# Patient Record
Sex: FEMALE | Race: BLACK OR AFRICAN AMERICAN | Employment: FULL TIME | URBAN - METROPOLITAN AREA
[De-identification: names, ages, dates, MRNs, and addresses within clinical notes are randomized per-mention and may not be internally consistent; named-entity substitution may affect disease eponyms.]

---

## 2020-07-01 LAB
ANTIBODY SCREEN, EXTERNAL: NEGATIVE
CHLAMYDIA, EXTERNAL: NEGATIVE
HBSAG, EXTERNAL: NEGATIVE
HCT, EXTERNAL: 38.6
HGB, EXTERNAL: 13.2
HIV, EXTERNAL: NEGATIVE
N. GONORRHEA, EXTERNAL: NEGATIVE
RPR, EXTERNAL: NORMAL
RUBELLA, EXTERNAL: NORMAL
TYPE, ABO & RH, EXTERNAL: NORMAL

## 2020-09-25 ENCOUNTER — HOSPITAL ENCOUNTER (OUTPATIENT)
Dept: PERINATAL CARE | Age: 25
Discharge: HOME | End: 2020-09-25
Attending: OBSTETRICS & GYNECOLOGY
Payer: COMMERCIAL

## 2020-09-25 PROCEDURE — 76811 OB US DETAILED SNGL FETUS: CPT | Performed by: OBSTETRICS & GYNECOLOGY

## 2020-10-27 LAB — GTT, 1 HR, GLUCOLA, EXTERNAL: 126

## 2021-01-11 ENCOUNTER — HOSPITAL ENCOUNTER (OUTPATIENT)
Dept: PERINATAL CARE | Age: 26
Discharge: HOME | End: 2021-01-11
Attending: OBSTETRICS & GYNECOLOGY
Payer: COMMERCIAL

## 2021-01-11 PROCEDURE — 76816 OB US FOLLOW-UP PER FETUS: CPT | Performed by: OBSTETRICS & GYNECOLOGY

## 2021-01-13 LAB — GRBS, EXTERNAL: POSITIVE

## 2021-01-19 ENCOUNTER — HOSPITAL ENCOUNTER (OUTPATIENT)
Dept: PERINATAL CARE | Age: 26
Discharge: HOME | End: 2021-01-19
Attending: OBSTETRICS & GYNECOLOGY

## 2021-01-27 ENCOUNTER — HOSPITAL ENCOUNTER (OUTPATIENT)
Dept: PERINATAL CARE | Age: 26
Discharge: HOME | End: 2021-01-27
Attending: OBSTETRICS & GYNECOLOGY
Payer: COMMERCIAL

## 2021-01-27 ENCOUNTER — HOSPITAL ENCOUNTER (OUTPATIENT)
Dept: LAB | Age: 26
Discharge: HOME | End: 2021-01-27
Payer: COMMERCIAL

## 2021-01-27 ENCOUNTER — TRANSCRIBE ORDER (OUTPATIENT)
Dept: REGISTRATION | Age: 26
End: 2021-01-27

## 2021-01-27 DIAGNOSIS — Z01.812 PRE-PROCEDURAL LABORATORY EXAMINATIONS: Primary | ICD-10-CM

## 2021-01-27 DIAGNOSIS — Z01.812 PRE-PROCEDURAL LABORATORY EXAMINATIONS: ICD-10-CM

## 2021-01-27 PROCEDURE — U0003 INFECTIOUS AGENT DETECTION BY NUCLEIC ACID (DNA OR RNA); SEVERE ACUTE RESPIRATORY SYNDROME CORONAVIRUS 2 (SARS-COV-2) (CORONAVIRUS DISEASE [COVID-19]), AMPLIFIED PROBE TECHNIQUE, MAKING USE OF HIGH THROUGHPUT TECHNOLOGIES AS DESCRIBED BY CMS-2020-01-R: HCPCS

## 2021-01-27 PROCEDURE — 76820 UMBILICAL ARTERY ECHO: CPT | Performed by: OBSTETRICS & GYNECOLOGY

## 2021-01-27 PROCEDURE — 76819 FETAL BIOPHYS PROFIL W/O NST: CPT | Performed by: OBSTETRICS & GYNECOLOGY

## 2021-01-28 LAB — SARS-COV-2, COV2NT: NOT DETECTED

## 2021-02-01 ENCOUNTER — HOSPITAL ENCOUNTER (INPATIENT)
Age: 26
LOS: 5 days | Discharge: HOME | DRG: 540 | End: 2021-02-06
Attending: OBSTETRICS & GYNECOLOGY | Admitting: OBSTETRICS & GYNECOLOGY
Payer: COMMERCIAL

## 2021-02-01 DIAGNOSIS — Z98.890 STATUS POST SURGERY: Primary | ICD-10-CM

## 2021-02-01 PROBLEM — Z34.90 PREGNANCY: Status: ACTIVE | Noted: 2021-02-01

## 2021-02-01 LAB
ERYTHROCYTE [DISTWIDTH] IN BLOOD BY AUTOMATED COUNT: 15.1 % (ref 11.5–14.5)
HCT VFR BLD AUTO: 36.2 % (ref 35–47)
HGB BLD-MCNC: 11.9 G/DL (ref 11.5–16)
MCH RBC QN AUTO: 27.4 PG (ref 26–34)
MCHC RBC AUTO-ENTMCNC: 32.9 G/DL (ref 30–36.5)
MCV RBC AUTO: 83.4 FL (ref 80–99)
NRBC # BLD: 0 K/UL (ref 0–0.01)
NRBC BLD-RTO: 0 PER 100 WBC
PLATELET # BLD AUTO: 251 K/UL (ref 150–400)
PMV BLD AUTO: 11.5 FL (ref 8.9–12.9)
RBC # BLD AUTO: 4.34 M/UL (ref 3.8–5.2)
WBC # BLD AUTO: 10.3 K/UL (ref 3.6–11)

## 2021-02-01 PROCEDURE — 75410000002 HC LABOR FEE PER 1 HR: Performed by: OBSTETRICS & GYNECOLOGY

## 2021-02-01 PROCEDURE — 74011250636 HC RX REV CODE- 250/636: Performed by: OBSTETRICS & GYNECOLOGY

## 2021-02-01 PROCEDURE — 36415 COLL VENOUS BLD VENIPUNCTURE: CPT

## 2021-02-01 PROCEDURE — 75410000003 HC RECOV DEL/VAG/CSECN EA 0.5 HR: Performed by: OBSTETRICS & GYNECOLOGY

## 2021-02-01 PROCEDURE — 76060000078 HC EPIDURAL ANESTHESIA: Performed by: ANESTHESIOLOGY

## 2021-02-01 PROCEDURE — 59200 INSERT CERVICAL DILATOR: CPT | Performed by: OBSTETRICS & GYNECOLOGY

## 2021-02-01 PROCEDURE — 65270000029 HC RM PRIVATE

## 2021-02-01 PROCEDURE — 85027 COMPLETE CBC AUTOMATED: CPT

## 2021-02-01 PROCEDURE — 77010026065 HC OXYGEN MINIMUM MEDICAL AIR: Performed by: OBSTETRICS & GYNECOLOGY

## 2021-02-01 RX ORDER — NALBUPHINE HYDROCHLORIDE 10 MG/ML
10 INJECTION, SOLUTION INTRAMUSCULAR; INTRAVENOUS; SUBCUTANEOUS
Status: DISCONTINUED | OUTPATIENT
Start: 2021-02-01 | End: 2021-02-06 | Stop reason: HOSPADM

## 2021-02-01 RX ORDER — CALCIUM CARBONATE 200(500)MG
400 TABLET,CHEWABLE ORAL
Status: DISCONTINUED | OUTPATIENT
Start: 2021-02-01 | End: 2021-02-03 | Stop reason: HOSPADM

## 2021-02-01 RX ORDER — SODIUM CHLORIDE, SODIUM LACTATE, POTASSIUM CHLORIDE, CALCIUM CHLORIDE 600; 310; 30; 20 MG/100ML; MG/100ML; MG/100ML; MG/100ML
125 INJECTION, SOLUTION INTRAVENOUS CONTINUOUS
Status: DISCONTINUED | OUTPATIENT
Start: 2021-02-02 | End: 2021-02-04

## 2021-02-01 RX ORDER — OXYTOCIN/RINGER'S LACTATE 30/500 ML
0-20 PLASTIC BAG, INJECTION (ML) INTRAVENOUS
Status: DISCONTINUED | OUTPATIENT
Start: 2021-02-02 | End: 2021-02-06 | Stop reason: HOSPADM

## 2021-02-01 RX ORDER — SODIUM CHLORIDE 0.9 % (FLUSH) 0.9 %
5-40 SYRINGE (ML) INJECTION EVERY 8 HOURS
Status: DISCONTINUED | OUTPATIENT
Start: 2021-02-01 | End: 2021-02-04

## 2021-02-01 RX ORDER — ONDANSETRON 2 MG/ML
4 INJECTION INTRAMUSCULAR; INTRAVENOUS
Status: DISCONTINUED | OUTPATIENT
Start: 2021-02-01 | End: 2021-02-03 | Stop reason: HOSPADM

## 2021-02-01 RX ORDER — ZOLPIDEM TARTRATE 5 MG/1
5 TABLET ORAL
Status: DISCONTINUED | OUTPATIENT
Start: 2021-02-01 | End: 2021-02-06 | Stop reason: HOSPADM

## 2021-02-01 RX ORDER — OXYTOCIN/RINGER'S LACTATE 30/500 ML
10 PLASTIC BAG, INJECTION (ML) INTRAVENOUS AS NEEDED
Status: DISCONTINUED | OUTPATIENT
Start: 2021-02-01 | End: 2021-02-06 | Stop reason: HOSPADM

## 2021-02-01 RX ORDER — MINERAL OIL
120 OIL (ML) ORAL ONCE
Status: ACTIVE | OUTPATIENT
Start: 2021-02-01 | End: 2021-02-02

## 2021-02-01 RX ORDER — OXYTOCIN/RINGER'S LACTATE 30/500 ML
87.3 PLASTIC BAG, INJECTION (ML) INTRAVENOUS AS NEEDED
Status: DISCONTINUED | OUTPATIENT
Start: 2021-02-01 | End: 2021-02-06 | Stop reason: HOSPADM

## 2021-02-01 RX ORDER — NALOXONE HYDROCHLORIDE 0.4 MG/ML
0.4 INJECTION, SOLUTION INTRAMUSCULAR; INTRAVENOUS; SUBCUTANEOUS AS NEEDED
Status: DISCONTINUED | OUTPATIENT
Start: 2021-02-01 | End: 2021-02-03 | Stop reason: HOSPADM

## 2021-02-01 RX ORDER — SODIUM CHLORIDE, SODIUM LACTATE, POTASSIUM CHLORIDE, CALCIUM CHLORIDE 600; 310; 30; 20 MG/100ML; MG/100ML; MG/100ML; MG/100ML
125 INJECTION, SOLUTION INTRAVENOUS CONTINUOUS
Status: DISCONTINUED | OUTPATIENT
Start: 2021-02-01 | End: 2021-02-01

## 2021-02-01 RX ORDER — ACETAMINOPHEN 325 MG/1
650 TABLET ORAL
Status: DISCONTINUED | OUTPATIENT
Start: 2021-02-01 | End: 2021-02-03 | Stop reason: HOSPADM

## 2021-02-01 RX ORDER — SODIUM CHLORIDE 0.9 % (FLUSH) 0.9 %
5-40 SYRINGE (ML) INJECTION AS NEEDED
Status: DISCONTINUED | OUTPATIENT
Start: 2021-02-01 | End: 2021-02-04

## 2021-02-01 RX ADMIN — NALBUPHINE HYDROCHLORIDE 10 MG: 10 INJECTION, SOLUTION INTRAMUSCULAR; INTRAVENOUS; SUBCUTANEOUS at 21:38

## 2021-02-01 NOTE — PROGRESS NOTES
Plan for induction for IUGR    16:25- MD Juan Miguel at bedside to place cook catheter. MD successfully places catheter with use of speculum. 80cc instilled in both uterine and vaginal balloons. Pt tolerated well.     19:15- Bedside and Verbal shift change report given to RADHA Lerma RN (oncoming nurse) by John Paul Daley. Chet Boyd (offgoing nurse). Report included the following information SBAR, Procedure Summary, Intake/Output, MAR, Accordion and Recent Results. RN has nubain per pt request for pain medicine. RNs explain need for pt to remain on EFM for 2 hours if given IV pain medicine. Pt declines as she feels as though the bands for the monitor are too uncomfortable and would like to be able to have EFM removed. Nubain held. RNs remains at bedside adjusting EFM to obtain consistent FHR. Difficulty monitoring FHR d/t pt habitus. May need MD to U/S for FHR.

## 2021-02-01 NOTE — H&P
History & Physical    Name: Nickie Ruggiero MRN: [id number]  SSN: [id number]    YOB: 1995  Age: 23 y.o. Sex: female      Subjective:     Estimated Date of Delivery: 21  OB History        1    Para        Term                AB        Living           SAB        TAB        Ectopic        Molar        Multiple        Live Births                    Ms. Dawson Uribe is admitted with pregnancy at 39w1d for induction of labor due to poor fetal growth. Prenatal course was complicated by fetal growth restriction and mprbid obesity. Please see prenatal records for details. She is not anemic. Past Medical History:   Diagnosis Date    Abnormal Papanicolaou smear of cervix     Asthma     Psychiatric problem     depression     No past surgical history on file. Social History     Occupational History    Not on file   Tobacco Use    Smoking status: Not on file   Substance and Sexual Activity    Alcohol use: Not on file    Drug use: Not on file    Sexual activity: Not on file     No family history on file. No Known Allergies  Prior to Admission medications    Medication Sig Start Date End Date Taking? Authorizing Provider   prenatal 71/SGJB fum/folic/dha (PRENATAL-1 PO) Take  by mouth. Yes Provider, Historical        Review of Systems: A comprehensive review of systems was negative except for that written in the History of Present Illness. Objective:     Vitals:  Vitals:    21 1728 21 1733 21 1738 21 1743   BP:       Pulse:       Resp:       Temp:       SpO2: 100% 100% 100% 100%   Weight:       Height:            Physical Exam:  Patient without distress.   Abdomen: soft, nontender  Fundus: soft and non tender  Perineum: blood absent, amniotic fluid absent  Cervical Exam: Closed/Thick/High  Lower Extremities:  - Edema 1+  Membranes:  Intact  Fetal Heart Rate: Reactive     Estimated Fetal Weight: 7-8 percentile    Prenatal Labs:   Lab Results   Component Value Date/Time    Rubella, External Immune 07/01/2020    GrBStrep, External Positive 01/13/2021    HBsAg, External Negative 07/01/2020    HIV, External Negative 07/01/2020    RPR, External Non-Reactive 07/01/2020    Gonorrhea, External Negative 07/01/2020    Chlamydia, External Negative 07/01/2020        Impression/Plan:     Plan: Admit for induction of labor. Group B Strep positive, will treat prophylactically with penicillin. Cook catheter placed under direct vision with speculum 80/80cc. SGA.     Signed By:  Nino Ennis MD     February 1, 2021

## 2021-02-02 ENCOUNTER — ANESTHESIA EVENT (OUTPATIENT)
Dept: LABOR AND DELIVERY | Age: 26
DRG: 540 | End: 2021-02-02
Payer: COMMERCIAL

## 2021-02-02 ENCOUNTER — ANESTHESIA (OUTPATIENT)
Dept: LABOR AND DELIVERY | Age: 26
DRG: 540 | End: 2021-02-02
Payer: COMMERCIAL

## 2021-02-02 PROBLEM — Z3A.39 39 WEEKS GESTATION OF PREGNANCY: Status: ACTIVE | Noted: 2021-02-02

## 2021-02-02 PROBLEM — O36.5930 INTRAUTERINE GROWTH RESTRICTION, ANTEPARTUM, THIRD TRIMESTER, NOT APPLICABLE OR UNSPECIFIED FETUS: Status: ACTIVE | Noted: 2021-02-02

## 2021-02-02 PROCEDURE — 74011250636 HC RX REV CODE- 250/636: Performed by: OBSTETRICS & GYNECOLOGY

## 2021-02-02 PROCEDURE — 2709999900 HC NON-CHARGEABLE SUPPLY

## 2021-02-02 PROCEDURE — 3E033VJ INTRODUCTION OF OTHER HORMONE INTO PERIPHERAL VEIN, PERCUTANEOUS APPROACH: ICD-10-PCS | Performed by: OBSTETRICS & GYNECOLOGY

## 2021-02-02 PROCEDURE — 77030014125 HC TY EPDRL BBMI -B: Performed by: ANESTHESIOLOGY

## 2021-02-02 PROCEDURE — 65270000029 HC RM PRIVATE

## 2021-02-02 PROCEDURE — 74011000250 HC RX REV CODE- 250: Performed by: ANESTHESIOLOGY

## 2021-02-02 PROCEDURE — 74011000258 HC RX REV CODE- 258: Performed by: OBSTETRICS & GYNECOLOGY

## 2021-02-02 PROCEDURE — 74011000250 HC RX REV CODE- 250: Performed by: NURSE ANESTHETIST, CERTIFIED REGISTERED

## 2021-02-02 PROCEDURE — 74011000250 HC RX REV CODE- 250

## 2021-02-02 PROCEDURE — 75410000002 HC LABOR FEE PER 1 HR: Performed by: OBSTETRICS & GYNECOLOGY

## 2021-02-02 PROCEDURE — 00HU33Z INSERTION OF INFUSION DEVICE INTO SPINAL CANAL, PERCUTANEOUS APPROACH: ICD-10-PCS | Performed by: ANESTHESIOLOGY

## 2021-02-02 PROCEDURE — 77030005513 HC CATH URETH FOL11 MDII -B

## 2021-02-02 RX ORDER — EPHEDRINE SULFATE/0.9% NACL/PF 50 MG/5 ML
10 SYRINGE (ML) INTRAVENOUS ONCE
Status: COMPLETED | OUTPATIENT
Start: 2021-02-02 | End: 2021-02-02

## 2021-02-02 RX ORDER — FENTANYL/BUPIVACAINE/NS/PF 2-1250MCG
12 PREFILLED PUMP RESERVOIR EPIDURAL CONTINUOUS
Status: DISCONTINUED | OUTPATIENT
Start: 2021-02-02 | End: 2021-02-03 | Stop reason: HOSPADM

## 2021-02-02 RX ORDER — EPHEDRINE SULFATE/0.9% NACL/PF 50 MG/5 ML
SYRINGE (ML) INTRAVENOUS
Status: DISCONTINUED
Start: 2021-02-02 | End: 2021-02-02 | Stop reason: WASHOUT

## 2021-02-02 RX ORDER — EPHEDRINE SULFATE/0.9% NACL/PF 50 MG/5 ML
20 SYRINGE (ML) INTRAVENOUS ONCE
Status: COMPLETED | OUTPATIENT
Start: 2021-02-02 | End: 2021-02-02

## 2021-02-02 RX ORDER — EPHEDRINE SULFATE/0.9% NACL/PF 50 MG/5 ML
10 SYRINGE (ML) INTRAVENOUS
Status: COMPLETED | OUTPATIENT
Start: 2021-02-02 | End: 2021-02-02

## 2021-02-02 RX ORDER — LIDOCAINE HYDROCHLORIDE AND EPINEPHRINE 20; 5 MG/ML; UG/ML
INJECTION, SOLUTION EPIDURAL; INFILTRATION; INTRACAUDAL; PERINEURAL AS NEEDED
Status: DISCONTINUED | OUTPATIENT
Start: 2021-02-02 | End: 2021-02-03 | Stop reason: HOSPADM

## 2021-02-02 RX ADMIN — SODIUM CHLORIDE, POTASSIUM CHLORIDE, SODIUM LACTATE AND CALCIUM CHLORIDE 999 ML/HR: 600; 310; 30; 20 INJECTION, SOLUTION INTRAVENOUS at 07:52

## 2021-02-02 RX ADMIN — Medication 12 ML/HR: at 08:58

## 2021-02-02 RX ADMIN — SODIUM CHLORIDE, POTASSIUM CHLORIDE, SODIUM LACTATE AND CALCIUM CHLORIDE 125 ML/HR: 600; 310; 30; 20 INJECTION, SOLUTION INTRAVENOUS at 02:25

## 2021-02-02 RX ADMIN — SODIUM CHLORIDE 2.5 MILLION UNITS: 9 INJECTION, SOLUTION INTRAVENOUS at 11:20

## 2021-02-02 RX ADMIN — NALBUPHINE HYDROCHLORIDE 10 MG: 10 INJECTION, SOLUTION INTRAMUSCULAR; INTRAVENOUS; SUBCUTANEOUS at 02:37

## 2021-02-02 RX ADMIN — ONDANSETRON 4 MG: 2 INJECTION INTRAMUSCULAR; INTRAVENOUS at 09:04

## 2021-02-02 RX ADMIN — SODIUM CHLORIDE 2.5 MILLION UNITS: 9 INJECTION, SOLUTION INTRAVENOUS at 06:38

## 2021-02-02 RX ADMIN — OXYTOCIN 2 MILLI-UNITS/MIN: 10 INJECTION, SOLUTION INTRAMUSCULAR; INTRAVENOUS at 04:57

## 2021-02-02 RX ADMIN — SODIUM CHLORIDE, POTASSIUM CHLORIDE, SODIUM LACTATE AND CALCIUM CHLORIDE 125 ML/HR: 600; 310; 30; 20 INJECTION, SOLUTION INTRAVENOUS at 08:58

## 2021-02-02 RX ADMIN — SODIUM CHLORIDE 2.5 MILLION UNITS: 9 INJECTION, SOLUTION INTRAVENOUS at 20:22

## 2021-02-02 RX ADMIN — SODIUM CHLORIDE 5 MILLION UNITS: 900 INJECTION INTRAVENOUS at 02:25

## 2021-02-02 RX ADMIN — Medication 10 ML/HR: at 17:31

## 2021-02-02 RX ADMIN — SODIUM CHLORIDE 12.5 MG: 9 INJECTION, SOLUTION INTRAVENOUS at 03:12

## 2021-02-02 RX ADMIN — LIDOCAINE HYDROCHLORIDE,EPINEPHRINE BITARTRATE 8 ML: 20; .005 INJECTION, SOLUTION EPIDURAL; INFILTRATION; INTRACAUDAL; PERINEURAL at 19:23

## 2021-02-02 RX ADMIN — SODIUM CHLORIDE, POTASSIUM CHLORIDE, SODIUM LACTATE AND CALCIUM CHLORIDE 125 ML/HR: 600; 310; 30; 20 INJECTION, SOLUTION INTRAVENOUS at 17:55

## 2021-02-02 RX ADMIN — LIDOCAINE HYDROCHLORIDE,EPINEPHRINE BITARTRATE 10 ML: 20; .005 INJECTION, SOLUTION EPIDURAL; INFILTRATION; INTRACAUDAL; PERINEURAL at 08:47

## 2021-02-02 RX ADMIN — Medication 10 MG: at 19:30

## 2021-02-02 RX ADMIN — Medication 10 MG: at 19:28

## 2021-02-02 RX ADMIN — Medication 20 MG: at 20:25

## 2021-02-02 RX ADMIN — SODIUM CHLORIDE 2.5 MILLION UNITS: 9 INJECTION, SOLUTION INTRAVENOUS at 15:54

## 2021-02-02 NOTE — ANESTHESIA PROCEDURE NOTES
Epidural Block    Patient location during procedure: OB  Start time: 2/2/2021 8:34 AM  End time: 2/2/2021 8:50 AM  Reason for block: labor epidural  Staffing  Performed: attending   Anesthesiologist: Reji Hearn MD  Preanesthetic Checklist  Completed: patient identified, risks and benefits discussed and timeout performed  Block Placement  Patient position: sitting  Prep: Betadine  Sterility prep: cap, drape, gloves, gown, hand and mask  Sedation level: no sedation  Patient monitoring: continuous pulse oximetry and heart rate  Approach: midline  Location: lumbar  Lumbar location: L3-L4  Epidural  Loss of resistance technique: air  Guidance: landmark technique  Needle  Needle type: Tuohy   Needle gauge: 17 G  Needle length: 9 cm  Catheter type: multi-orifice  Catheter size: 20 G  Catheter securement method: surgical tape  Test dose: negative  Assessment  Procedure assessment: patient tolerated procedure well with no complications  Additional Notes  Catheter left in 5 cm,  15   cm at skin

## 2021-02-02 NOTE — PROGRESS NOTES
Labor Progress Note  Patient seen, fetal heart rate and contraction pattern evaluated, patient examined. More uncomfortable with UCs. Patient Vitals for the past 1 hrs:   BP Temp Pulse Resp SpO2   02/02/21 0722 (!) 140/72 98.1 °F (36.7 °C) 73 16 100 %       Physical Exam: Pollyann Ashley catheter removed with traction  Cervical Exam:  5 cm dilated    60% effaced    -2 station    Membranes:  Intact  Uterine Activity: difficult to trace, appears to be every 2-7 min per nurse  Fetal Heart Rate: Baseline: 130 per minute  Variability: moderate  Accelerations: yes  Decelerations: none    Assessment/Plan:  Reassuring fetal status, Continue plan for vaginal delivery. Occasional Difficulty monitoring due to body habitus. Will get epidural and then effect AROM and place internal monitors.

## 2021-02-02 NOTE — PROGRESS NOTES
Labor Progress Note  Patient seen, fetal heart rate and contraction pattern evaluated, patient examined. Reports feeling some pressure  No data found. Physical Exam:  Cervical Exam:  5 cm dilated    60% effaced    -2  station    Membranes:  s/p AROM, clear  Uterine Activity: Frequency: Every 1.5-4 minutes  Fetal Heart Rate: Baseline: 120 per minute  Variability: moderate  Accelerations: yes  Decelerations: none    Assessment/Plan:  Reassuring fetal status. No cervical change in >7 hours with adequate labor by IUPC. Discussion with patient and family regarding how to proceed. Will d/w on call physician.

## 2021-02-02 NOTE — PROGRESS NOTES
4621: Bedside shift change report given to Abhilash Aparicio RN (oncoming nurse) by Nilesh Echols RN (offgoing nurse). Report included the following information SBAR, Intake/Output, MAR and Recent Results. 0715: This Rn at bedside for shift assessment. Patient resting in position of comfort in locked and lowered bed. FOB at bedside on couch. Patient reports current 7/10 pain r/t intermittent abdominal contractions. Patient reports desire to begin bolusing for epidural placement; this RN to begin bolus rate of LR, see MAR.     0745: This RN and Zari GRAVES at bedside. MD assessing patient and FHR tracing. 5471Willey Dy catheter removed per SAHARA from [facility] MD. Patient tolerated well. 6729: SVE performed by Zari GRAVES: 5/60/-2. Patient tolerated well. Bed pad and chux changed. Patient continues to bolus for epidural placement. 3759: Patient sitting upright on edge of bed in appropriate position for epidural placement. FOB, Jamison Ochoa, stepping away from the bedside. TIme out performed for epidural placement by this RN with patient and Edel Estevez MD.    9157: Successful epidural test dose administered by Edel Estevez MD.    6581: This RN informing Zari GRAVES of epidural and asencio catheter placement via telephone per MD order. MD verbalizing understanding. MD to come to bedside shortly to AROM patient and place internal monitoring devices. 4315: AROM performed by Zari GRAVES: clear, moderate. Legs supported by two RNs and FOB. Patient tolerated well. 9405: IUPC placed by Zari GRAVES, patient tolerated well. 0932: FSE placed by Zari GRAVES, patient tolerated well. Bed pad and chux changed by this RN, two additional RNs, and FOB. HOB elevated. 0935: Patient provided with popsicle. 1145: FOB and patient's mother leaving the bedside at this time. 1307: SVE performed by Zari GRAVES: unchanged- 5cm.  Patient repositioned onto lateral left with HOB elevation and peanut ball between legs by this RN and Zari MD. Patient tolerated well. MD educating patient and family on possibility of  delivery if patient does not progress. Patient, FOB, and patient's mother verbalizing understanding. 1500: Patient repositioned onto lateral right with HOB elevation and peanut ball between legs. Bed remains locked and lowered, call bell within reach. Patient denies further needs. FOB remains at bedside. 1627: This RN at bedside to perform position changes. This RN and FOB assisting patient into walcher's position. 1636: This RN remains at bedside. Patient repositioned onto right side lying hip release by this RN. 1642: This RN remains at bedside. Patient repositioned onto left side lying hip release by this RN. Bed pad and chux changed. 165: This RN continuously at bedside. SVE performed by Zari GRAVES: unchanged, . Patient tolerated well. MD discussing possibility of  section with patient. 165: Zari GRAVES remains at bedside. Patient repositioned onto lateral right by this RN and Romero Al. 1704: This RN and Zari GRAVES leaving the bedside. Patient to discuss possible  with FOB and patient's mother. Khushi Oshea MD at bedside redosing epidural.    : Verbal shift change report given to Aurelia Kerns RN and Harman French RN (oncoming nurse) by Isamar Serrano RN (offgoing nurse). Report included the following information SBAR, Intake/Output, MAR and Recent Results.

## 2021-02-02 NOTE — PROGRESS NOTES
Labor Progress Note  Patient seen, fetal heart rate and contraction pattern evaluated, patient examined. No complaints. Patient Vitals for the past 1 hrs:   BP Pulse SpO2   02/02/21 1250   99 %   02/02/21 1245   100 %   02/02/21 1238 125/60 76 100 %   02/02/21 1233   100 %   02/02/21 1228   100 %   02/02/21 1223 130/67 71 100 %   02/02/21 1218   100 %       Physical Exam:  Cervical Exam:  5 cm dilated    60% effaced    -2 station    Membranes:  s/p AROM  Uterine Activity: Frequency: Every 2-4 minutes  Fetal Heart Rate: Baseline: 120 per minute  Variability: moderate  Accelerations: yes  Decelerations: none    Assessment/Plan:  Reassuring fetal status, concern due to no change in almost 5 hours with adequate labor for several hours now.  will reposition patient and recheck in  2-4 hours or prn.

## 2021-02-02 NOTE — PROGRESS NOTES
Report from Candace . Pt requesting pain meds for mild cramping on day shift. Pt also requesting to come off of monitor because straps are \"digging into her back and sides\". Bedside report with Offgoing RN and pt educated that if she receives IV pan meds she will need to be monitored continuously. Pt declines pain meds at this time as she would rather come off the monitor. Oncoming RN was at bedside for more than 30mins attempting to get continuous tracing. Fetus audibly active and difficult to monitor. Dr Debi Tobin called and voiced concern of being able to continuously monitor baby. MD advised of induction reason and percentile for IUGR. MD advises to monitor baby every 4 hours unless pt gets pain meds or reports ctx increase in frequency. MD also advised that it may be hard to start Pit at 0500. MD advises that hopefully brooke would have come out by then or ready to come out and she can AROM and placed internal monitors. Will continue to assess. 1945-RN at bedside discussing in length POC to monitor the pt every 4 hours since she is IUGR. Also educated pt on increase pressure in the vagina could mean [place] [facility] has dislodged; pt understands RN may tug on [place] periodically to check placement. Pt understands that if she has increase in pain or request pain meds, she will need to be monitored sooner. Pt advised she may get up to use the bathroom as needed and that she may see some spotting or bleeding from the vagina because of the placement of the [facility] [place] [facility]. Pt verbalizes understanding. 2030-Pt resting quietly in bed with eyes closed. 2120-Pt reports pain has come back and requesting IV pain meds. RN at bedside to evaluate pt. External monitors placed and RN tugs on [facility] [place] [facility] to check placement since pt is hurting more. Vaginal balloon side visible at opening of vagina. 2129-Dr Debi Tobin updated on pt status. MD advises to check again in the next few hours on the balloon and call if it comes out.  MD okay to give Nubain for pain. 2205-Dr Lurdes Poe reviews strip at nurses station. MD advises to continue with current POC.     2300-Pt repositioned to right tilt and US and toco adjusted. 0225-Pt reports some increase in pain again. Pt requesting IV pain meds again. Reeducated pt on the need to monitor her for 2 hours after IV pain meds. Pt assisted to readjust in bed for comfort. Abx started. 3166- phenergan IV given for vomiting.     0400-pt resting quietly with eyes closed. 0600- Pt assisted to br and back to bed. US and toco adjusted. 0700-Report to Patricia Aguilar RN. Care relinquished.

## 2021-02-02 NOTE — PROGRESS NOTES
2/2/2021  12:12 PM    CM met with STEPHAN to complete initial assessment and begin discharge planning. MOB verified and confirmed demographics. STEPHAN lives with boyfriend/FOB- Richar Abel ([phone]) at: [address], [phone] [id number]. Pt's address on her chart is her mailing address and would like to keep it on file. STEPHAN is employed and plans to take about 6wks off from work. FOWALLACE is also employed and will be taking adequate time off. STEPHAN reports she has good family support. STEPHAN plans to breast feed baby and has pump to use at home. STEPHAN plans to follow with [facility] for pediatric care. STEPHAN has car seat, bassinet/crib, clothing, bottles and all necessary supplies for baby. STEPHAN has [facility], and will be adding baby to this policy. CM discussed process to add baby to insurance, MOB verbalized understanding. STEPHAN is also enrolled in [facility] benefits and understands how to add baby to program.    Care Management Interventions  PCP Verified by CM: Yes(Zari)  Mode of Transport at Discharge:  Other (see comment)  Transition of Care Consult (CM Consult): Discharge Planning  Current Support Network: Own Home, Family Lives Nearby  Confirm Follow Up Transport: Family  Discharge Location  Discharge Placement: Home with family assistance  Arianna Guerra

## 2021-02-02 NOTE — ANESTHESIA PREPROCEDURE EVALUATION
Relevant Problems   No relevant active problems       Anesthetic History   No history of anesthetic complications            Review of Systems / Medical History  Patient summary reviewed, nursing notes reviewed and pertinent labs reviewed    Pulmonary            Asthma        Neuro/Psych   Within defined limits           Cardiovascular  Within defined limits                Exercise tolerance: >4 METS     GI/Hepatic/Renal  Within defined limits              Endo/Other        Morbid obesity     Other Findings              Physical Exam    Airway  Mallampati: II    Neck ROM: normal range of motion   Mouth opening: Normal     Cardiovascular  Regular rate and rhythm,  S1 and S2 normal,  no murmur, click, rub, or gallop  Rhythm: regular  Rate: normal         Dental  No notable dental hx       Pulmonary  Breath sounds clear to auscultation               Abdominal  GI exam deferred       Other Findings            Anesthetic Plan    ASA: 3  Anesthesia type: epidural      Post-op pain plan if not by surgeon: indwelling epidural catheter      Anesthetic plan and risks discussed with: Patient      Late entry - preop done, questions answered, and consent obtained prior to procedure; note entered after procedure at 8:49 AM

## 2021-02-03 LAB
ABO + RH BLD: NORMAL
BLOOD GROUP ANTIBODIES SERPL: NORMAL
SPECIMEN EXP DATE BLD: NORMAL

## 2021-02-03 PROCEDURE — 2709999900 HC NON-CHARGEABLE SUPPLY

## 2021-02-03 PROCEDURE — 74011250636 HC RX REV CODE- 250/636

## 2021-02-03 PROCEDURE — 77030039147 HC PWDR HEMSTS SURGICEL JNJ -D

## 2021-02-03 PROCEDURE — 74011250636 HC RX REV CODE- 250/636: Performed by: OBSTETRICS & GYNECOLOGY

## 2021-02-03 PROCEDURE — 77030008459 HC STPLR SKN COOP -B

## 2021-02-03 PROCEDURE — 77030018842 HC SOL IRR SOD CL 9% BAXT -A

## 2021-02-03 PROCEDURE — 74011000258 HC RX REV CODE- 258: Performed by: OBSTETRICS & GYNECOLOGY

## 2021-02-03 PROCEDURE — 10H07YZ INSERTION OF OTHER DEVICE INTO PRODUCTS OF CONCEPTION, VIA NATURAL OR ARTIFICIAL OPENING: ICD-10-PCS | Performed by: OBSTETRICS & GYNECOLOGY

## 2021-02-03 PROCEDURE — 36415 COLL VENOUS BLD VENIPUNCTURE: CPT

## 2021-02-03 PROCEDURE — 4A1H7CZ MONITORING OF PRODUCTS OF CONCEPTION, CARDIAC RATE, VIA NATURAL OR ARTIFICIAL OPENING: ICD-10-PCS | Performed by: OBSTETRICS & GYNECOLOGY

## 2021-02-03 PROCEDURE — 10H073Z INSERTION OF MONITORING ELECTRODE INTO PRODUCTS OF CONCEPTION, VIA NATURAL OR ARTIFICIAL OPENING: ICD-10-PCS | Performed by: OBSTETRICS & GYNECOLOGY

## 2021-02-03 PROCEDURE — 74011250637 HC RX REV CODE- 250/637: Performed by: OBSTETRICS & GYNECOLOGY

## 2021-02-03 PROCEDURE — 77030040179 HC DEV DRSG WND PICO S&N -C

## 2021-02-03 PROCEDURE — 76010000391 HC C SECN FIRST 1 HR: Performed by: OBSTETRICS & GYNECOLOGY

## 2021-02-03 PROCEDURE — 74011000250 HC RX REV CODE- 250: Performed by: ANESTHESIOLOGY

## 2021-02-03 PROCEDURE — 74011000250 HC RX REV CODE- 250: Performed by: NURSE ANESTHETIST, CERTIFIED REGISTERED

## 2021-02-03 PROCEDURE — 74011250636 HC RX REV CODE- 250/636: Performed by: NURSE ANESTHETIST, CERTIFIED REGISTERED

## 2021-02-03 PROCEDURE — 75410000002 HC LABOR FEE PER 1 HR: Performed by: OBSTETRICS & GYNECOLOGY

## 2021-02-03 PROCEDURE — 76010000392 HC C SECN EA ADDL 0.5 HR: Performed by: OBSTETRICS & GYNECOLOGY

## 2021-02-03 PROCEDURE — 77030040361 HC SLV COMPR DVT MDII -B

## 2021-02-03 PROCEDURE — 75410000003 HC RECOV DEL/VAG/CSECN EA 0.5 HR: Performed by: OBSTETRICS & GYNECOLOGY

## 2021-02-03 PROCEDURE — 76060000078 HC EPIDURAL ANESTHESIA: Performed by: OBSTETRICS & GYNECOLOGY

## 2021-02-03 PROCEDURE — 86901 BLOOD TYPING SEROLOGIC RH(D): CPT

## 2021-02-03 PROCEDURE — 65270000029 HC RM PRIVATE

## 2021-02-03 RX ORDER — OXYCODONE AND ACETAMINOPHEN 5; 325 MG/1; MG/1
2 TABLET ORAL
Status: DISCONTINUED | OUTPATIENT
Start: 2021-02-03 | End: 2021-02-06 | Stop reason: HOSPADM

## 2021-02-03 RX ORDER — NALOXONE HYDROCHLORIDE 0.4 MG/ML
0.4 INJECTION, SOLUTION INTRAMUSCULAR; INTRAVENOUS; SUBCUTANEOUS AS NEEDED
Status: DISCONTINUED | OUTPATIENT
Start: 2021-02-03 | End: 2021-02-06 | Stop reason: HOSPADM

## 2021-02-03 RX ORDER — HYDROMORPHONE HYDROCHLORIDE 1 MG/ML
1 INJECTION, SOLUTION INTRAMUSCULAR; INTRAVENOUS; SUBCUTANEOUS
Status: DISCONTINUED | OUTPATIENT
Start: 2021-02-03 | End: 2021-02-06 | Stop reason: HOSPADM

## 2021-02-03 RX ORDER — SODIUM CHLORIDE, SODIUM LACTATE, POTASSIUM CHLORIDE, CALCIUM CHLORIDE 600; 310; 30; 20 MG/100ML; MG/100ML; MG/100ML; MG/100ML
125 INJECTION, SOLUTION INTRAVENOUS CONTINUOUS
Status: DISCONTINUED | OUTPATIENT
Start: 2021-02-03 | End: 2021-02-04

## 2021-02-03 RX ORDER — PROPRANOLOL HYDROCHLORIDE 1 MG/ML
2 INJECTION INTRAVENOUS
Status: DISCONTINUED | OUTPATIENT
Start: 2021-02-03 | End: 2021-02-03

## 2021-02-03 RX ORDER — SODIUM CHLORIDE 0.9 % (FLUSH) 0.9 %
5-40 SYRINGE (ML) INJECTION EVERY 8 HOURS
Status: DISCONTINUED | OUTPATIENT
Start: 2021-02-03 | End: 2021-02-04

## 2021-02-03 RX ORDER — ENOXAPARIN SODIUM 100 MG/ML
40 INJECTION SUBCUTANEOUS EVERY 24 HOURS
Status: DISCONTINUED | OUTPATIENT
Start: 2021-02-04 | End: 2021-02-03

## 2021-02-03 RX ORDER — DEXTROSE MONOHYDRATE AND SODIUM CHLORIDE 5; .45 G/100ML; G/100ML
250 INJECTION, SOLUTION INTRAVENOUS ONCE
Status: COMPLETED | OUTPATIENT
Start: 2021-02-03 | End: 2021-02-03

## 2021-02-03 RX ORDER — SODIUM CHLORIDE 0.9 % (FLUSH) 0.9 %
5-40 SYRINGE (ML) INJECTION AS NEEDED
Status: DISCONTINUED | OUTPATIENT
Start: 2021-02-03 | End: 2021-02-04

## 2021-02-03 RX ORDER — ONDANSETRON 4 MG/1
4 TABLET, ORALLY DISINTEGRATING ORAL
Status: DISCONTINUED | OUTPATIENT
Start: 2021-02-03 | End: 2021-02-06 | Stop reason: HOSPADM

## 2021-02-03 RX ORDER — KETOROLAC TROMETHAMINE 30 MG/ML
30 INJECTION, SOLUTION INTRAMUSCULAR; INTRAVENOUS
Status: ACTIVE | OUTPATIENT
Start: 2021-02-03 | End: 2021-02-04

## 2021-02-03 RX ORDER — DIPHENHYDRAMINE HCL 25 MG
25 CAPSULE ORAL
Status: DISCONTINUED | OUTPATIENT
Start: 2021-02-03 | End: 2021-02-06 | Stop reason: HOSPADM

## 2021-02-03 RX ORDER — OXYCODONE HYDROCHLORIDE 5 MG/1
5 TABLET ORAL
Status: ACTIVE | OUTPATIENT
Start: 2021-02-03 | End: 2021-02-04

## 2021-02-03 RX ORDER — OXYTOCIN/RINGER'S LACTATE 30/500 ML
10 PLASTIC BAG, INJECTION (ML) INTRAVENOUS AS NEEDED
Status: DISCONTINUED | OUTPATIENT
Start: 2021-02-03 | End: 2021-02-06 | Stop reason: HOSPADM

## 2021-02-03 RX ORDER — IBUPROFEN 800 MG/1
800 TABLET ORAL
Status: DISCONTINUED | OUTPATIENT
Start: 2021-02-03 | End: 2021-02-05

## 2021-02-03 RX ORDER — TRANEXAMIC ACID 100 MG/ML
INJECTION, SOLUTION INTRAVENOUS AS NEEDED
Status: DISCONTINUED | OUTPATIENT
Start: 2021-02-03 | End: 2021-02-03 | Stop reason: HOSPADM

## 2021-02-03 RX ORDER — KETOROLAC TROMETHAMINE 30 MG/ML
30 INJECTION, SOLUTION INTRAMUSCULAR; INTRAVENOUS EVERY 6 HOURS
Status: DISPENSED | OUTPATIENT
Start: 2021-02-03 | End: 2021-02-04

## 2021-02-03 RX ORDER — OXYCODONE AND ACETAMINOPHEN 5; 325 MG/1; MG/1
1 TABLET ORAL
Status: DISCONTINUED | OUTPATIENT
Start: 2021-02-03 | End: 2021-02-06 | Stop reason: HOSPADM

## 2021-02-03 RX ORDER — ZOLPIDEM TARTRATE 5 MG/1
5 TABLET ORAL
Status: DISCONTINUED | OUTPATIENT
Start: 2021-02-03 | End: 2021-02-06 | Stop reason: HOSPADM

## 2021-02-03 RX ORDER — OXYTOCIN 10 [USP'U]/ML
INJECTION, SOLUTION INTRAMUSCULAR; INTRAVENOUS AS NEEDED
Status: DISCONTINUED | OUTPATIENT
Start: 2021-02-03 | End: 2021-02-03 | Stop reason: HOSPADM

## 2021-02-03 RX ORDER — OXYCODONE HYDROCHLORIDE 5 MG/1
10 TABLET ORAL
Status: ACTIVE | OUTPATIENT
Start: 2021-02-03 | End: 2021-02-04

## 2021-02-03 RX ORDER — OXYTOCIN/RINGER'S LACTATE 30/500 ML
87.3 PLASTIC BAG, INJECTION (ML) INTRAVENOUS AS NEEDED
Status: DISCONTINUED | OUTPATIENT
Start: 2021-02-03 | End: 2021-02-06 | Stop reason: HOSPADM

## 2021-02-03 RX ORDER — NALOXONE HYDROCHLORIDE 0.4 MG/ML
0.2 INJECTION, SOLUTION INTRAMUSCULAR; INTRAVENOUS; SUBCUTANEOUS
Status: ACTIVE | OUTPATIENT
Start: 2021-02-03 | End: 2021-02-04

## 2021-02-03 RX ADMIN — SODIUM CHLORIDE, POTASSIUM CHLORIDE, SODIUM LACTATE AND CALCIUM CHLORIDE 125 ML/HR: 600; 310; 30; 20 INJECTION, SOLUTION INTRAVENOUS at 12:30

## 2021-02-03 RX ADMIN — Medication 12 ML/HR: at 02:15

## 2021-02-03 RX ADMIN — OXYCODONE AND ACETAMINOPHEN 1 TABLET: 5; 325 TABLET ORAL at 17:23

## 2021-02-03 RX ADMIN — SODIUM CHLORIDE 2.5 MILLION UNITS: 9 INJECTION, SOLUTION INTRAVENOUS at 12:32

## 2021-02-03 RX ADMIN — LIDOCAINE HYDROCHLORIDE,EPINEPHRINE BITARTRATE 5 ML: 20; .005 INJECTION, SOLUTION EPIDURAL; INFILTRATION; INTRACAUDAL; PERINEURAL at 14:01

## 2021-02-03 RX ADMIN — TRANEXAMIC ACID 1 G: 100 INJECTION, SOLUTION INTRAVENOUS at 14:26

## 2021-02-03 RX ADMIN — LIDOCAINE HYDROCHLORIDE,EPINEPHRINE BITARTRATE 3 ML: 20; .005 INJECTION, SOLUTION EPIDURAL; INFILTRATION; INTRACAUDAL; PERINEURAL at 14:08

## 2021-02-03 RX ADMIN — SODIUM CHLORIDE 2.5 MILLION UNITS: 9 INJECTION, SOLUTION INTRAVENOUS at 07:56

## 2021-02-03 RX ADMIN — OXYCODONE AND ACETAMINOPHEN 1 TABLET: 5; 325 TABLET ORAL at 23:22

## 2021-02-03 RX ADMIN — Medication 12 ML/HR: at 08:33

## 2021-02-03 RX ADMIN — DEXTROSE AND SODIUM CHLORIDE 250 ML: 5; 450 INJECTION, SOLUTION INTRAVENOUS at 09:34

## 2021-02-03 RX ADMIN — LIDOCAINE HYDROCHLORIDE,EPINEPHRINE BITARTRATE 2 ML: 20; .005 INJECTION, SOLUTION EPIDURAL; INFILTRATION; INTRACAUDAL; PERINEURAL at 14:13

## 2021-02-03 RX ADMIN — OXYTOCIN 40 UNITS: 10 INJECTION, SOLUTION INTRAMUSCULAR; INTRAVENOUS at 14:26

## 2021-02-03 RX ADMIN — SODIUM CHLORIDE 2.5 MILLION UNITS: 9 INJECTION, SOLUTION INTRAVENOUS at 03:35

## 2021-02-03 RX ADMIN — CEFAZOLIN SODIUM 3 G: 10 INJECTION, POWDER, FOR SOLUTION INTRAVENOUS at 14:10

## 2021-02-03 RX ADMIN — KETOROLAC TROMETHAMINE 30 MG: 30 INJECTION, SOLUTION INTRAMUSCULAR at 16:38

## 2021-02-03 RX ADMIN — KETOROLAC TROMETHAMINE 30 MG: 30 INJECTION, SOLUTION INTRAMUSCULAR at 22:33

## 2021-02-03 RX ADMIN — ONDANSETRON 4 MG: 4 TABLET, ORALLY DISINTEGRATING ORAL at 23:14

## 2021-02-03 NOTE — ASSESSMENT & PLAN NOTE
Still latent phase labor  No cervical change, except effacement  Continue oxytocin  Good contraction pattern

## 2021-02-03 NOTE — PROGRESS NOTES
1905: Bedside and Verbal shift change report given to JOHN Duran and Tom Cisse 136 (oncoming nurse) by Sameera Casrto RN (offgoing nurse). Report included the following information SBAR, Kardex, Procedure Summary, Intake/Output, MAR, Accordion and Recent Results. 1921: Dr Farooq at bedside to assess pt. Pt feels as if she can not breath. Dr. Mancil Severance called to bedside to assess patient due to increased numbness in hands and chest and difficulty breathing. 1922: Dr. Mancil Severance at bedside to assess pt. VORB by Dr. Mancil Severance to stop epidural at this time. Non re-breather placed on patient. 1928: VORB by Dr. Farooq to stop pit at this time. IV fluid bolus started. VORB by Dr. Mancil Severance to give 10mg ephedrine at this time. 1930: VORB by Dr. Mancil Severance to give 2nd dose of 10mg of ephedrine at this time. 2006: Dr. Mancil Severance at bedside to assess pt.    2025: 20mg ephedrine given per TORB by Dr. Mancil Severance    2035: Dr. Mancil Severance at bedside to assess pt. VORB to restart epidural at this time. 23:31: Dr. Farooq at bedside to assess pt SVE per MD 5/90/-2    0315: Dr. Farooq at bedside to assess pt. SVE per MD 6/100/-2. This RN asked MD about forebag due to lack of amniotic fluid leaking. MD states no forebag felt on examination. VORB to cut pitocin from 16 to 8mL/hr. Discussed POC with patient and pt agreeable to POC.      5546: IUPC flushed at this time. 9636: Bedside and Verbal shift change report given to JOHN Maharaj RN (oncoming nurse) by JOHN Duran RN and JAMES Benavides RN (offgoing nurse). Report included the following information SBAR, Kardex, Procedure Summary, Intake/Output, MAR, Accordion and Recent Results.

## 2021-02-03 NOTE — PROGRESS NOTES
Labor Progress Note  Patient seen, fetal heart rate and contraction pattern evaluated, patient examined. Pt feeling some abd and vaginal pressure  Patient Vitals for the past 1 hrs:   SpO2   02/03/21 0750 100 %   02/03/21 0745 100 %   02/03/21 0739 100 %       Physical Exam:  Cervical Exam:  6 cm dilated    90% effaced    -1 station    Membranes:  s/p AROM  Uterine Activity: Frequency: Every 2-4 minutes  (MVUs ~150 recently)  Fetal Heart Rate: Baseline: 130 per minute  Variability: moderate  Accelerations: yes  Decelerations: late x 1 (after check, while on her back)    Assessment/Plan:  Reassuring fetal status. No cervical change in over 4 hours and minimal change in the last 24 hours. Some improvement in station and effacement since last night. Will continue as is for now. Recheck in ~3-4 hours or prn.

## 2021-02-03 NOTE — BRIEF OP NOTE
Brief Postoperative Note    Patient: Urszula Rubio  YOB: 1995  MRN: [id number]    Date of Procedure: 2/3/2021     Pre-Op Diagnosis: Failure to progress in labor [O62.2], fetal intolerance      Post-Op Diagnosis: Same as preoperative diagnosis. Procedure(s):   SECTION--primary low transverse    Surgeon(s):  Tabitha Michaud MD Lendon Major, MD    Surgical Assistant: Surg Asst-1: Hartley Finger    Anesthesia: Epidural     Estimated Blood Loss (mL): [phone] ml    Complications: None    Specimens: * No specimens in log *     Implants: surgicell powder  Drains: * No LDAs found *    Findings: vtx, unengaged, VMI, direct OP. Apgars 9,9, Wt 5#10 oz. Placenta intact, 3VC.  Normal anatomy noted     Electronically Signed by Ava Ruiz MD on 2/3/2021 at 3:05 PM

## 2021-02-03 NOTE — PROGRESS NOTES
Labor Progress Note  Patient seen, fetal heart rate and contraction pattern evaluated, patient examined. Patient is comfortable with epidural.  She feels numb in her chest and can't feel herself breathe. Visit Vitals  BP (!) 113/41   Pulse 64   Temp 98.7 °F (37.1 °C)   Resp 16   Ht 5' 4\" (1.626 m)   Wt 143.8 kg (317 lb)   SpO2 100%   BMI 54.41 kg/m²       Physical Exam:  Cervical Exam  Dilation (cm): 5  Eff: 60 %  Station: -2  Vaginal exam done by? : Zari GRAVES  Membrane Status: AROM     23 y.o.  appears anxious    Uterine Activity: Frequency: Every 3 minutes, Duration: 90 seconds, Intensity: strong and 240 [place] units  Fetal Heart Rate: Baseline: 125 per minute  Variability: moderate  Accelerations: yes  Decelerations: none  Category: 1  No results found for this or any previous visit (from the past 12 hour(s)). Assessment/Plan:  Patient Active Problem List   Diagnosis Code    Pregnancy Z34.90    Intrauterine growth restriction, antepartum, third trimester, not applicable or unspecified fetus O36.5930    Obesity complicating childbirth A12.169    Body mass index 50.0-59.9, adult ([facility] Z68.43    39 weeks gestation of pregnancy Z3A.39     Intrauterine growth restriction, antepartum, third trimester, not applicable or unspecified fetus  Epidural dose was a little high. She developed recurrent late fhr decelerations, that have stopped. She received ephedrine and iv fluids. Oxytocin was stopped.   BP is better  fhr looks better  Will let recover then restart oxytocin    Mae Mchugh MD  2021

## 2021-02-03 NOTE — PROGRESS NOTES
Labor Progress Note  Patient seen, fetal heart rate and contraction pattern evaluated, patient examined. CTSP for prolonged deceleration. No complaints. Patient Vitals for the past 1 hrs:   Temp   02/03/21 1401 98.8 °F (37.1 °C)       Physical Exam:  Cervical Exam:  6 cm dilated    % effaced    -1 station  With prominent caput  Membranes:  s/p AROM  Uterine Activity: Frequency: Every 1.5-2 minutes  Fetal Heart Rate: Baseline: 125 per minute  Variability: moderate  Accelerations: yes  Decelerations: late    Assessment/Plan:  category II tracing as it had recovered by the time I arrived. no cervical change in the last hour. No significant cervical change in over 6 hours with adequate labor. Also taking into consideration the FHR tracing and  the suspected IUGR , primary LTCS is recommended. pt is agreeable.

## 2021-02-03 NOTE — LACTATION NOTE
This note was copied from a baby's chart. LC present for baby's first time at breast. Mother has large breasts and flat nipples. Baby in modified football hold due to mother's breast size. Baby was latched on right breast with nipple shield. He did take a few suckles and was sleepy on breast. AMRISOL taught mother hand expression and 10 drops of colostrum expressed and given to baby on finger. Mother plans to breast/formula feed her baby. Instructed mother to offer baby her breast milk first so baby can get her antibodies. Mother has a breast pump for home use. Discussed with mother her plan for feeding. Reviewed the benefits of exclusive breast milk feeding during the hospital stay. Informed her of the risks of using formula to supplement in the first few days of life as well as the benefits of successful breast milk feeding; referred her to the Breastfeeding booklet about this information. She acknowledges understanding of information reviewed and states that it is her plan to breast/formula feed her infant. Will support her choice and offer additional information as needed. Encouraged mom to attempt feeding with baby led feeding cues. Just as sucking on fingers, rooting, mouthing. Looking for 8-12 feedings in 24 hours. Don't limit baby at breast, allow baby to come of breast on it's own. Baby may want to feed  often and may increase number of feedings on second day of life. Skin to skin encouraged. If baby doesn't nurse,  Mom should  hand express  10-20 drops of colostrum and drip into baby's mouth, or give to baby by finger feeding, cup feeding, or spoon feeding at least every 2-3 hours. Mother will successfully establish breastfeeding by feeding in response to early feeding cues   or wake every 3h, will obtain deep latch, and will keep log of feedings/output. Taught to BF at hunger cues and or q 2-3 hrs and to offer 10-20 drops of hand expressed colostrum at any non-feeds.       Breast Assessment  Left Breast: Extra large  Left Nipple: Flat, Intact  Right Breast: Extra large  Right Nipple: Flat, Intact  Breast- Feeding Assessment  Attends Breast-Feeding Classes: Yes(virtual class)  Breast-Feeding Experience: No  Breast Trauma/Surgery: No  Type/Quality: Attempted  Lactation Consultant Visits  Breast-Feedings: Attempted breast-feeding(Mother has flat nipples. Baby did latch on with nipple shield and did a few suckles. LC taught hand expression - 10 drops expressed and finger fed to baby.)  Mother/Infant Observation  Mother Observation: Alignment, Holds breast, Close hold  Infant Observation: Opens mouth, Latches nipple and aereolae, Lips flanged, lower  LATCH Documentation  Latch: (Baby did a few suckles/10 drops colostrum expressed for baby)  Mother given breastfeeding handouts and LC#.

## 2021-02-03 NOTE — ASSESSMENT & PLAN NOTE
She developed recurrent late fhr decelerations, contractions were coupling and montevideo units 260. We cut oxytocin  To 8 jon-units per minute, from 16. FHR is now category 1, and Lyndon units are 180.   Will continue with labor, and increse oxytocin by 1 mu/min

## 2021-02-03 NOTE — PROGRESS NOTES
Labor Progress Note  Patient seen, fetal heart rate and contraction pattern evaluated, patient examined. Still feeling some pressure  Patient Vitals for the past 1 hrs:   BP Temp Pulse Resp SpO2   02/03/21 1250     100 %   02/03/21 1245     100 %   02/03/21 1240     100 %   02/03/21 1235     100 %   02/03/21 1232 (!) 125/55  (!) 54     02/03/21 1230     100 %   02/03/21 1225     100 %   02/03/21 1220     100 %   02/03/21 1215     100 %   02/03/21 1210     100 %   02/03/21 1205  98.8 °F (37.1 °C)  16 100 %       Physical Exam:  Cervical Exam:  6-7 cm dilated    % effaced    -1 station  With increased caput  Membranes:  s/p AROM  Uterine Activity: Frequency: Every 1.5-4 minutes  Fetal Heart Rate: Baseline: 125 per minute  Variability: moderate  Accelerations: yes  Decelerations: variable and late (again after checking her on her back)    Assessment/Plan:  Reassuring fetal status. Minimal  cervical change in >4 hours with mostly adequate labor. D/w pt and family.  Will recheck in about an hour and if no further change probably proceed with primary c/s

## 2021-02-03 NOTE — PROGRESS NOTES
Labor Progress Note  Late entry exam 21 at 2330  Patient seen, fetal heart rate and contraction pattern evaluated, patient examined. Patient is comfortable with epidural.  Visit Vitals  BP (!) 104/41   Pulse 66   Temp 98.6 °F (37 °C)   Resp 16   Ht 5' 4\" (1.626 m)   Wt 143.8 kg (317 lb)   SpO2 100%   BMI 54.41 kg/m²       Physical Exam:  Cervical Exam  Dilation (cm): 5  Eff: 90 %  Station: -2  Vaginal exam done by? : Dr. Alverto Barker Status: AROM     23 y.o.  in no acute distress. Uterine Activity: Frequency: Every 3 minutes, Duration: 60 seconds, Intensity: strong and 240 [place] units  Fetal Heart Rate: Baseline: 130 per minute  Variability: moderate  Accelerations: yes  Decelerations: none  Category: 1  No results found for this or any previous visit (from the past 12 hour(s)).     Assessment/Plan:  Patient Active Problem List   Diagnosis Code    Pregnancy Z34.90    Intrauterine growth restriction, antepartum, third trimester, not applicable or unspecified fetus O36.5930    Obesity complicating childbirth Y95.945    Body mass index 50.0-59.9, [facility] Z68.43    39 weeks gestation of pregnancy Z3A.39     Intrauterine growth restriction, antepartum, third trimester, not applicable or unspecified fetus  Still latent phase labor  No cervical change, except effacement  Continue oxytocin  Good contraction pattern    Dorothy Mancini MD  2/3/2021

## 2021-02-03 NOTE — DISCHARGE SUMMARY
Obstetrical Discharge Summary     Name: Milton Cooper MRN: [id number]  SSN: [id number]    YOB: 1995  Age: 23 y.o. Sex: female      Allergies: Patient has no known allergies. Admit Date: 2021    Discharge Date: 2021     Admitting Physician: Renaee Cushing, MD     Attending Physician:  Marilou German MD     * Admission Diagnoses: Pregnancy [Z34.90]    * Discharge Diagnoses:   Information for the patient's :  Marce [place] Male Maren Lesch [[id number]]   Delivery of a 2.555 kg male infant via , Low Transverse on 2/3/2021 at 2:25 PM  by Renaee Cushing. Apgars were 9  and 9 .      Intact     Additional Diagnoses:   Hospital Problems as of 2/3/2021 Date Reviewed: 2021          Codes Class Noted - Resolved POA    Abnormality in fetal heart rate and rhythm complicating labor and delivery ICD-10-CM: O76  ICD-9-CM: 659.70  2/3/2021 - Present No        Streptococcus B carrier state complicating childbirth UOL-70-CW: O99.824  ICD-9-CM: 648.91, V02.51  2/3/2021 - Present Yes        Intrauterine growth restriction, antepartum, third trimester, not applicable or unspecified fetus ICD-10-CM: O36.5930  ICD-9-CM: 656.53  2021 - Present Yes        Obesity complicating childbirth UZV-86-TV: O99.214  ICD-9-CM: 649.11  2021 - Present Yes        Body mass index 50.0-59.9, adult ([facility] [address] [facility] ICD-10-CM: Z68.43  ICD-9-CM: V85.43  2021 - Present Yes        39 weeks gestation of pregnancy ICD-10-CM: Z3A.39  ICD-9-CM: V22.2  2021 - Present Yes        Pregnancy ICD-10-CM: Z34.90  ICD-9-CM: V22.2  2021 - Present Yes             Lab Results   Component Value Date/Time    ABO/Rh(D) O POSITIVE 2021 04:30 AM    Rubella, External Immune 2020    GrBStrep, External Positive 2021    ABO,Rh O Positive 2020      Immunization History   Administered Date(s) Administered    Tdap 2020       * Procedures: primary LTCS by Dr Maryana Sellers  Procedure(s):   SECTION           * Discharge Condition: good    * Hospital Course: Normal hospital course following the delivery. * Disposition: Home    Discharge Medications:   Current Discharge Medication List          * Follow-up Care/Patient Instructions:   Activity: No sex for 6 weeks and No heavy lifting for 6 weeks  Diet: Regular Diet  Wound Care: As directed    RTO in a week for dressing removal or prn    Follow-up Information    None

## 2021-02-03 NOTE — PROGRESS NOTES
9248: Bedside and Verbal shift change report given to Lori Út [address]. (oncoming nurse) by Barbra Maria RN (offgoing nurse). Report included the following information SBAR, Kardex, Intake/Output, MAR, Recent Results and Med Rec Status. 0813: Dr. Margy Ngo at bedside, SVE per MD /-1. MD discussing plan of care with pt due to minimal cervical change we will continue with increasing pitocin as needed and he will recheck her around noon and if no change will proceed with a , pt agreeable with plan of care and understands. 0830: Dr. Margy Ngo updated on EFM tracing and interventions. 0840: Dr. Margy Ngo reviewing EFM tracing, MD stated for this RN to increase pitocin at this time. 0935: IUPC flushed with 10 cc of normal saline. 1050: Dr. Margy Ngo given update on EFM tracing and reviewing it himself, per MD go up on pitocin by 1 mu.    1132: This Rn spoke with Dr. Tha Arce stated he reviewed EFM tracing and for this RN to increase pitocin to 20mu. 1250: Dr. Margy Ngo at bedside, SVE per MD . MD stated he would be back to bedside shortly and make a decision on plan of care, pt verbalized understandind. 1300: Dr. Margy Ngo back at bedside, MD stated, he can come back in an hour and recheck pt. And make a decision. Pt agreeable with POC. 1310: Dr. Margy Ngo at nurses station and aware of repetitive variable/late decels, MD stated to continue with pitocin. 1330: Dr. Margy Ngo ordering 2mg IVP propranolol for protraction of labor. 1344: This RN spoke with pharmacy regarding propranolol verification. Pharmacy asked for RN to recheck with OB about order since pt has a lower BP and HR.     1345: Dr. Margy Ngo aware of pharmacy concerns. Per MD will hold off on giving propranolol. MD also updated on recent late/variable decels, MD stated he would review tracing.    1356: Dr. Margy Ngo at bedside, SVE per MD unchanged.     1357: C-section called by Dr. Margy Ngo for failure to progress and fetal intolerance of labor. 1404: Pt in OR 2 for . 4887-2073: FHTs noted to in 140s with variable. 1425: Delivery of infant via  see delivery summary and  log for details. 1508: Pt back in L&D room 206 for recovery. 1905: Bedside and Verbal shift change report given to Lynn Pennington RN (oncoming nurse) by Rachel Stevens RN (offgoing nurse). Report included the following information SBAR, Kardex, OR Summary, Intake/Output, MAR, Recent Results and Med Rec Status.

## 2021-02-03 NOTE — PROGRESS NOTES
18 -  [facility] called to update on fetal tracing. MD to come to bedside to evaluate.

## 2021-02-03 NOTE — PROGRESS NOTES
Labor Progress Note  Patient seen, fetal heart rate and contraction pattern evaluated, patient examined. Patient is comfortable with epidural.  Visit Vitals  BP (!) 105/51   Pulse 69   Temp 98.8 °F (37.1 °C)   Resp 16   Ht 5' 4\" (1.626 m)   Wt 143.8 kg (317 lb)   SpO2 100%   BMI 54.41 kg/m²       Physical Exam:  23 y.o.  in no acute distress. Cervical Exam:  6 cm dilated    100% effaced    -2 station    Presenting Part: cephalic  Membranes:  Artificial Rupture of Membranes; Amniotic Fluid: medium amount of clear fluid  Uterine Activity: Frequency: Every 3 minutes, Duration: 60 seconds, Intensity: strong and 180 - 200 montevideo units  Fetal Heart Rate: Baseline: 130 per minute  Variability: moderate  Accelerations: yes  Decelerations: none  Category: 1  No results found for this or any previous visit (from the past 12 hour(s)). Assessment/Plan:  Patient Active Problem List   Diagnosis Code    Pregnancy Z34.90    Intrauterine growth restriction, antepartum, third trimester, not applicable or unspecified fetus O36.5930    Obesity complicating childbirth W36.413    Body [facility] 50.0-59.9, [facility] Z68.43    39 weeks gestation of pregnancy Z3A.39    Abnormality in fetal heart rate and rhythm complicating labor and delivery O76    Streptococcus B carrier state complicating childbirth Z87.727     Abnormality in fetal heart rate and rhythm complicating labor and delivery  She developed recurrent late fhr decelerations, contractions were coupling and monte[place] units 260. We cut oxytocin  To 8 jon-units per minute, from 16. FHR is now category 1, and Er[place] units are 180.   Will continue with labor, and increse oxytocin by 1 mu/min    Streptococcus B carrier state complicating childbirth  Penicillin    Juan Collins MD  2/3/2021

## 2021-02-04 LAB
BASOPHILS # BLD: 0 K/UL (ref 0–0.1)
BASOPHILS NFR BLD: 0 % (ref 0–1)
DIFFERENTIAL METHOD BLD: ABNORMAL
EOSINOPHIL # BLD: 0.1 K/UL (ref 0–0.4)
EOSINOPHIL NFR BLD: 1 % (ref 0–7)
ERYTHROCYTE [DISTWIDTH] IN BLOOD BY AUTOMATED COUNT: 15 % (ref 11.5–14.5)
HCT VFR BLD AUTO: 32.5 % (ref 35–47)
HGB BLD-MCNC: 10.6 G/DL (ref 11.5–16)
IMM GRANULOCYTES # BLD AUTO: 0.1 K/UL (ref 0–0.04)
IMM GRANULOCYTES NFR BLD AUTO: 0 % (ref 0–0.5)
LYMPHOCYTES # BLD: 1.7 K/UL (ref 0.8–3.5)
LYMPHOCYTES NFR BLD: 14 % (ref 12–49)
MCH RBC QN AUTO: 28 PG (ref 26–34)
MCHC RBC AUTO-ENTMCNC: 32.6 G/DL (ref 30–36.5)
MCV RBC AUTO: 85.8 FL (ref 80–99)
MONOCYTES # BLD: 0.7 K/UL (ref 0–1)
MONOCYTES NFR BLD: 6 % (ref 5–13)
NEUTS SEG # BLD: 10 K/UL (ref 1.8–8)
NEUTS SEG NFR BLD: 79 % (ref 32–75)
NRBC # BLD: 0 K/UL (ref 0–0.01)
NRBC BLD-RTO: 0 PER 100 WBC
PLATELET # BLD AUTO: 195 K/UL (ref 150–400)
PMV BLD AUTO: 11.3 FL (ref 8.9–12.9)
RBC # BLD AUTO: 3.79 M/UL (ref 3.8–5.2)
WBC # BLD AUTO: 12.6 K/UL (ref 3.6–11)

## 2021-02-04 PROCEDURE — 36415 COLL VENOUS BLD VENIPUNCTURE: CPT

## 2021-02-04 PROCEDURE — 2709999900 HC NON-CHARGEABLE SUPPLY

## 2021-02-04 PROCEDURE — 85025 COMPLETE CBC W/AUTO DIFF WBC: CPT

## 2021-02-04 PROCEDURE — 77030036554

## 2021-02-04 PROCEDURE — 74011250637 HC RX REV CODE- 250/637: Performed by: OBSTETRICS & GYNECOLOGY

## 2021-02-04 PROCEDURE — 65270000029 HC RM PRIVATE

## 2021-02-04 RX ADMIN — OXYCODONE AND ACETAMINOPHEN 1 TABLET: 5; 325 TABLET ORAL at 10:38

## 2021-02-04 RX ADMIN — IBUPROFEN 800 MG: 800 TABLET, FILM COATED ORAL at 14:21

## 2021-02-04 RX ADMIN — IBUPROFEN 800 MG: 800 TABLET, FILM COATED ORAL at 22:46

## 2021-02-04 RX ADMIN — OXYCODONE AND ACETAMINOPHEN 2 TABLET: 5; 325 TABLET ORAL at 18:40

## 2021-02-04 RX ADMIN — OXYCODONE AND ACETAMINOPHEN 2 TABLET: 5; 325 TABLET ORAL at 22:46

## 2021-02-04 RX ADMIN — IBUPROFEN 800 MG: 800 TABLET, FILM COATED ORAL at 04:48

## 2021-02-04 RX ADMIN — OXYCODONE AND ACETAMINOPHEN 2 TABLET: 5; 325 TABLET ORAL at 14:46

## 2021-02-04 RX ADMIN — OXYCODONE AND ACETAMINOPHEN 1 TABLET: 5; 325 TABLET ORAL at 05:44

## 2021-02-04 NOTE — PROGRESS NOTES
PostPartum Note    Urszula Rubio  [id number]  1995  23 y.o.    S:  Ms. Urszula Rubio is a 23 y.o.  POD #1 s/p LTCS @ 39w3d. Doing well. She had a baby boy. Her lochia is like a period. She describes her pain as mild and is well controlled with PO medications. She is breast and bottle feeding and this is going well. She is ambulating and voiding. Tolerating PO intake. O:   Visit Vitals  /60   Pulse 71   Temp 98.5 °F (36.9 °C)   Resp 16   Ht 5' 4\" (1.626 m)   Wt 143.8 kg (317 lb)   SpO2 99%   Breastfeeding Unknown   BMI 54.41 kg/m²       Lab Results   Component Value Date/Time    WBC 12.6 (H) 2021 04:16 AM    HGB 10.6 (L) 2021 04:16 AM    HCT 32.5 (L) 2021 04:16 AM    PLATELET 433  04:16 AM    MCV 85.8 2021 04:16 AM    Hgb, External 13.2 2020    Hct, External 38.6 2020       Gen - No acute distress  Abdomen - Fundus firm, below the umbilicus, incision c/d/i w RANDI dressing  Ext - Warm, well perfused. Nontender    A/P:  POD #1 s/p LTCS @ 39w3d doing well. 1.  Routine PP instructions/ care discussed  2. Blood type - Rh pos  3. Rubella imm  4. Circumcision desired and consented   5. Discharge home tomorrow   6. F/U 4-6 weeks for PP check.       Rafita Ladd MD  [facility]

## 2021-02-04 NOTE — LACTATION NOTE
This note was copied from a baby's chart. Mother states she has been trying to breastfeed but is having difficulty getting baby to latch on. She is using a nipple shield. Baby latched on well to right breast with a nipple shield in football hold and nursed for 10 minutes then fell asleep. Drops of colostrum seen in shield. Baby had a good rhythmic sucking pattern and swallows heard. Reviewed breastfeeding basics:  Supply and demand, breastfeed baby 8-12 times in 24 hr., feed baby on demand,  stomach size, early  Feeding cues, skin to skin, positioning and baby led latch-on, assymetrical latch with signs of good, deep latch vs shallow, feeding frequency and duration, and log sheet for tracking infant feedings and output. Breastfeeding Booklet and Warm line information given. Discussed typical  weight loss and the importance of infant weight checks with pediatrician 1-2 post discharge. Shield use recommended due to flat nipples/baby having latch difficulty,  use of shield affords deeper more comfortable latching with sustained rhythmic suckling and intermittent swallowing noted. Proper care, application and use of shield discussed; anticipatory guidance shared. Mother will successfully establish breastfeeding by feeding in response to early feeding cues   or wake every 3h, will obtain deep latch, and will keep log of feedings/output. Taught to BF at hunger cues and or q 2-3 hrs and to offer 10-20 drops of hand expressed colostrum at any non-feeds. Mother has a Motif pump for home use. Instructions for use given. Breast Assessment  Left Breast: Extra large  Left Nipple: Flat, Intact  Right Breast: Extra large  Right Nipple: Flat, Intact  Breast- Feeding Assessment  Attends Breast-Feeding Classes: Yes  Breast-Feeding Experience: No  Breast Trauma/Surgery: No  Type/Quality: Attempted  Lactation Consultant Visits  Breast-Feedings: Good (Mother has flat nipples.  Baby latched on well with nipple shield on right breast in football hold. He nursed for 10 minutes. Colostrum seen in shield which was given to baby.  Mother to pump TID to stimulate supply since she is using a shield.)  Mother/Infant Observation  Mother Observation: Alignment, Holds breast, Breast comfortable, Lets baby end feeding, Close hold  Infant Observation: Audible swallows, Lips flanged, upper, Opens mouth, Frenulum checked, Rhythmic suck, Feeding cues, Relaxed after feeding, Latches nipple and aereolae, Lips flanged, lower  LATCH Documentation  Latch: Grasps breast, tongue down, lips flanged, rhythmic sucking  Audible Swallowing: A few with stimulation  Type of Nipple: Flat  Comfort (Breast/Nipple): Soft/non-tender  Hold (Positioning): Full assist, teach one side, mother does other, staff holds  [facility] Score: 7

## 2021-02-04 NOTE — OP NOTES
[facility]  OPERATIVE REPORT    Name:  Bela Kaur  MR#:  [id number]  :  1995  ACCOUNT #:  [de-identified]  DATE OF SERVICE:  2021    PREOPERATIVE DIAGNOSES:  1.  39+-week intrauterine pregnancy with intrauterine growth restriction. 2.  Failure to progress in labor. 3.  Fetal intolerance to labor. POSTOPERATIVE DIAGNOSES:  1.  39+-week intrauterine pregnancy with intrauterine growth restriction. 2.  Failure to progress in labor. 3.  Fetal intolerance to labor. PROCEDURE PERFORMED:  Primary low transverse  section. SURGEON:  Nelson Smith MD    ASSISTANT:  Alisha Browne MD and Marcela     ANESTHESIA:  Epidural.    COMPLICATIONS:  None. SPECIMENS REMOVED:  None. IMPLANTS:  Surgicel powder. ESTIMATED BLOOD LOSS:  750-1000 mL. ACCESSORIES:  A Villafana catheter and serial compression devices. DISPOSITION:  The patient appeared to tolerate the procedure well and was transferred to the recovery room in stable condition. FINDINGS:  A vertex unengaged viable male infant, direct OP. Apgars were 9 and 9 and weight is 5 pounds 10 ounces. The placenta was intact. Three-vessel cord. Normal anatomy was otherwise noted. INDICATION FOR SURGERY:  The patient is a 74-year-old  1, para 0 who was admitted 2 days ago for induction of labor secondary to intrauterine growth restriction. She had a Cook catheter placed for cervical ripening and the following morning, her cervix was 5-6 cm. She was started on Pitocin and began to have contractions that were pretty regular and an epidural.  Shortly thereafter, examination showed the cervix again to be 5-6 cm. Amniotomy was performed. An internal pressure catheter and a fetal scalp electrode were placed. Over the course of the next 24 hours, the cervix which was consistent with mechanical dilation did progress where the head became slightly better applied to the cervix.   The cervix began to efface a little bit and made it to approximately 6-7 cm. It remained there for well over 6 hours in the face of adequate labor measured by intrauterine pressure catheter. During the course of her labor, she had periods of time where she would have late decelerations and sometimes prolonged late decelerations. These usually recovered very easily with different maneuvers. Approximately 2 hours prior to the  section, the patient was checked and her cervix had remained unchanged with adequate labor and it was decided to give her another hour. Towards the end of that hour, the patient had a prolonged late deceleration with no cervical change and it was felt at this time that the  section was indicated. PROCEDURE:  The fetal heart rate had recovered so the patient was then taken back to the operating room where she was placed on the table in supine position with left uterine displacement. The fetal heart was noted to be stable at this point and she was then prepped and draped in the usual sterile fashion using a ChloraPrep solution. She had already had a Villafana catheter placed and I should note that prior to the procedure, the urine was blood-tinged. I should also note that prior to prepping the patient, the pannus was taped back to allow better exposure for a Pfannenstiel incision. After adequate time was given for the ChloraPrep solution to dry, she was then draped in the usual sterile fashion and the procedure was begun. A Pfannenstiel incision was made with sharp dissection through the subcutaneous tissue down to the fascia using a Bovie. The fascia was incised sharply in the midline and the incision was carried laterally to both sides using the Bovie. The rectus fascia was then  from the underlying rectus muscles by sharp and blunt dissection. The rectus muscles were  in the midline by sharp and blunt dissection.   The parietal peritoneum was identified and entered bluntly. The defect in the peritoneum was extended bluntly. Next, a bladder blade was placed and a bladder flap was then developed. A transverse incision was made in the lower uterine segment. Upon entering the amniotic cavity, just a minimal amount of clear fluid was noted. The uterine incision was extended laterally to both sides by digital dissection. Next, I placed my hand into the amniotic cavity and easily dislodged the fetal vertex which was unengaged in a direct OP position. The head was flexed and brought out through the incision. With some fundal pressure, the fetal vertex was delivered. The mouth and nose were suctioned on the patient's abdomen and the infant was then delivered in its entirety. After delayed cord clamping of at least 30 seconds, the cord was doubly clamped and cut. The infant was handed over to the waiting nursery personnel. Apgars were found to be 9 and 9 and weight was found to be 5 pounds 10 ounces. The placenta was then removed using cord traction and fundal massage and was intact. The uterus was exteriorized as best we could. The endometrial cavity was explored and cleared of any remaining membranes. The uterine incision was then closed in two layers, the first being a running interlocking 0 Monocryl suture ligature, the second was a reinforcement of the first using a 0 Monocryl suture in imbricated suture. I should note that angled sutures were placed on either side with my hand around the broad ligament. There was a little bit of bleeding noted from the incision slightly to the left of midline and several figure-of-eight 2-0 chromic suture ligatures were placed with good hemostasis noted. I should note that with delivery of the placenta, the patient did get 30 units of Pitocin per liter of IV fluid. I also had Anesthesia give the patient TXA after delivery of the placenta as well for prophylaxis.   With the uterine incision repaired and hemostatic and normal anatomy was noted, the uterus was placed back into the pelvis and the gutters were explored and cleared of any clots and fluid. The lower uterine segment and vesicouterine peritoneum were visualized with good hemostasis noted. Surgicel powder was then placed over the lower segment of the bladder flap for reassurance. Next, the parietal peritoneum was closed using running 2-0 chromic suture ligature. The subfascial area was explored with good hemostasis noted. The fascia was then closed using 0 PDS beginning at the patient's left side and running to her right side and burying the knot. The subcutaneous tissue was explored with good hemostasis noted. The subcu was then reapproximated using a running 2-0 plain suture ligature and the skin was then closed using the INSORB subcuticular stapler. All counts were  correct. The patient did appear to tolerate the procedure well. It should be noted the patient did get 3 g of Ancef IV prior to the procedure. She had SCDs on throughout the case. She got the Pitocin and the TXA as mentioned. At this point, the patient's abdomen was cleaned, covered with a sterile RANDI dressing, and then she was transferred to the recovery room in stable condition.       MD SONI Dacosta/[email or web address]_I/  D:  02/03/2021 15:24  T:  02/03/2021 21:20  JOB #:  [id number]  CC:  Shantell Pantoja MD

## 2021-02-04 NOTE — ROUTINE PROCESS
Bedside and Verbal shift change report given to HECTOR Willson RN (oncoming nurse) by Lisa Pope RN (offgoing nurse). Report included the following information SBAR, Kardex, Intake/Output and MAR.

## 2021-02-04 NOTE — ROUTINE PROCESS
Bedside and Verbal shift change report given to YEHUDA Chairez RN (oncoming nurse) by Ignacio Suh RN (offgoing nurse). Report included the following information SBAR, Kardex, Intake/Output, MAR and Recent Results.

## 2021-02-05 PROCEDURE — 74011250637 HC RX REV CODE- 250/637: Performed by: OBSTETRICS & GYNECOLOGY

## 2021-02-05 PROCEDURE — 65270000029 HC RM PRIVATE

## 2021-02-05 RX ORDER — IBUPROFEN 600 MG/1
600 TABLET ORAL
Status: DISCONTINUED | OUTPATIENT
Start: 2021-02-05 | End: 2021-02-06 | Stop reason: HOSPADM

## 2021-02-05 RX ADMIN — IBUPROFEN 800 MG: 800 TABLET, FILM COATED ORAL at 16:26

## 2021-02-05 RX ADMIN — OXYCODONE AND ACETAMINOPHEN 2 TABLET: 5; 325 TABLET ORAL at 11:25

## 2021-02-05 RX ADMIN — OXYCODONE AND ACETAMINOPHEN 2 TABLET: 5; 325 TABLET ORAL at 07:22

## 2021-02-05 RX ADMIN — OXYCODONE AND ACETAMINOPHEN 2 TABLET: 5; 325 TABLET ORAL at 02:49

## 2021-02-05 RX ADMIN — IBUPROFEN 800 MG: 800 TABLET, FILM COATED ORAL at 07:22

## 2021-02-05 RX ADMIN — OXYCODONE AND ACETAMINOPHEN 2 TABLET: 5; 325 TABLET ORAL at 16:26

## 2021-02-05 RX ADMIN — IBUPROFEN 600 MG: 600 TABLET, FILM COATED ORAL at 22:54

## 2021-02-05 RX ADMIN — OXYCODONE AND ACETAMINOPHEN 2 TABLET: 5; 325 TABLET ORAL at 20:31

## 2021-02-05 NOTE — PROGRESS NOTES
PostPartum Note    Latanya Warren  [id number]  1995  23 y.o.    S:  Ms. Latanya Warren is a 23 y.o.  POD #2 s/p LTCS @ 39w3d. Doing well. She had a baby boy. Her lochia is like a period. She describes her pain as mild and is well controlled with PO medications. She is breast feeding and this is going well. She is ambulating and voiding. Tolerating PO intake. O:   Visit Vitals  BP (!) 142/64 (BP 1 Location: Left upper arm, BP Patient Position: Sitting)   Pulse 76   Temp 98.1 °F (36.7 °C)   Resp 18   Ht 5' 4\" (1.626 m)   Wt 143.8 kg (317 lb)   SpO2 99%   Breastfeeding Unknown   BMI 54.41 kg/m²       Lab Results   Component Value Date/Time    WBC 12.6 (H) 2021 04:16 AM    HGB 10.6 (L) 2021 04:16 AM    HCT 32.5 (L) 2021 04:16 AM    PLATELET 508  04:16 AM    MCV 85.8 2021 04:16 AM    Hgb, External 13.2 2020    Hct, External 38.6 2020       Gen - No acute distress  Abdomen - Fundus firm, below the umbilicus ; RANDI intact   Ext - Warm, well perfused. Nontender    A/P:  POD #2 s/p LTCS @ 39w3d doing well. 1.  Routine PP instructions/ care discussed  2. Blood type - Rh +  3. Rubella imm  4. Circumcision desired    5. Discharge POD#3    6. F/U 4-6 weeks for PP check.       Jagruti Johnson MD  [facility]

## 2021-02-05 NOTE — ROUTINE PROCESS
Bedside and Verbal shift change report given to Patricia Pak RN (oncoming nurse) by Radha Bolanos. Jefferson Chacon RN (offgoing nurse). Report included the following information SBAR, Kardex, Intake/Output, MAR and Recent Results.

## 2021-02-05 NOTE — LACTATION NOTE
This note was copied from a baby's chart. Mother has been breastfeeding baby with nipple shield. She is pumping TID since she is using a shield to help stimulate her breast milk supply. Mother tried to get baby to latch on without shield during visit - baby was having difficulty latching on and became fussy - nipple shield applied to left breast and baby latched on and breast fed well for 10 minutes then came off breast and fell asleep. Colostrum seen in shield - drops given to baby. Current infant weight loss is -4.7% (WNL). Reviewed breastfeeding basics:  Supply and demand, breastfeed baby 8-12 times in 24 hr., feed baby on demand,  stomach size, early  Feeding cues, skin to skin, positioning and baby led latch-on, assymetrical latch with signs of good, deep latch vs shallow, feeding frequency and duration, and log sheet for tracking infant feedings and output. Breastfeeding Booklet and Warm line information given. Discussed typical  weight loss and the importance of infant weight checks with pediatrician 1-2 post discharge. Discussed relief measures if engorgement occurs. Engorgement Care Guidelines:  Reviewed how milk is made and normal phases of milk production. Taught care of engorged breasts - frequent breastfeeding encouraged, cool packs and motrin as tolerated. Anticipatory guidance shared. Shield use recommended due to flat nipples/baby having latch difficulty; use of shield affords deeper more comfortable latching with sustained rhythmic suckling and intermittent swallowing noted. Proper care, application and use of shield discussed; anticipatory guidance shared. Mother will successfully establish breastfeeding by feeding in response to early feeding cues   or wake every 3h, will obtain deep latch, and will keep log of feedings/output. Taught to BF at hunger cues and or q 2-3 hrs and to offer 10-20 drops of hand expressed colostrum at any non-feeds.       Breast Assessment  Left Breast: Extra large  Left Nipple: Flat, Intact  Right Breast: Extra large  Right Nipple:  Intact, Flat  Breast- Feeding Assessment  Attends Breast-Feeding Classes: Yes  Breast-Feeding Experience: No  Breast Trauma/Surgery: No  Type/Quality: Good  Lactation Consultant Visits  Breast-Feedings: Good   Mother/Infant Observation  Mother Observation: Alignment, Holds breast, Lets baby end feeding, Close hold, Recognizes feeding cues  Infant Observation: Audible swallows, Lips flanged, upper, Opens mouth, Feeding cues, Relaxed after feeding, Frenulum checked, Rhythmic suck, Latches nipple and aereolae, Lips flanged, lower  LATCH Documentation  Latch: Grasps breast, tongue down, lips flanged, rhythmic sucking  Audible Swallowing: A few with stimulation  Type of Nipple: Flat  Comfort (Breast/Nipple): Soft/non-tender  Hold (Positioning): No assist from staff, mother able to position/hold infant  LATCH Score: 8

## 2021-02-06 VITALS
HEART RATE: 76 BPM | RESPIRATION RATE: 16 BRPM | OXYGEN SATURATION: 99 % | SYSTOLIC BLOOD PRESSURE: 129 MMHG | HEIGHT: 64 IN | DIASTOLIC BLOOD PRESSURE: 64 MMHG | BODY MASS INDEX: 50.02 KG/M2 | TEMPERATURE: 97.8 F | WEIGHT: 293 LBS

## 2021-02-06 PROCEDURE — 2709999900 HC NON-CHARGEABLE SUPPLY

## 2021-02-06 PROCEDURE — 74011250637 HC RX REV CODE- 250/637: Performed by: OBSTETRICS & GYNECOLOGY

## 2021-02-06 RX ORDER — OXYCODONE AND ACETAMINOPHEN 5; 325 MG/1; MG/1
1 TABLET ORAL
Qty: 20 TAB | Refills: 0 | Status: SHIPPED | OUTPATIENT
Start: 2021-02-06 | End: 2021-02-09

## 2021-02-06 RX ADMIN — OXYCODONE AND ACETAMINOPHEN 2 TABLET: 5; 325 TABLET ORAL at 05:20

## 2021-02-06 RX ADMIN — IBUPROFEN 600 MG: 600 TABLET, FILM COATED ORAL at 05:00

## 2021-02-06 RX ADMIN — OXYCODONE AND ACETAMINOPHEN 2 TABLET: 5; 325 TABLET ORAL at 10:54

## 2021-02-06 RX ADMIN — IBUPROFEN 600 MG: 600 TABLET, FILM COATED ORAL at 10:54

## 2021-02-06 RX ADMIN — OXYCODONE AND ACETAMINOPHEN 2 TABLET: 5; 325 TABLET ORAL at 01:14

## 2021-02-06 NOTE — PROGRESS NOTES
PostPartum Note    Urszula Rubio  [id number]  1995  23 y.o.    S:  Ms. Urszula Rubio is a 23 y.o.  POD #3 s/p LTCS @ 39w3d. Doing well. She had a baby boy. Her lochia is like a period. She describes her pain as mild and is well controlled with PO medications. She is breast feeding and this is going well. She is ambulating and voiding. Tolerating PO intake. O:   Visit Vitals  /64 (BP 1 Location: Left upper arm, BP Patient Position: At rest;Sitting)   Pulse 76   Temp 97.8 °F (36.6 °C)   Resp 16   Ht 5' 4\" (1.626 m)   Wt 143.8 kg (317 lb)   SpO2 99%   Breastfeeding Unknown   BMI 54.41 kg/m²       Lab Results   Component Value Date/Time    WBC 12.6 (H) 2021 04:16 AM    HGB 10.6 (L) 2021 04:16 AM    HCT 32.5 (L) 2021 04:16 AM    PLATELET 182  04:16 AM    MCV 85.8 2021 04:16 AM    Hgb, External 13.2 2020    Hct, External 38.6 2020       Gen - No acute distress  Abdomen - Fundus firm, below the umbilicus ; bandage c/d/i   Ext - Warm, well perfused. Nontender    A/P:  POD #3 s/p LTCS @ 39w3d doing well. 1.  Routine PP instructions/ care discussed  2. Blood type - Rh +  3. Rubella imm  4. Circumcision today   5. Discharge today   6. F/U 4-6 weeks for PP check.       Luis Cosme MD  [facility]

## 2021-02-06 NOTE — LACTATION NOTE
This note was copied from a baby's chart. Reviewed breastfeeding basics:  How milk is made and normal  breastfeeding behaviors discussed. Supply and demand,  stomach size, early feeding cues, skin to skin bonding with comfortable positioning and baby led latch-on reviewed. How to identify signs of successful breastfeeding sessions reviewed; education on assymetrical latch, signs of effective latching vs shallow, in-effective latching, normal  feeding frequency and duration and expected infant output discussed. Normal course of breastfeeding discussed including the AAP's recommendation that children receive exclusive breast milk feedings for the first six months of life with breast milk feedings to continue through the first year of life and/or beyond as complimentary table foods are added. Breastfeeding Booklet and Warm line information provided with discussion. Discussed typical  weight loss and the importance of pediatrician appointment within 24-48 hours of discharge, at 2 weeks of life and normalcy of requesting pediatric weight checks as needed in between visits. Pt will successfully establish breastfeeding by feeding in response to early feeding cues   or wake every 3h, will obtain deep latch, and will keep log of feedings/output. Taught to BF at hunger cues and or q 2-3 hrs and to offer 10-20 drops of hand expressed colostrum at any non-feeds.       Breast Assessment  Left Breast: Extra large  Left Nipple: Flat, Intact  Right Breast: Extra large  Right Nipple: Flat, Intact  Breast- Feeding Assessment  Attends Breast-Feeding Classes: Yes  Breast-Feeding Experience: No  Breast Trauma/Surgery: No  Type/Quality: Good  Lactation Consultant Visits  Breast-Feedings: Good (with shield)  Mother/Infant Observation  Mother Observation: Alignment, Breast comfortable, Close hold  Infant Observation: Latches nipple and aereolae, Lips flanged, lower, Lips flanged, upper, Opens mouth(with shield)  LATCH Documentation  Latch: Grasps breast, tongue down, lips flanged, rhythmic sucking(with shield)  Audible Swallowing: A few with stimulation  Type of Nipple: Flat  Comfort (Breast/Nipple): Soft/non-tender  Hold (Positioning): No assist from staff, mother able to position/hold infant  LATCH Score: 8  Mom using nipple shield for flat nillpes . Pros and cons of nipple shield use reviewed. Patient instructed how to apply shield to nipple/areola and cleaning of nipple shield. Nipple shield plan of care includes breastfeeding with nipple shield per instructions. Reinforces with pt that nipple shield is best used as temporary tool/aid to help infant learn how to latch onto breast.  Recommend follow-up with OP lactation consultant after discharge from hospital.  Reviewed community resources for breastfeeding support. Mother has added pumping also, has not been getting anything but added pumping as baby has a 8% weight loss, encouraged mother to follow up with home ped by Monday for weight check. Chart shows numerous feedings, void, stool WNL. Discussed importance of monitoring outputs and feedings on first week of life. Discussed ways to tell if baby is  getting enough breast milk, ie  voids and stools, change in color of stool, and return to birth wt within 2 weeks. Follow up with pediatrician visit for weight check in 1-2 days (per AAP guidelines.)  Encouraged to call Warm Line  [phone]  for any questions/problems that arise. Mother also given breastfeeding support group dates and times for any future needs.

## 2021-02-06 NOTE — DISCHARGE INSTRUCTIONS
Discharge Instructions for  Section    Patient ID:  Aayush Munoz  [id number]  14 y.o.  1995    Continue taking your prenatal vitamins if you are breastfeeding. Follow-up care is a key part of your treatment and safety. Be sure to keep all your scheduled appointments    Activity  1. Avoid heavy lifting greater than 10lbs for 2 weeks after your surgery  2. Pelvic rest for 6 weeks, ie, nothing in your vagina for 6 weeks  (no intercourse, tampons, or douching). No tubs for 6 weeks. 3. No driving for 2 weeks and until you are no longer taking prescription pain medications and you can put your foot on the break in a hurry   4. Limit climbing stairs to only when necessary the first 1-2 weeks. Hold the railing and do not carry your baby up and downstairs at first for safety   5. You may walk as tolerated, though be care to not over-do it. Walking will assist in overall healing, decrease constipation and bloating. Mary Ann Hedges feel better more quickly with some daily movement. Diet  Regular diet as tolerated    Wound care  There are several types of incision closures. 1. If you have metal staples in place when you leave the hospital, please call your doctors office to schedule the staple removal as directed at discharge. 2. If you have steri strips covering your incision, you may remove them as they fall off or be sure to remove them about one week after your surgery. Removing them in the shower may be easier. 3. If you have Dermabond, or skin glue, covering your incision, it will fall off slowly in the next few weeks. You may remove it as it begins to peel off. Additional wound care  1. Clear or reddish drainage from your incision is normal.  It's best to leave it open to the air, but if there is drainage, you may cover the incision lightly with gauze, preferably without tape. 2.  Keep the incision clean and dry.     3. Numbness of the skin at or around your incision is normal and the feeling usually returns gradually. 4. Call your doctor if you have increasing drainage from your incision, an unpleasant odor, red streaks, an increase in pain, or if it appears to open. Pain Management  1. Continue prescription pain medication as written by discharging physician  2. Over the counter medications such as Tylenol and ibuprofen (Motrin or Advil) are also ideal.  These may be taken together or in alternating doses. You may  take the maximum dose:  Motrin or Advil (generic ibuprofen), either 3 tablets every 6 hours or 4 tablets every 8 hours. Your prescription medication conntains a narcotic mixed with Tylenol,so  you should not take any extra Tylenol or acetaminophen until you have reduced your prescription pain medication. The maximum dose is Tylenol or acetominophen 1000 mg every 6 hours (equivalent to 2 Extra Strength Tylenols every 6 hours). 3. After a few days, begin to replace the prescription medication with over the counter medications. Use the prescription medication if needed for more severe pain or at night. The prescription medication can be addictive if overused. 4. Add heating pad or sitz baths as needed. Constipation  1. Constipation is normal after surgery, especially while taking prescription narcotic pain medication. 2. Over the counter remedies including ducosate (Colace), take 1-2 capsules 1-2 times daily for soft stool as needed. You may also add/ try milk of magnesia or rectal remedies such as Dulcolax or Fleets enema. Recovery: What to Expect at Home  1. Fatigue is expected. Try to rest when you can and don't worry about doing housework or other tasks which can wait. 2. The soreness in your incision will improve significantly over the first 2 weeks, but it may take 6-8 weeks before you are completely recovered. 3. Back pain or general body aches or muscle soreness are expected and should improve with acetominophen or ibuprofen.   4. Leg swelling due to pregnancy and/or IV fluids given in the hospital will take about two weeks to resolve. 5. Most women experience some form of the \"Baby Blues\" after having a baby. Feeling emotional, tearful, frustrated, anxious, sad, and irritable some of the time is normal and go away after about 2 weeks. Adequate rest and help from your family will help. Take breaks from caring for the baby. Call your doctor if your symptoms seem severe, last more than 2 weeks, or seem to be getting worse instead of better. Get help immediately if you have thoughts of wanting to hurt yourself or others! Call your doctor or seek immediate medical care if you have:  Heavy vaginal bleeding, soaking through one or more pads an hour for several hours. Foul-smelling discharge from your vagina or incision. Consistent nausea and vomiting and cannot keep fluids down. Consistent pain that does not get better after you take pain medicine.   Sudden chest pain and shortness of breath  Signs of a blood clot: pain/ swelling/ increasing redness in your lower extremeties  Signs of infection: increased pain in your abdomen or vaginal area; red streaks, warmth, or tenderness of your breasts; fever of 100.5 F or greater

## 2021-02-10 ENCOUNTER — HOSPITAL ENCOUNTER (EMERGENCY)
Age: 26
Discharge: HOME | End: 2021-02-11
Attending: EMERGENCY MEDICINE
Payer: COMMERCIAL

## 2021-02-10 ENCOUNTER — APPOINTMENT (OUTPATIENT)
Dept: GENERAL RADIOLOGY | Age: 26
End: 2021-02-10
Attending: EMERGENCY MEDICINE
Payer: COMMERCIAL

## 2021-02-10 DIAGNOSIS — Z20.822 SUSPECTED COVID-19 VIRUS INFECTION: ICD-10-CM

## 2021-02-10 DIAGNOSIS — R31.9 URINARY TRACT INFECTION WITH HEMATURIA, SITE UNSPECIFIED: ICD-10-CM

## 2021-02-10 DIAGNOSIS — R50.9 FEBRILE ILLNESS, ACUTE: Primary | ICD-10-CM

## 2021-02-10 DIAGNOSIS — E16.2 HYPOGLYCEMIA: ICD-10-CM

## 2021-02-10 DIAGNOSIS — N39.0 URINARY TRACT INFECTION WITH HEMATURIA, SITE UNSPECIFIED: ICD-10-CM

## 2021-02-10 LAB
BASOPHILS # BLD: 0 K/UL (ref 0–0.1)
BASOPHILS NFR BLD: 0 % (ref 0–1)
COMMENT, HOLDF: NORMAL
DIFFERENTIAL METHOD BLD: ABNORMAL
EOSINOPHIL # BLD: 0.1 K/UL (ref 0–0.4)
EOSINOPHIL NFR BLD: 1 % (ref 0–7)
ERYTHROCYTE [DISTWIDTH] IN BLOOD BY AUTOMATED COUNT: 14.8 % (ref 11.5–14.5)
HCT VFR BLD AUTO: 34.5 % (ref 35–47)
HGB BLD-MCNC: 11.2 G/DL (ref 11.5–16)
IMM GRANULOCYTES # BLD AUTO: 0.1 K/UL (ref 0–0.04)
IMM GRANULOCYTES NFR BLD AUTO: 1 % (ref 0–0.5)
LACTATE BLD-SCNC: 0.56 MMOL/L (ref 0.4–2)
LYMPHOCYTES # BLD: 1.1 K/UL (ref 0.8–3.5)
LYMPHOCYTES NFR BLD: 15 % (ref 12–49)
MCH RBC QN AUTO: 27.5 PG (ref 26–34)
MCHC RBC AUTO-ENTMCNC: 32.5 G/DL (ref 30–36.5)
MCV RBC AUTO: 84.6 FL (ref 80–99)
MONOCYTES # BLD: 0.5 K/UL (ref 0–1)
MONOCYTES NFR BLD: 6 % (ref 5–13)
NEUTS SEG # BLD: 5.9 K/UL (ref 1.8–8)
NEUTS SEG NFR BLD: 77 % (ref 32–75)
NRBC # BLD: 0 K/UL (ref 0–0.01)
NRBC BLD-RTO: 0 PER 100 WBC
PLATELET # BLD AUTO: 307 K/UL (ref 150–400)
PMV BLD AUTO: 10.1 FL (ref 8.9–12.9)
RBC # BLD AUTO: 4.08 M/UL (ref 3.8–5.2)
SAMPLES BEING HELD,HOLD: NORMAL
WBC # BLD AUTO: 7.7 K/UL (ref 3.6–11)

## 2021-02-10 PROCEDURE — 87040 BLOOD CULTURE FOR BACTERIA: CPT

## 2021-02-10 PROCEDURE — 83605 ASSAY OF LACTIC ACID: CPT

## 2021-02-10 PROCEDURE — 71045 X-RAY EXAM CHEST 1 VIEW: CPT

## 2021-02-10 PROCEDURE — 87077 CULTURE AEROBIC IDENTIFY: CPT

## 2021-02-10 PROCEDURE — 99285 EMERGENCY DEPT VISIT HI MDM: CPT

## 2021-02-10 PROCEDURE — 93005 ELECTROCARDIOGRAM TRACING: CPT

## 2021-02-10 PROCEDURE — 36415 COLL VENOUS BLD VENIPUNCTURE: CPT

## 2021-02-10 PROCEDURE — 80053 COMPREHEN METABOLIC PANEL: CPT

## 2021-02-10 PROCEDURE — 86140 C-REACTIVE PROTEIN: CPT

## 2021-02-10 PROCEDURE — 74011250637 HC RX REV CODE- 250/637: Performed by: EMERGENCY MEDICINE

## 2021-02-10 PROCEDURE — 96374 THER/PROPH/DIAG INJ IV PUSH: CPT

## 2021-02-10 PROCEDURE — 87186 SC STD MICRODIL/AGAR DIL: CPT

## 2021-02-10 PROCEDURE — 85025 COMPLETE CBC W/AUTO DIFF WBC: CPT

## 2021-02-10 PROCEDURE — 84484 ASSAY OF TROPONIN QUANT: CPT

## 2021-02-10 RX ORDER — ACETAMINOPHEN 500 MG
1000 TABLET ORAL ONCE
Status: COMPLETED | OUTPATIENT
Start: 2021-02-10 | End: 2021-02-10

## 2021-02-10 RX ORDER — SODIUM CHLORIDE 0.9 % (FLUSH) 0.9 %
5-10 SYRINGE (ML) INJECTION AS NEEDED
Status: DISCONTINUED | OUTPATIENT
Start: 2021-02-10 | End: 2021-02-11 | Stop reason: HOSPADM

## 2021-02-10 RX ADMIN — ACETAMINOPHEN 1000 MG: 500 TABLET ORAL at 21:22

## 2021-02-10 NOTE — Clinical Note
[address]  [facility] EMERGENCY DEPT  [address] [id number]  [phone]    Work/School Note    Date: 2/10/2021    To Whom It May concern:    Sai Melgar was seen and treated today in the emergency room by the following provider(s):  Attending Provider: Gissel Bro MD.      Sai Melgar is excused from work/school on 2/11/2021 through 2/14/2021. She is medically clear to return to work/school on 2/15/2021.         Sincerely,          Deneen Wallace MD

## 2021-02-11 ENCOUNTER — APPOINTMENT (OUTPATIENT)
Dept: CT IMAGING | Age: 26
End: 2021-02-11
Attending: EMERGENCY MEDICINE
Payer: COMMERCIAL

## 2021-02-11 VITALS
SYSTOLIC BLOOD PRESSURE: 144 MMHG | DIASTOLIC BLOOD PRESSURE: 92 MMHG | HEART RATE: 90 BPM | RESPIRATION RATE: 16 BRPM | TEMPERATURE: 98.8 F | OXYGEN SATURATION: 100 %

## 2021-02-11 LAB
ALBUMIN SERPL-MCNC: 2.5 G/DL (ref 3.5–5)
ALBUMIN/GLOB SERPL: 0.5 {RATIO} (ref 1.1–2.2)
ALP SERPL-CCNC: 136 U/L (ref 45–117)
ALT SERPL-CCNC: 18 U/L (ref 12–78)
ANION GAP SERPL CALC-SCNC: 14 MMOL/L (ref 5–15)
APPEARANCE UR: ABNORMAL
AST SERPL-CCNC: 14 U/L (ref 15–37)
BACTERIA URNS QL MICRO: NEGATIVE /HPF
BILIRUB SERPL-MCNC: 0.3 MG/DL (ref 0.2–1)
BILIRUB UR QL: NEGATIVE
BUN SERPL-MCNC: 9 MG/DL (ref 6–20)
BUN/CREAT SERPL: 10 (ref 12–20)
CALCIUM SERPL-MCNC: 8.4 MG/DL (ref 8.5–10.1)
CHLORIDE SERPL-SCNC: 107 MMOL/L (ref 97–108)
CO2 SERPL-SCNC: 20 MMOL/L (ref 21–32)
COLOR UR: ABNORMAL
CREAT SERPL-MCNC: 0.89 MG/DL (ref 0.55–1.02)
CRP SERPL-MCNC: 8.94 MG/DL (ref 0–0.6)
EPITH CASTS URNS QL MICRO: ABNORMAL /LPF
GLOBULIN SER CALC-MCNC: 4.7 G/DL (ref 2–4)
GLUCOSE BLD STRIP.AUTO-MCNC: 65 MG/DL (ref 65–100)
GLUCOSE BLD STRIP.AUTO-MCNC: 81 MG/DL (ref 65–100)
GLUCOSE SERPL-MCNC: 55 MG/DL (ref 65–100)
GLUCOSE UR STRIP.AUTO-MCNC: NEGATIVE MG/DL
HGB UR QL STRIP: ABNORMAL
HYALINE CASTS URNS QL MICRO: ABNORMAL /LPF (ref 0–5)
KETONES UR QL STRIP.AUTO: 80 MG/DL
LEUKOCYTE ESTERASE UR QL STRIP.AUTO: ABNORMAL
NITRITE UR QL STRIP.AUTO: NEGATIVE
PH UR STRIP: 5.5 [PH] (ref 5–8)
POTASSIUM SERPL-SCNC: 3.9 MMOL/L (ref 3.5–5.1)
PROT SERPL-MCNC: 7.2 G/DL (ref 6.4–8.2)
PROT UR STRIP-MCNC: NEGATIVE MG/DL
RBC #/AREA URNS HPF: ABNORMAL /HPF (ref 0–5)
SERVICE CMNT-IMP: NORMAL
SERVICE CMNT-IMP: NORMAL
SODIUM SERPL-SCNC: 141 MMOL/L (ref 136–145)
SP GR UR REFRACTOMETRY: 1.01 (ref 1–1.03)
TROPONIN I SERPL-MCNC: <0.05 NG/ML
UA: UC IF INDICATED,UAUC: ABNORMAL
UROBILINOGEN UR QL STRIP.AUTO: 0.2 EU/DL (ref 0.2–1)
WBC URNS QL MICRO: ABNORMAL /HPF (ref 0–4)

## 2021-02-11 PROCEDURE — 81001 URINALYSIS AUTO W/SCOPE: CPT

## 2021-02-11 PROCEDURE — 96374 THER/PROPH/DIAG INJ IV PUSH: CPT

## 2021-02-11 PROCEDURE — 74011250636 HC RX REV CODE- 250/636: Performed by: EMERGENCY MEDICINE

## 2021-02-11 PROCEDURE — 82962 GLUCOSE BLOOD TEST: CPT

## 2021-02-11 PROCEDURE — 74177 CT ABD & PELVIS W/CONTRAST: CPT

## 2021-02-11 PROCEDURE — 74011000636 HC RX REV CODE- 636: Performed by: RADIOLOGY

## 2021-02-11 PROCEDURE — 74011000250 HC RX REV CODE- 250: Performed by: EMERGENCY MEDICINE

## 2021-02-11 PROCEDURE — 87086 URINE CULTURE/COLONY COUNT: CPT

## 2021-02-11 RX ORDER — ACETAMINOPHEN 500 MG
1000 TABLET ORAL
Qty: 30 TAB | Refills: 0 | Status: ON HOLD | OUTPATIENT
Start: 2021-02-11 | End: 2021-02-12

## 2021-02-11 RX ORDER — AMOXICILLIN AND CLAVULANATE POTASSIUM 875; 125 MG/1; MG/1
1 TABLET, FILM COATED ORAL 2 TIMES DAILY
Qty: 20 TAB | Refills: 0 | Status: ON HOLD | OUTPATIENT
Start: 2021-02-11 | End: 2021-02-12

## 2021-02-11 RX ADMIN — CEFTRIAXONE 1 G: 1 INJECTION, POWDER, FOR SOLUTION INTRAMUSCULAR; INTRAVENOUS at 01:57

## 2021-02-11 RX ADMIN — IOPAMIDOL 100 ML: 755 INJECTION, SOLUTION INTRAVENOUS at 00:29

## 2021-02-11 NOTE — ED NOTES
I have reviewed discharge instructions with the patient. The patient verbalized understanding. The patient was able to ambulate to the exit with a steady gait and did not appear to be in any distress. Pt refused covid swab.

## 2021-02-11 NOTE — ED PROVIDER NOTES
The history is provided by the patient. Chills   This is a new problem. The current episode started 1 to 2 hours ago. The problem occurs constantly. The problem has not changed since onset. The maximum temperature noted was 103 - 104 F. The temperature was taken using an oral thermometer. Pertinent negatives include no chest pain, no sleepiness, no diarrhea, no vomiting, no congestion, no headaches, no sore throat, no muscle aches, no cough, no shortness of breath, no mental status change, no neck pain, no rash and no urinary symptoms. She has tried nothing for the symptoms. The treatment provided no relief. Past Medical History:   Diagnosis Date    Abnormal Papanicolaou smear of cervix     Abnormality in fetal heart rate and rhythm complicating labor and delivery 2/3/2021    Asthma     Psychiatric problem     depression       No past surgical history on file. No family history on file.     Social History     Socioeconomic History    Marital status: SINGLE     Spouse name: Not on file    Number of children: Not on file    Years of education: Not on file    Highest education level: Not on file   Occupational History    Not on file   Social Needs    Financial resource strain: Not on file    Food insecurity     Worry: Not on file     Inability: Not on file    Transportation needs     Medical: Not on file     Non-medical: Not on file   Tobacco Use    Smoking status: Not on file   Substance and Sexual Activity    Alcohol use: Not on file    Drug use: Not on file    Sexual activity: Not on file   Lifestyle    Physical activity     Days per week: Not on file     Minutes per session: Not on file    Stress: Not on file   Relationships    Social connections     Talks on phone: Not on file     Gets together: Not on file     Attends [employer] service: Not on file     Active member of club or organization: Not on file     Attends meetings of clubs or organizations: Not on file     Relationship status: Not on file    Intimate partner violence     Fear of current or ex partner: Not on file     Emotionally abused: Not on file     Physically abused: Not on file     Forced sexual activity: Not on file   Other Topics Concern     Service Not Asked    Blood Transfusions Not Asked    Caffeine Concern Not Asked    Occupational Exposure Not Asked    Hobby Hazards Not Asked    Sleep Concern Not Asked    Stress Concern Not Asked    Weight Concern Not Asked    Special Diet Not Asked    Back Care Not Asked    Exercise Not Asked    Bike Helmet Not Asked   [address] Not Asked    Self-Exams Not Asked   Social History Narrative    Not on file         ALLERGIES: Patient has no known allergies. Review of Systems   Constitutional: Positive for chills and fatigue. HENT: Negative for congestion and sore throat. Respiratory: Negative for cough and shortness of breath. Cardiovascular: Negative for chest pain. Gastrointestinal: Positive for abdominal pain. Negative for diarrhea and vomiting. Genitourinary: Positive for vaginal discharge. Musculoskeletal: Negative for neck pain. Skin: Negative for rash. Neurological: Negative for headaches. Vitals:    02/10/21 2053   BP: (!) 141/68   Pulse: 90   Resp: 16   Temp: (!) 103 °F (39.4 °C)   SpO2: 100%            Physical Exam  Vitals signs and nursing note reviewed. Constitutional:       General: She is not in acute distress. Appearance: She is well-developed. She is not diaphoretic. HENT:      Head: Normocephalic and atraumatic. Eyes:      Pupils: Pupils are equal, round, and reactive to light. Neck:      Musculoskeletal: Normal range of motion and neck supple. Cardiovascular:      Rate and Rhythm: Normal rate and regular rhythm. Heart sounds: Normal heart sounds. No murmur. No friction rub. No gallop. Pulmonary:      Effort: Pulmonary effort is normal. No respiratory distress.       Breath sounds: Normal breath sounds. No wheezing. Abdominal:      General: Bowel sounds are normal. There is no distension. Palpations: Abdomen is soft. Tenderness: There is generalized abdominal tenderness. There is no guarding or rebound. Musculoskeletal: Normal range of motion. Skin:     General: Skin is warm. Findings: No rash. Neurological:      Mental Status: She is alert and oriented to person, place, and time. Motor: Tremor present. Psychiatric:         Mood and Affect: Affect is tearful. Behavior: Behavior normal.         Thought Content: Thought content normal.         Judgment: Judgment normal.          MDM     This is a 33-year-old female with past medical history, review of systems, physical exam as above, presenting with complaints of shaking chills. Patient states symptoms began an hour or 2 prior to arrival.  Denies other complaints including fevers, cough, nausea or vomiting. She states she is postop day 7 following , was evaluated by her GYN today. She endorses postop vaginal discharge, states abdominal pain about the same as when she was discharged. Physical exam remarkable for tearful right during obese female, normotensive without tachycardia, noted to be febrile, satting well on room air. Rigors appear to improve with breathing exercises where she is noted to have clear breath sounds to auscultation, regular rate and rhythm without murmurs gallops or rubs, well-healing suprapubic surgical scar with bilateral lower quadrant tenderness. Differential includes viral URI, surgical site infection, sepsis. Plan to initiate sepsis bundle, antipyretics for fever, fluid resuscitation. Will include CT of the abdomen and pelvis. We will reassess, and make a disposition. Procedures    BESIDE SIGN OUT:  11:29 PM  Discussed pt's hx, disposition, and available diagnostic and imaging results with Dr. Chris Chapman at bedside with the patient. Reviewed care plans.  Both providers and patient are in agreement with care plan. Dr. Home Whiting is transferring care of the pt to Dr. Anaid Bowman at this time.

## 2021-02-11 NOTE — ED NOTES
11:16 PM  Change of shift. Care of patient taken over from Dr. Hank Hernandez; H&P reviewed, bedside handoff complete. Awaiting lab and CT result. Progress Note:   Pt has been reexamined by Brittany Guzman MD. Pt is feeling much better. Symptoms have improved. All available results have been reviewed with pt and any available family. Pt understands sx, dx, and tx in ED. Care plan has been outlined and questions have been answered. Pt is ready to go home. Will send home on acute febrile illness, COVID-19 and hypoglycemia instruction. Outpatient referral with PCP as needed. Written by Brittany Guzman MD,12:57 AM    .   .

## 2021-02-11 NOTE — ED NOTES
Bedside and Verbal shift change report given to Austin Hahn [facility] (oncoming nurse) by Idalia Ramos RN (offgoing nurse). Report included the following information SBAR, ED Summary, Intake/Output and MAR.

## 2021-02-11 NOTE — ED NOTES
11:30 PM  Change of shift. Care of patient taken over from Dr. Kaylyn Theodore; H&P reviewed, bedside handoff complete. Awaiting lab/IV fluid and CT scan of the abdomen pelvis results    Progress Note:   Pt has been reexamined by Bishop Everton MD. Pt is feeling much better. Symptoms have improved. All available results have been reviewed with pt and any available family. Pt understands sx, dx, and tx in ED. Care plan has been outlined and questions have been answered. Pt is ready to go home. Will send home on acute febrile illness/UTI and COVID-19 infection instruction. Prescription of Augmentin and Tylenol. Outpatient referral with PCP for reevaluation and further treatment as needed. Written by Bishop Everton MD,1:57 AM    .   .

## 2021-02-11 NOTE — ED TRIAGE NOTES
Triage: per EMS Pt called for weakness and fever. Per EMS pt had a bg of 35 and brought up to 86 using oral methods.

## 2021-02-12 ENCOUNTER — HOSPITAL ENCOUNTER (OUTPATIENT)
Age: 26
Setting detail: OBSERVATION
Discharge: HOME | End: 2021-02-13
Attending: EMERGENCY MEDICINE | Admitting: INTERNAL MEDICINE
Payer: COMMERCIAL

## 2021-02-12 ENCOUNTER — PATIENT OUTREACH (OUTPATIENT)
Dept: CASE MANAGEMENT | Age: 26
End: 2021-02-12

## 2021-02-12 DIAGNOSIS — R78.81 POSITIVE BLOOD CULTURES: Primary | ICD-10-CM

## 2021-02-12 PROBLEM — E66.9 OBESITY: Status: ACTIVE | Noted: 2021-02-02

## 2021-02-12 PROBLEM — Z98.891 H/O CESAREAN SECTION: Status: ACTIVE | Noted: 2021-02-12

## 2021-02-12 LAB
ALBUMIN SERPL-MCNC: 2.7 G/DL (ref 3.5–5)
ALBUMIN/GLOB SERPL: 0.5 {RATIO} (ref 1.1–2.2)
ALP SERPL-CCNC: 140 U/L (ref 45–117)
ALT SERPL-CCNC: 25 U/L (ref 12–78)
ANION GAP SERPL CALC-SCNC: 7 MMOL/L (ref 5–15)
APPEARANCE UR: CLEAR
AST SERPL-CCNC: 25 U/L (ref 15–37)
BACTERIA SPEC CULT: NORMAL
BACTERIA URNS QL MICRO: NEGATIVE /HPF
BASOPHILS # BLD: 0 K/UL (ref 0–0.1)
BASOPHILS NFR BLD: 0 % (ref 0–1)
BILIRUB SERPL-MCNC: 0.3 MG/DL (ref 0.2–1)
BILIRUB UR QL: NEGATIVE
BUN SERPL-MCNC: 6 MG/DL (ref 6–20)
BUN/CREAT SERPL: 6 (ref 12–20)
CALCIUM SERPL-MCNC: 9 MG/DL (ref 8.5–10.1)
CC UR VC: NORMAL
CHLORIDE SERPL-SCNC: 105 MMOL/L (ref 97–108)
CO2 SERPL-SCNC: 28 MMOL/L (ref 21–32)
COLOR UR: ABNORMAL
COMMENT, HOLDF: NORMAL
CREAT SERPL-MCNC: 0.95 MG/DL (ref 0.55–1.02)
DIFFERENTIAL METHOD BLD: ABNORMAL
EOSINOPHIL # BLD: 0.2 K/UL (ref 0–0.4)
EOSINOPHIL NFR BLD: 2 % (ref 0–7)
EPITH CASTS URNS QL MICRO: ABNORMAL /LPF
ERYTHROCYTE [DISTWIDTH] IN BLOOD BY AUTOMATED COUNT: 14.9 % (ref 11.5–14.5)
GLOBULIN SER CALC-MCNC: 5.3 G/DL (ref 2–4)
GLUCOSE SERPL-MCNC: 91 MG/DL (ref 65–100)
GLUCOSE UR STRIP.AUTO-MCNC: NEGATIVE MG/DL
HCT VFR BLD AUTO: 38.4 % (ref 35–47)
HGB BLD-MCNC: 12.4 G/DL (ref 11.5–16)
HGB UR QL STRIP: ABNORMAL
HYALINE CASTS URNS QL MICRO: ABNORMAL /LPF (ref 0–5)
IMM GRANULOCYTES # BLD AUTO: 0.1 K/UL (ref 0–0.04)
IMM GRANULOCYTES NFR BLD AUTO: 1 % (ref 0–0.5)
KETONES UR QL STRIP.AUTO: NEGATIVE MG/DL
LACTATE SERPL-SCNC: 1.1 MMOL/L (ref 0.4–2)
LEUKOCYTE ESTERASE UR QL STRIP.AUTO: ABNORMAL
LIPASE SERPL-CCNC: 48 U/L (ref 73–393)
LYMPHOCYTES # BLD: 1.9 K/UL (ref 0.8–3.5)
LYMPHOCYTES NFR BLD: 21 % (ref 12–49)
MCH RBC QN AUTO: 27.4 PG (ref 26–34)
MCHC RBC AUTO-ENTMCNC: 32.3 G/DL (ref 30–36.5)
MCV RBC AUTO: 85 FL (ref 80–99)
MONOCYTES # BLD: 0.7 K/UL (ref 0–1)
MONOCYTES NFR BLD: 7 % (ref 5–13)
NEUTS SEG # BLD: 6 K/UL (ref 1.8–8)
NEUTS SEG NFR BLD: 69 % (ref 32–75)
NITRITE UR QL STRIP.AUTO: NEGATIVE
NRBC # BLD: 0 K/UL (ref 0–0.01)
NRBC BLD-RTO: 0 PER 100 WBC
PH UR STRIP: 6.5 [PH] (ref 5–8)
PLATELET # BLD AUTO: 307 K/UL (ref 150–400)
PMV BLD AUTO: 10.5 FL (ref 8.9–12.9)
POTASSIUM SERPL-SCNC: 3.9 MMOL/L (ref 3.5–5.1)
PROCALCITONIN SERPL-MCNC: 0.06 NG/ML
PROT SERPL-MCNC: 8 G/DL (ref 6.4–8.2)
PROT UR STRIP-MCNC: NEGATIVE MG/DL
RBC # BLD AUTO: 4.52 M/UL (ref 3.8–5.2)
RBC #/AREA URNS HPF: ABNORMAL /HPF (ref 0–5)
SAMPLES BEING HELD,HOLD: NORMAL
SERVICE CMNT-IMP: NORMAL
SODIUM SERPL-SCNC: 140 MMOL/L (ref 136–145)
SP GR UR REFRACTOMETRY: 1.01 (ref 1–1.03)
UR CULT HOLD, URHOLD: NORMAL
UROBILINOGEN UR QL STRIP.AUTO: 0.2 EU/DL (ref 0.2–1)
WBC # BLD AUTO: 8.8 K/UL (ref 3.6–11)
WBC URNS QL MICRO: ABNORMAL /HPF (ref 0–4)

## 2021-02-12 PROCEDURE — 84145 PROCALCITONIN (PCT): CPT

## 2021-02-12 PROCEDURE — G0378 HOSPITAL OBSERVATION PER HR: HCPCS

## 2021-02-12 PROCEDURE — 81001 URINALYSIS AUTO W/SCOPE: CPT

## 2021-02-12 PROCEDURE — 99218 HC RM OBSERVATION: CPT

## 2021-02-12 PROCEDURE — 87040 BLOOD CULTURE FOR BACTERIA: CPT

## 2021-02-12 PROCEDURE — 36415 COLL VENOUS BLD VENIPUNCTURE: CPT

## 2021-02-12 PROCEDURE — 83605 ASSAY OF LACTIC ACID: CPT

## 2021-02-12 PROCEDURE — 96374 THER/PROPH/DIAG INJ IV PUSH: CPT

## 2021-02-12 PROCEDURE — 65270000029 HC RM PRIVATE

## 2021-02-12 PROCEDURE — 99282 EMERGENCY DEPT VISIT SF MDM: CPT

## 2021-02-12 PROCEDURE — 74011250637 HC RX REV CODE- 250/637: Performed by: INTERNAL MEDICINE

## 2021-02-12 PROCEDURE — 74011000250 HC RX REV CODE- 250: Performed by: INTERNAL MEDICINE

## 2021-02-12 PROCEDURE — 87086 URINE CULTURE/COLONY COUNT: CPT

## 2021-02-12 PROCEDURE — 80053 COMPREHEN METABOLIC PANEL: CPT

## 2021-02-12 PROCEDURE — 74011250636 HC RX REV CODE- 250/636: Performed by: INTERNAL MEDICINE

## 2021-02-12 PROCEDURE — 83690 ASSAY OF LIPASE: CPT

## 2021-02-12 PROCEDURE — 85025 COMPLETE CBC W/AUTO DIFF WBC: CPT

## 2021-02-12 RX ORDER — ZOLPIDEM TARTRATE 5 MG/1
5 TABLET ORAL
Status: DISCONTINUED | OUTPATIENT
Start: 2021-02-12 | End: 2021-02-13 | Stop reason: HOSPADM

## 2021-02-12 RX ORDER — SODIUM CHLORIDE 0.9 % (FLUSH) 0.9 %
5-40 SYRINGE (ML) INJECTION EVERY 8 HOURS
Status: DISCONTINUED | OUTPATIENT
Start: 2021-02-12 | End: 2021-02-13 | Stop reason: HOSPADM

## 2021-02-12 RX ORDER — ONDANSETRON 2 MG/ML
4 INJECTION INTRAMUSCULAR; INTRAVENOUS
Status: DISCONTINUED | OUTPATIENT
Start: 2021-02-12 | End: 2021-02-13 | Stop reason: HOSPADM

## 2021-02-12 RX ORDER — POLYETHYLENE GLYCOL 3350 17 G/17G
17 POWDER, FOR SOLUTION ORAL 2 TIMES DAILY
Status: DISCONTINUED | OUTPATIENT
Start: 2021-02-12 | End: 2021-02-13 | Stop reason: HOSPADM

## 2021-02-12 RX ORDER — ALPRAZOLAM 0.5 MG/1
0.5 TABLET ORAL
Status: DISCONTINUED | OUTPATIENT
Start: 2021-02-12 | End: 2021-02-12

## 2021-02-12 RX ORDER — PROMETHAZINE HYDROCHLORIDE 25 MG/1
12.5 TABLET ORAL
Status: DISCONTINUED | OUTPATIENT
Start: 2021-02-12 | End: 2021-02-13 | Stop reason: HOSPADM

## 2021-02-12 RX ORDER — SODIUM CHLORIDE 0.9 % (FLUSH) 0.9 %
5-40 SYRINGE (ML) INJECTION AS NEEDED
Status: DISCONTINUED | OUTPATIENT
Start: 2021-02-12 | End: 2021-02-13 | Stop reason: HOSPADM

## 2021-02-12 RX ORDER — POLYETHYLENE GLYCOL 3350 17 G/17G
17 POWDER, FOR SOLUTION ORAL DAILY PRN
Status: DISCONTINUED | OUTPATIENT
Start: 2021-02-12 | End: 2021-02-13 | Stop reason: HOSPADM

## 2021-02-12 RX ORDER — ACETAMINOPHEN 650 MG/1
650 SUPPOSITORY RECTAL
Status: DISCONTINUED | OUTPATIENT
Start: 2021-02-12 | End: 2021-02-13 | Stop reason: HOSPADM

## 2021-02-12 RX ORDER — AMOXICILLIN 250 MG
1 CAPSULE ORAL DAILY
Status: DISCONTINUED | OUTPATIENT
Start: 2021-02-13 | End: 2021-02-13 | Stop reason: HOSPADM

## 2021-02-12 RX ORDER — ACETAMINOPHEN 325 MG/1
650 TABLET ORAL
Status: DISCONTINUED | OUTPATIENT
Start: 2021-02-12 | End: 2021-02-13 | Stop reason: HOSPADM

## 2021-02-12 RX ADMIN — ACETAMINOPHEN 650 MG: 325 TABLET ORAL at 18:31

## 2021-02-12 RX ADMIN — Medication 10 ML: at 17:10

## 2021-02-12 RX ADMIN — CEFEPIME HYDROCHLORIDE 2 G: 2 INJECTION, POWDER, FOR SOLUTION INTRAVENOUS at 17:09

## 2021-02-12 RX ADMIN — Medication 10 ML: at 21:26

## 2021-02-12 RX ADMIN — ZOLPIDEM TARTRATE 5 MG: 5 TABLET ORAL at 21:26

## 2021-02-12 NOTE — ACP (ADVANCE CARE PLANNING)
Advance Care Planning:   Does patient have an Advance Directive: currently not on file; education provided     Patient verified healthcare decision makers as correctly listed in chart: Shakir Carlos (boyfriend) [phone]  John Johnson RN  Ambulatory Care Manager

## 2021-02-12 NOTE — ED PROVIDER NOTES
This is a 61-year-old female who presents ambulatory to the emergency room stating she was called back secondary to abnormal lab results. Patient states she was in the emergency room on February 10 with a fever of 103 along with shaking and chills. She is post op day # 9 from a csection. At the time in her emergency room visit a code sepsis was initiated and blood cultures were drawn. Blood cultures came back with gram-negative rods in both bottles. She was called to return to the hospital for evaluation. She was sent home with antibiotics but did not get them filled. Denies any worsening symptoms, denies any chest pain, shortness of breath, dizziness, nausea or vomiting, fever or chills. States she feels improved since her emergency room visit. States she incidentally has a headache now. There are no further complaints at this time. OF NOTE:  Patient is currently breast feeding. Paul Arreguin MD  Past Medical History:  No date: Abnormal Papanicolaou smear of cervix  2/3/2021: Abnormality in fetal heart rate and rhythm complicating   labor and delivery  No date: Asthma  No date: Psychiatric problem      Comment:  depression  No past surgical history on file. Past Medical History:   Diagnosis Date    Abnormal Papanicolaou smear of cervix     Abnormality in fetal heart rate and rhythm complicating labor and delivery 2/3/2021    Asthma     Psychiatric problem     depression       No past surgical history on file. No family history on file.     Social History     Socioeconomic History    Marital status: SINGLE     Spouse name: Not on file    Number of children: Not on file    Years of education: Not on file    Highest education level: Not on file   Occupational History    Not on file   Social Needs    Financial resource strain: Not on file    Food insecurity     Worry: Not on file     Inability: Not on file    Transportation needs     Medical: Not on file     Non-medical: Not on file   Tobacco Use    Smoking status: Not on file   Substance and Sexual Activity    Alcohol use: Not on file    Drug use: Not on file    Sexual activity: Not on file   Lifestyle    Physical activity     Days per week: Not on file     Minutes per session: Not on file    Stress: Not on file   Relationships    Social connections     Talks on phone: Not on file     Gets together: Not on file     Attends Taoism service: Not on file     Active member of club or organization: Not on file     Attends meetings of clubs or organizations: Not on file     Relationship status: Not on file    Intimate partner violence     Fear of current or ex partner: Not on file     Emotionally abused: Not on file     Physically abused: Not on file     Forced sexual activity: Not on file   Other Topics Concern     Service Not Asked    Blood Transfusions Not Asked    Caffeine Concern Not Asked    Occupational Exposure Not Asked   Vickki Kelch Hazards Not Asked    Sleep Concern Not Asked    Stress Concern Not Asked    Weight Concern Not Asked    Special Diet Not Asked    Back Care Not Asked    Exercise Not Asked    Bike Helmet Not Asked   [address] Not Asked    Self-Exams Not Asked   Social History Narrative    Not on file         ALLERGIES: Patient has no known allergies. Review of Systems   Constitutional: Negative for appetite change, chills, diaphoresis, fatigue and fever. HENT: Negative for congestion, ear discharge, ear pain, sinus pressure, sinus pain, sore throat and trouble swallowing. Eyes: Negative for photophobia, pain, redness and visual disturbance. Respiratory: Negative for chest tightness, shortness of breath and wheezing. Cardiovascular: Negative for chest pain and palpitations. Gastrointestinal: Negative for abdominal distention, abdominal pain, nausea and vomiting. Endocrine: Negative. Genitourinary: Negative for difficulty urinating, flank pain, frequency and urgency. Musculoskeletal: Negative for back pain, neck pain and neck stiffness. Skin: Negative for color change, pallor, rash and wound. Allergic/Immunologic: Negative. Neurological: Positive for headaches. Negative for dizziness, speech difficulty and weakness. Hematological: Does not bruise/bleed easily. Psychiatric/Behavioral: Negative for behavioral problems. The patient is not nervous/anxious. Vitals:    02/12/21 1440   BP: 121/85   Pulse: 89   Resp: 18   Temp: 98.5 °F (36.9 °C)   SpO2: 100%   Weight: 140.2 kg (309 lb 1.4 oz)   Height: 5' 4\" (1.626 m)            Physical Exam  Vitals signs and nursing note reviewed. Constitutional:       General: She is not in acute distress. Appearance: Normal appearance. She is well-developed. She is not ill-appearing. HENT:      Head: Normocephalic and atraumatic. Right Ear: External ear normal.      Left Ear: External ear normal.      Nose: Nose normal.      Mouth/Throat:      Mouth: Mucous membranes are moist.   Eyes:      General:         Right eye: No discharge. Left eye: No discharge. Conjunctiva/sclera: Conjunctivae normal.      Pupils: Pupils are equal, round, and reactive to light. Neck:      Musculoskeletal: Normal range of motion and neck supple. Vascular: No JVD. Trachea: No tracheal deviation. Cardiovascular:      Rate and Rhythm: Normal rate and regular rhythm. Pulses: Normal pulses. Heart sounds: Normal heart sounds. No murmur. No gallop. Pulmonary:      Effort: Pulmonary effort is normal. No respiratory distress. Breath sounds: Normal breath sounds. No wheezing or rales. Chest:      Chest wall: No tenderness. Abdominal:      General: Bowel sounds are normal. There is no distension. Palpations: Abdomen is soft. Tenderness: There is no abdominal tenderness. There is no guarding or rebound. Genitourinary:     Comments: Negative    Musculoskeletal: Normal range of motion. General: No tenderness. Skin:     General: Skin is warm and dry. Capillary Refill: Capillary refill takes less than 2 seconds. Coloration: Skin is not pale. Findings: No erythema or rash. Neurological:      General: No focal deficit present. Mental Status: She is alert and oriented to person, place, and time. Motor: No weakness. Coordination: Coordination normal.   Psychiatric:         Mood and Affect: Mood normal.         Behavior: Behavior normal.         Thought Content: Thought content normal.         Judgment: Judgment normal.          MDM  Number of Diagnoses or Management Options  Positive blood cultures: new and requires workup  Diagnosis management comments: Differential diagnosis includes sepsis, migraine, positive blood cultures and others. Plan is to admit to the hospitalist service, IV antibiotics. After discussion, patient is in agreement with plan of care. Discussed with Dr. Matthew Thornton who is in agreement with plan of care. Of note, patient is currently breast-feeding. Amount and/or Complexity of Data Reviewed  Clinical lab tests: ordered and reviewed         Labs Reviewed   URINE CULTURE HOLD SAMPLE   CULTURE, BLOOD, PAIRED   CBC WITH AUTOMATED DIFF   METABOLIC PANEL, COMPREHENSIVE   LIPASE   PROCALCITONIN   URINALYSIS W/MICROSCOPIC   LACTIC ACID   SAMPLES BEING HELD     No results found. Perfect Serve Consult for Admission  3:58 PM    ED Room Number: V01/V01  Patient Name and age:  Melissa Pelletier 23 y.o.  female  Working Diagnosis:   1.  Positive blood cultures        COVID-19 Suspicion:  no  Sepsis present:  no  Reassessment needed: no  Code Status:  Full Code  Readmission: no  Isolation Requirements:  no  Recommended Level of Care:  med/surg  Department:[facility] ED - [phone]  Other:  Gram negative rods in two bottles    Procedures

## 2021-02-12 NOTE — PROGRESS NOTES
Spoke with patient, discussed both bottles growing gram-neg rods, she was in the ED for a fever and post-partum from  delivery. She did not fill her Rx Augmentin. She states feeling improved today but due to positive blood cultures I strongly recommended re-evaluation in the ED. She understands an agrees to go back to the ED.

## 2021-02-12 NOTE — PROGRESS NOTES
Patient contacted regarding recent discharge and COVID-19 risk. Discussed COVID-19 related testing which was not done at this time. Ambulatory Care Manager contacted the patient by telephone to perform post discharge assessment. Verified name and  with patient as identifiers. Outreach made within 2 business days of discharge: Yes    Patient has following risk factors of: ED visit 2/10/21. ACM reviewed discharge instructions, medical action plan and red flags related to discharge diagnosis. Reviewed and educated them on any new and changed medications related to discharge diagnosis. Advance Care Planning:   Does patient have an Advance Directive: currently not on file; education provided     Patient verified healthcare decision makers as correctly listed in chart: Cuauhtemoc Alvarez (boyfriend) [phone]    Education provided regarding infection prevention, and signs and symptoms of COVID-19 and when to seek medical attention with patient who verbalized understanding. Discussed exposure protocols and quarantine from [address] you at higher risk for severe illness  and given an opportunity for questions and concerns. The patient was supplied the [place] hotline [phone] and [facility] hotline [phone]. ACM recommended patient follow up with PCP and provided ACM contact information for future reference. From CDC: Are you at higher risk for severe illness?  Wash your hands often and avoid touching eyes, nose and mouth.  Avoid close contact (6 feet, which is about two arm lengths) with people who are sick.  Put distance between yourself and other people if COVID-19 is spreading in your community.  Clean and disinfect frequently touched surfaces.  Avoid all cruise travel and non-essential air travel.  Call your healthcare professional if you have concerns about COVID-19 and your underlying condition or if you are sick.     For more information on steps you can take to protect yourself, see CDC's How to Protect Yourself      Patient/family/caregiver given information for [employer] and agrees to enroll no    Plan for follow-up call in 7-14 days pending account activation by patient, based on severity of symptoms and risk factors.     Isaac Motta RN  Ambulatory Care Manager

## 2021-02-12 NOTE — Clinical Note
Status[de-identified] INPATIENT [101]   Type of Bed: Medical [8]   Inpatient Hospitalization Certified Necessary for the Following Reasons: 3. Patient receiving treatment that can only be provided in an inpatient setting (further clarification in H&P documentation)   Admitting Diagnosis: Bacteremia [790. 7. ICD-9-CM]   Admitting Physician: Johann Spatz   Attending Physician: Johann Spatz   Estimated Length of Stay: 2 Midnights   Discharge Plan[de-identified] Home with Office Follow-up

## 2021-02-12 NOTE — ED TRIAGE NOTES
Patient was discharged a day ago and got a phone call to come back for bacteria in her blood.      Patient also adds she has a migraine

## 2021-02-12 NOTE — PROGRESS NOTES
Admission Medication Reconciliation:     Information obtained from:    Patient via interview in [address] [place] [address]:  YES    Comments/Recommendations: The patient is lactating. Patient able to confirm name, , allergies, and preferred pharmacy  Updated PTA medication list  On  the patient was prescribed Augmentin. She did not  this prescription prior to admission on . The patient was prescribed Percocet and Motrin post  on . She stopped taking both medications on two days ago due to anxiety. ¹[employer] pharmacy benefit data reflects medications filled and processed through the patient's insurance, however   this data does NOT capture whether the medication was picked up or is currently being taken by the patient. Prior to Admission Medications   Prescriptions Last Dose Informant Taking?   prenatal 10/FBJG fum/folic/dha (PRENATAL-1 PO) 2021 at Unknown time  Yes   Sig: Take 1 Tab by mouth daily. Facility-Administered Medications: None         Please contact the main inpatient pharmacy with any questions or concerns at [phone] and we will direct you to the clinical pharmacist covering this patient's care while in-house.    Nick Harris, WinnieD, BCPS

## 2021-02-12 NOTE — ED NOTES
TRANSFER - OUT REPORT:    Verbal report given to Yojana(name) on Leanne Bamberger  being transferred to [facility](unit) for routine progression of care       Report consisted of patients Situation, Background, Assessment and   Recommendations(SBAR). Information from the following report(s) SBAR, ED Summary and MAR was reviewed with the receiving nurse. Lines:   Peripheral IV 02/12/21 Right Antecubital (Active)   Site Assessment Clean, dry, & intact 02/12/21 1535   Phlebitis Assessment 0 02/12/21 1535   Infiltration Assessment 0 02/12/21 1535   Dressing Status Clean, dry, & intact; New 02/12/21 1535   Dressing Type Transparent 02/12/21 1535   Hub Color/Line Status Pink;Flushed;Patent 02/12/21 1535   Action Taken Blood drawn 02/12/21 1535        Opportunity for questions and clarification was provided.       Patient transported with:  Transport

## 2021-02-12 NOTE — H&P
[address], [place] [address]  [phone]    Admission History and Physical      NAME:  Ranjan Hsu   :   1995   MRN:  [id number]     PCP:  Teodoro Vega MD     Date of service:  2021         Subjective:     CHIEF COMPLAINT: Recent fever. HISTORY OF PRESENT ILLNESS:     Ms. Ximena Sanchez is a 23 y.o.  female who is admitted with bacteremia. Ms. Ximena Sanchez with past medical history of obesity, recent childbirth with  presented to ER after she was called for bacteremia.  2 days ago, patient was seen in ER due to fever of 103 with chills. In ER on 2/10/2021, patient had extensive evaluation including CT scan of the abdomen and pelvis, which was unremarkable for acute finding after which patient was sent home. Today, her blood culture showed GNR and was called for admission and IV antibiotic treatment. Patient denies cough, urinary symptoms, abdominal pain, diarrhea, no discharge from  site. She has not checked her temperature recently and could not tell if she had a fever. Past Medical History:   Diagnosis Date    Abnormal Papanicolaou smear of cervix     Abnormality in fetal heart rate and rhythm complicating labor and delivery 2/3/2021    Asthma     Psychiatric problem     depression        No past surgical history on file. Social History     Tobacco Use    Smoking status: Not on file   Substance Use Topics    Alcohol use: Not on file        No family history on file. No Known Allergies     Prior to Admission medications    Medication Sig Start Date End Date Taking? Authorizing Provider   amoxicillin-clavulanate (Augmentin) 875-125 mg per tablet Take 1 Tab by mouth two (2) times a day. 21   Lefty Mcnulty MD   acetaminophen (TYLENOL) 500 mg tablet Take 2 Tabs by mouth every six (6) hours as needed for Pain.  21   Lefty Mcnulty MD   prenatal 20/PFXI fum/folic/dha (PRENATAL-1 PO) Take  by mouth. Provider, Historical         Review of Systems:  (bold if positive, if negative)    Gen:  Eyes:  ENT:  CVS:  Pulm:  GI:  :  MS:  Skin:  Psych:  Endo:  Hem:  Renal:  Neuro:            Objective:      VITALS:    Vital signs reviewed; most recent are:    Visit Vitals  /85 (BP 1 Location: Right lower arm)   Pulse 89   Temp 98.5 °F (36.9 °C)   Resp 18   Ht 5' 4\" (1.626 m)   Wt 140.2 kg (309 lb 1.4 oz)   SpO2 100%   BMI 53.05 kg/m²     SpO2 Readings from Last 6 Encounters:   02/12/21 100%   02/11/21 100%   02/03/21 99%        No intake or output data in the 24 hours ending 02/12/21 1633         Exam:     Physical Exam:    Gen:  Well-developed, well-nourished, in no acute distress  HEENT:  Pink conjunctivae, PERRL, hearing intact to voice, moist mucous membranes  Neck:  Supple, without masses, thyroid non-tender  Resp:  No accessory muscle use, clear breath sounds without wheezes rales or rhonchi  Card:  No murmurs, normal S1, S2 without thrills, bruits or peripheral edema  Abd:  Soft, non-tender, non-distended, normoactive bowel sounds are present, no palpable organomegaly and no detectable hernias  Lymph:  No cervical or inguinal adenopathy  Musc:  No cyanosis or clubbing  Skin:  No rashes or ulcers, skin turgor is good  Neuro:  Cranial nerves are grossly intact, no focal motor weakness, follows commands appropriately  Psych:  Good insight, oriented to person, place and time, alert       Labs:    Recent Labs     02/12/21  1542   WBC 8.8   HGB 12.4   HCT 38.4        Recent Labs     02/12/21  1542      K 3.9      CO2 28   GLU 91   BUN 6   CREA 0.95   CA 9.0   ALB 2.7*   TBILI 0.3   ALT 25     Lab Results   Component Value Date/Time    Glucose (POC) 81 02/11/2021 02:55 AM    Glucose (POC) 65 02/11/2021 02:01 AM     No results for input(s): PH, PCO2, PO2, HCO3, FIO2 in the last 72 hours. No results for input(s): INR, INREXT in the last 72 hours.     Telemetry reviewed:         Assessment/Plan:    1. Bacteremia: GNR. Unclear source of infection. CT of abdomen and pelvis is unremarkable for acute finding, UA is unremarkable,  incision site is clean. Admit to medical.  Start cefepime empirically. Repeat blood culture and consult ID    2. Postpartum/ delivery. Patient is breast-feeding. Will consult obstetrics    3. Morbid obesity (2021). Would benefit from weight loss    4. Constipation.   Bowel regimen    Previous medical records reviewed     Risk of deterioration: medium      Total time spent with patient: 48 [address] [facility] discussed with: Patient, Nursing Staff and >50% of time spent in counseling and coordination of care    Discussed:  Care Plan    Prophylaxis:  SCD's    Probable Disposition:  Home w/Family           ___________________________________________________    Attending Physician: Owen Hook MD

## 2021-02-13 VITALS
OXYGEN SATURATION: 99 % | RESPIRATION RATE: 16 BRPM | HEIGHT: 64 IN | TEMPERATURE: 97.9 F | WEIGHT: 293 LBS | HEART RATE: 76 BPM | BODY MASS INDEX: 50.02 KG/M2 | SYSTOLIC BLOOD PRESSURE: 130 MMHG | DIASTOLIC BLOOD PRESSURE: 83 MMHG

## 2021-02-13 LAB
ALBUMIN SERPL-MCNC: 2.2 G/DL (ref 3.5–5)
ALBUMIN/GLOB SERPL: 0.5 {RATIO} (ref 1.1–2.2)
ALP SERPL-CCNC: 116 U/L (ref 45–117)
ALT SERPL-CCNC: 20 U/L (ref 12–78)
ANION GAP SERPL CALC-SCNC: 6 MMOL/L (ref 5–15)
AST SERPL-CCNC: 15 U/L (ref 15–37)
ATRIAL RATE: 84 BPM
BACTERIA SPEC CULT: ABNORMAL
BACTERIA SPEC CULT: ABNORMAL
BILIRUB SERPL-MCNC: 0.2 MG/DL (ref 0.2–1)
BUN SERPL-MCNC: 7 MG/DL (ref 6–20)
BUN/CREAT SERPL: 10 (ref 12–20)
CALCIUM SERPL-MCNC: 8.2 MG/DL (ref 8.5–10.1)
CALCULATED P AXIS, ECG09: 66 DEGREES
CALCULATED R AXIS, ECG10: 25 DEGREES
CALCULATED T AXIS, ECG11: 34 DEGREES
CC UR VC: ABNORMAL
CHLORIDE SERPL-SCNC: 109 MMOL/L (ref 97–108)
CO2 SERPL-SCNC: 26 MMOL/L (ref 21–32)
CREAT SERPL-MCNC: 0.72 MG/DL (ref 0.55–1.02)
DIAGNOSIS, 93000: NORMAL
ERYTHROCYTE [DISTWIDTH] IN BLOOD BY AUTOMATED COUNT: 15.1 % (ref 11.5–14.5)
GLOBULIN SER CALC-MCNC: 4.2 G/DL (ref 2–4)
GLUCOSE SERPL-MCNC: 91 MG/DL (ref 65–100)
HCT VFR BLD AUTO: 34.5 % (ref 35–47)
HGB BLD-MCNC: 11.1 G/DL (ref 11.5–16)
MCH RBC QN AUTO: 27.5 PG (ref 26–34)
MCHC RBC AUTO-ENTMCNC: 32.2 G/DL (ref 30–36.5)
MCV RBC AUTO: 85.6 FL (ref 80–99)
NRBC # BLD: 0 K/UL (ref 0–0.01)
NRBC BLD-RTO: 0 PER 100 WBC
P-R INTERVAL, ECG05: 164 MS
PLATELET # BLD AUTO: 288 K/UL (ref 150–400)
PMV BLD AUTO: 9.8 FL (ref 8.9–12.9)
POTASSIUM SERPL-SCNC: 3.9 MMOL/L (ref 3.5–5.1)
PROT SERPL-MCNC: 6.4 G/DL (ref 6.4–8.2)
Q-T INTERVAL, ECG07: 388 MS
QRS DURATION, ECG06: 84 MS
QTC CALCULATION (BEZET), ECG08: 458 MS
RBC # BLD AUTO: 4.03 M/UL (ref 3.8–5.2)
SERVICE CMNT-IMP: ABNORMAL
SERVICE CMNT-IMP: ABNORMAL
SODIUM SERPL-SCNC: 141 MMOL/L (ref 136–145)
VENTRICULAR RATE, ECG03: 84 BPM
WBC # BLD AUTO: 7.5 K/UL (ref 3.6–11)

## 2021-02-13 PROCEDURE — 80053 COMPREHEN METABOLIC PANEL: CPT

## 2021-02-13 PROCEDURE — 85027 COMPLETE CBC AUTOMATED: CPT

## 2021-02-13 PROCEDURE — 74011250637 HC RX REV CODE- 250/637: Performed by: INTERNAL MEDICINE

## 2021-02-13 PROCEDURE — 99218 HC RM OBSERVATION: CPT

## 2021-02-13 PROCEDURE — G0378 HOSPITAL OBSERVATION PER HR: HCPCS

## 2021-02-13 PROCEDURE — 74011000250 HC RX REV CODE- 250: Performed by: INTERNAL MEDICINE

## 2021-02-13 PROCEDURE — 74011250636 HC RX REV CODE- 250/636: Performed by: INTERNAL MEDICINE

## 2021-02-13 PROCEDURE — 36415 COLL VENOUS BLD VENIPUNCTURE: CPT

## 2021-02-13 RX ORDER — CEPHALEXIN 500 MG/1
500 CAPSULE ORAL 4 TIMES DAILY
Qty: 28 CAP | Refills: 0 | Status: SHIPPED | OUTPATIENT
Start: 2021-02-13 | End: 2021-02-20

## 2021-02-13 RX ORDER — AMOXICILLIN AND CLAVULANATE POTASSIUM 875; 125 MG/1; MG/1
1 TABLET, FILM COATED ORAL EVERY 12 HOURS
Qty: 20 TAB | Refills: 0 | Status: SHIPPED | OUTPATIENT
Start: 2021-02-13 | End: 2021-02-13

## 2021-02-13 RX ADMIN — CEFEPIME HYDROCHLORIDE 2 G: 2 INJECTION, POWDER, FOR SOLUTION INTRAVENOUS at 05:30

## 2021-02-13 RX ADMIN — POLYETHYLENE GLYCOL 3350 17 G: 17 POWDER, FOR SOLUTION ORAL at 09:28

## 2021-02-13 RX ADMIN — Medication 10 ML: at 05:47

## 2021-02-13 RX ADMIN — ACETAMINOPHEN 650 MG: 325 TABLET ORAL at 10:16

## 2021-02-13 RX ADMIN — DOCUSATE SODIUM 50MG AND SENNOSIDES 8.6MG 1 TABLET: 8.6; 5 TABLET, FILM COATED ORAL at 09:28

## 2021-02-13 NOTE — PROGRESS NOTES
Bedside and Verbal shift change report given to [address] [place] [address] Drive (oncoming nurse) by Miranda Lam RN (offgoing nurse). Report included the following information SBAR, Kardex, MAR, Accordion and Recent Results.

## 2021-02-13 NOTE — DISCHARGE INSTRUCTIONS
HOSPITALIST DISCHARGE INSTRUCTIONS  NAME: Dave Burrows   :  1995   MRN:  [id number]     Date/Time:  2021 11:08 AM    ADMIT DATE: 2021     DISCHARGE DATE: 2021     ADMITTING DIAGNOSIS:  Bloodstream infection     DISCHARGE DIAGNOSIS:  As above     MEDICATIONS:     · It is important that you take the medication exactly as they are prescribed. · Keep your medication in the bottles provided by the pharmacist and keep a list of the medication names, dosages, and times to be taken in your wallet. · Do not take other medications without consulting your doctor. Pain Management: per above medications    What to do at Home    Recommended diet:  Regular Diet    Recommended activity: Activity as per OB/GYN    If you experience any of the following symptoms then please call your primary care physician or return to the emergency room if you cannot get hold of your doctor:  Fever, chills, nausea, vomiting, diarrhea, change in mentation, falling, bleeding, shortness of breath, chest pain     Follow Up:  Dr. Katherine Pereyra MD  you are to call and set up an appointment to see them in 1-2 weeks. Information obtained by :  I understand that if any problems occur once I am at home I am to contact my physician. I understand and acknowledge receipt of the instructions indicated above.                                                                                                                                            Physician's or R.N.'s Signature                                                                  Date/Time                                                                                                                                              Patient or Representative Signature                                                          Date/Time

## 2021-02-13 NOTE — PROGRESS NOTES
Bedside and Verbal shift change report given to Ju Blunt (oncoming nurse) by Cassie Chacko (offgoing nurse). Report included the following information SBAR, Kardex, Procedure Summary, Intake/Output, MAR, Accordion, Recent Results and Med Rec Status.

## 2021-02-15 NOTE — PROGRESS NOTES
Physician Progress Note      PATIENT:               Yandel Ordaz  CSN #:                  [id number]  :                       1995  ADMIT DATE:       2021 2:45 PM  [address] [place] DATE:        2021 2:34 PM  RESPONDING  PROVIDER #:        Nasir Hidalgo MD          QUERY TEXT:    Dear Hospitalist Team,  Pt admitted with Bacteremia s/p . Pt noted to have positive UC from admission on . If possible, please document in the progress notes and discharge summary if you are evaluating and/or treating any of the following: The medical record reflects the following:    Risk Factors: 23 Yr F admitted with Bacteremia; recent  9 days prior    Clinical Indicators: Patient arrived to the ED after previous admission revealed positive blood cultures after discharge with E.coli Bacteremia. Patient admitted for IV abx. Work up in the ED also revealed a UA with blood: large and leukocyte esterase: small with a UC from  positive for ENTEROCOCCUS SPECIES. Patient received maxipime 2G IV Q 12hrs x's 2 doses prior to discharge. Treatment: CBC/BMP, UA/UC, maxipime 2G IV Q 12hrs and frequent monitoring/vital signs. Thank you,  Jordi Oakes RN, [facility]  [phone]  Options provided:  -- Urinary Tract Infection (UTI)  -- Bacteriuria  -- Other - I will add my own diagnosis  -- Disagree - Not applicable / Not valid  -- Disagree - Clinically unable to determine / Unknown  -- Refer to Clinical Documentation Reviewer    PROVIDER RESPONSE TEXT:    This patient has bacteriuria. Query created by:  Shanell Tsai on 2/15/2021 1:32 PM      Electronically signed by:  Nasir Hidalgo MD 2/15/2021 3:26 PM

## 2021-02-17 LAB
BACTERIA SPEC CULT: NORMAL
SERVICE CMNT-IMP: NORMAL

## 2021-02-17 NOTE — DISCHARGE SUMMARY
Physician Discharge Summary     Patient ID:  Shelbi Hayward  [id number]  23 y.o.  1995    Admit date: 2021    Discharge date and time: 2021    Admission Diagnoses: Bacteremia [R78.81]    Discharge Diagnoses/Hospital Course   Bacteremia: on cultures PTA. Admission blood cultures interestingly negative and pt reports not taking antibiotics between these cultures. ?source. UA on admission did not have E.coli growth. CT ab/pelvis without acute process. Improved on antibiotics. Will d/c on PO keflex to complete course      Postpartum/ delivery: incision appears healed without e/o dehiscence. Reviewed case with OB who is in agreement with aforementioned plan.      Morbid obesity (2021).   Would benefit from weight loss    PCP: Cecil Patel MD     Consults: OB    Discharge Exam:  Visit Vitals  /83 (BP 1 Location: Right lower arm, BP Patient Position: At rest)   Pulse 76   Temp 97.9 °F (36.6 °C)   Resp 16   Ht 5' 4\" (1.626 m)   Wt 140.2 kg (309 lb 1.4 oz)   SpO2 99%   Breastfeeding Yes   BMI 53.05 kg/m²     Gen:  Well-developed, well-nourished, in no acute distress  HEENT:  Pink conjunctivae, PERRL, hearing intact to voice, moist mucous membranes  Neck:  Supple, without masses, thyroid non-tender  Resp:  No accessory muscle use, clear breath sounds without wheezes rales or rhonchi  Card:  No murmurs, normal S1, S2 without thrills, bruits or peripheral edema  Abd:  Soft, non-tender, non-distended, normoactive bowel sounds are present, no palpable organomegaly and no detectable hernias  Lymph:  No cervical or inguinal adenopathy  Musc:  No cyanosis or clubbing  Skin:  No rashes or ulcers, skin turgor is good  Neuro:  Cranial nerves are grossly intact, no focal motor weakness, follows commands appropriately  Psych:  Good insight, oriented to person, place and time, alert  Obese   C section incision appears healed    Disposition: home    Patient Instructions:   Discharge Medication List as of 2/13/2021  2:12 PM      START taking these medications    Details   cephALEXin (Keflex) 500 mg capsule Take 1 Cap by mouth four (4) times daily for 7 days. , Print, Disp-28 Cap, R-0         CONTINUE these medications which have NOT CHANGED    Details   prenatal 61/DMPR fum/folic/dha (PRENATAL-1 PO) Take 1 Tab by mouth daily. , Historical Med           Activity: See surgical instructions  Diet: Resume previous diet  Wound Care: As directed    Follow-up with Elfreda Runner, MD   Follow-up Information     Follow up With Specialties Details Why Sinai Vale MD Obstetrics & Gynecology, Gynecology, Obstetrics   [address] [facility] [address] [address]  [phone]          Approximate time spent in patient care on day of discharge: 35 minutes     Signed:  Everardo Quintanilla MD  2/16/2021  7:56 PM

## 2021-02-26 ENCOUNTER — PATIENT OUTREACH (OUTPATIENT)
Dept: CASE MANAGEMENT | Age: 26
End: 2021-02-26

## 2021-02-26 NOTE — PROGRESS NOTES
Patient resolved from [address] Transitions episode on 2/26/21  Did not discuss COVID-19 related testing which was not done at this time. Patient/family has been provided the following resources and education related to COVID-19:                         Signs, symptoms and red flags related to COVID-19            CDC exposure and quarantine guidelines            [facility] exposure contact - [phone]            Contact for their local Department of Health                 Patient currently reports that the following symptoms have improved:  no new symptoms and no worsening symptoms. Patient was admitted to [facility] 2/12/21 - 2/13/21 for Bacteremia, treated with antibiotics and discharged home. Patient reports she is feeling much better and has a follow up appointment with her OB/GYN doctor in 3 weeks. She has scheduled an appointment to establish care with a new PCP in July. No further outreach scheduled with this ACM. Episode of Care resolved. Patient has this ACM contact information if future needs arise.   Juan R Wyatt RN  Ambulatory Care Manager

## 2021-05-31 ENCOUNTER — HOSPITAL ENCOUNTER (EMERGENCY)
Age: 26
Discharge: HOME | End: 2021-06-01
Attending: EMERGENCY MEDICINE
Payer: COMMERCIAL

## 2021-05-31 DIAGNOSIS — T78.40XD ALLERGIC REACTION, SUBSEQUENT ENCOUNTER: Primary | ICD-10-CM

## 2021-05-31 DIAGNOSIS — R06.02 SOB (SHORTNESS OF BREATH): ICD-10-CM

## 2021-05-31 DIAGNOSIS — R07.9 CHEST PAIN, UNSPECIFIED TYPE: ICD-10-CM

## 2021-05-31 DIAGNOSIS — L50.9 URTICARIA: ICD-10-CM

## 2021-05-31 PROCEDURE — 84484 ASSAY OF TROPONIN QUANT: CPT

## 2021-05-31 PROCEDURE — 93005 ELECTROCARDIOGRAM TRACING: CPT

## 2021-05-31 PROCEDURE — 85025 COMPLETE CBC W/AUTO DIFF WBC: CPT

## 2021-05-31 PROCEDURE — 36415 COLL VENOUS BLD VENIPUNCTURE: CPT

## 2021-05-31 PROCEDURE — 80053 COMPREHEN METABOLIC PANEL: CPT

## 2021-05-31 RX ORDER — DIPHENHYDRAMINE HYDROCHLORIDE 50 MG/ML
25 INJECTION, SOLUTION INTRAMUSCULAR; INTRAVENOUS
Status: COMPLETED | OUTPATIENT
Start: 2021-05-31 | End: 2021-06-01

## 2021-06-01 ENCOUNTER — APPOINTMENT (OUTPATIENT)
Dept: CT IMAGING | Age: 26
End: 2021-06-01
Attending: EMERGENCY MEDICINE
Payer: COMMERCIAL

## 2021-06-01 VITALS
RESPIRATION RATE: 16 BRPM | SYSTOLIC BLOOD PRESSURE: 122 MMHG | BODY MASS INDEX: 50.02 KG/M2 | HEART RATE: 65 BPM | DIASTOLIC BLOOD PRESSURE: 59 MMHG | WEIGHT: 293 LBS | OXYGEN SATURATION: 99 % | HEIGHT: 64 IN | TEMPERATURE: 98.2 F

## 2021-06-01 LAB
ALBUMIN SERPL-MCNC: 3 G/DL (ref 3.5–5)
ALBUMIN/GLOB SERPL: 0.7 {RATIO} (ref 1.1–2.2)
ALP SERPL-CCNC: 115 U/L (ref 45–117)
ALT SERPL-CCNC: 27 U/L (ref 12–78)
ANION GAP SERPL CALC-SCNC: 3 MMOL/L (ref 5–15)
AST SERPL-CCNC: 7 U/L (ref 15–37)
ATRIAL RATE: 92 BPM
BASOPHILS # BLD: 0.1 K/UL (ref 0–0.1)
BASOPHILS NFR BLD: 0 % (ref 0–1)
BILIRUB SERPL-MCNC: 0.2 MG/DL (ref 0.2–1)
BUN SERPL-MCNC: 20 MG/DL (ref 6–20)
BUN/CREAT SERPL: 15 (ref 12–20)
CALCIUM SERPL-MCNC: 8.5 MG/DL (ref 8.5–10.1)
CALCULATED P AXIS, ECG09: 80 DEGREES
CALCULATED R AXIS, ECG10: 43 DEGREES
CALCULATED T AXIS, ECG11: 46 DEGREES
CHLORIDE SERPL-SCNC: 108 MMOL/L (ref 97–108)
CO2 SERPL-SCNC: 30 MMOL/L (ref 21–32)
COMMENT, HOLDF: NORMAL
CREAT SERPL-MCNC: 1.34 MG/DL (ref 0.55–1.02)
D DIMER PPP FEU-MCNC: 0.77 MG/L FEU (ref 0–0.65)
DIAGNOSIS, 93000: NORMAL
DIFFERENTIAL METHOD BLD: ABNORMAL
EOSINOPHIL # BLD: 0 K/UL (ref 0–0.4)
EOSINOPHIL NFR BLD: 0 % (ref 0–7)
ERYTHROCYTE [DISTWIDTH] IN BLOOD BY AUTOMATED COUNT: 15.7 % (ref 11.5–14.5)
GLOBULIN SER CALC-MCNC: 4.2 G/DL (ref 2–4)
GLUCOSE SERPL-MCNC: 127 MG/DL (ref 65–100)
HCG UR QL: NEGATIVE
HCT VFR BLD AUTO: 38.6 % (ref 35–47)
HGB BLD-MCNC: 11.8 G/DL (ref 11.5–16)
IMM GRANULOCYTES # BLD AUTO: 0.3 K/UL (ref 0–0.04)
IMM GRANULOCYTES NFR BLD AUTO: 2 % (ref 0–0.5)
LYMPHOCYTES # BLD: 2.8 K/UL (ref 0.8–3.5)
LYMPHOCYTES NFR BLD: 16 % (ref 12–49)
MCH RBC QN AUTO: 25.4 PG (ref 26–34)
MCHC RBC AUTO-ENTMCNC: 30.6 G/DL (ref 30–36.5)
MCV RBC AUTO: 83.2 FL (ref 80–99)
MONOCYTES # BLD: 0.4 K/UL (ref 0–1)
MONOCYTES NFR BLD: 2 % (ref 5–13)
NEUTS SEG # BLD: 14.6 K/UL (ref 1.8–8)
NEUTS SEG NFR BLD: 80 % (ref 32–75)
NRBC # BLD: 0 K/UL (ref 0–0.01)
NRBC BLD-RTO: 0 PER 100 WBC
P-R INTERVAL, ECG05: 158 MS
PLATELET # BLD AUTO: 395 K/UL (ref 150–400)
PMV BLD AUTO: 10.1 FL (ref 8.9–12.9)
POTASSIUM SERPL-SCNC: 4 MMOL/L (ref 3.5–5.1)
PROT SERPL-MCNC: 7.2 G/DL (ref 6.4–8.2)
Q-T INTERVAL, ECG07: 342 MS
QRS DURATION, ECG06: 78 MS
QTC CALCULATION (BEZET), ECG08: 422 MS
RBC # BLD AUTO: 4.64 M/UL (ref 3.8–5.2)
SAMPLES BEING HELD,HOLD: NORMAL
SODIUM SERPL-SCNC: 141 MMOL/L (ref 136–145)
TROPONIN I SERPL-MCNC: <0.05 NG/ML
VENTRICULAR RATE, ECG03: 92 BPM
WBC # BLD AUTO: 18.2 K/UL (ref 3.6–11)

## 2021-06-01 PROCEDURE — 74011250636 HC RX REV CODE- 250/636: Performed by: STUDENT IN AN ORGANIZED HEALTH CARE EDUCATION/TRAINING PROGRAM

## 2021-06-01 PROCEDURE — 85379 FIBRIN DEGRADATION QUANT: CPT

## 2021-06-01 PROCEDURE — 74011000636 HC RX REV CODE- 636: Performed by: RADIOLOGY

## 2021-06-01 PROCEDURE — 96374 THER/PROPH/DIAG INJ IV PUSH: CPT

## 2021-06-01 PROCEDURE — 96375 TX/PRO/DX INJ NEW DRUG ADDON: CPT

## 2021-06-01 PROCEDURE — 71275 CT ANGIOGRAPHY CHEST: CPT

## 2021-06-01 PROCEDURE — 99285 EMERGENCY DEPT VISIT HI MDM: CPT

## 2021-06-01 PROCEDURE — 81025 URINE PREGNANCY TEST: CPT

## 2021-06-01 RX ORDER — FAMOTIDINE 20 MG/1
20 TABLET, FILM COATED ORAL 2 TIMES DAILY
Qty: 14 TABLET | Refills: 0 | Status: SHIPPED | OUTPATIENT
Start: 2021-06-01 | End: 2021-06-08

## 2021-06-01 RX ADMIN — DIPHENHYDRAMINE HYDROCHLORIDE 25 MG: 50 INJECTION, SOLUTION INTRAMUSCULAR; INTRAVENOUS at 00:15

## 2021-06-01 RX ADMIN — IOPAMIDOL 80 ML: 755 INJECTION, SOLUTION INTRAVENOUS at 03:43

## 2021-06-01 RX ADMIN — FAMOTIDINE 20 MG: 10 INJECTION, SOLUTION INTRAVENOUS at 00:15

## 2021-06-01 NOTE — ED TRIAGE NOTES
Patient presents to ED for c/o allergic reaction, CP and SOB. States she is unsure of what triggered the allergic reaction, but has been having this reaction for over a week. Pt reports that hives have been intermittently apparent throughout the week to arms, legs, and back. [facility] and then went back and received a stronger dose mid-week since symptoms had not resolved. Denies sensation of throat closing, but does report \"odd feeling when swallowing\". Patient able to speak in complete sentences during triage with RA sat 98%. Does appear to have labored breathing.

## 2021-06-01 NOTE — ED PROVIDER NOTES
Patient is a 23year old female who presents to ED c/o allergic reaction which started 1 week prior. Patient reports she initially developed hives 1 week prior and was seen at [facility] and given a medrol dose pack. She reports improvement of urticaria initially but then symptoms returned and she was seen again and started on Prednisone 20mg daily and Atarax. Patient reports she was feeling better until earlier tonight when she developed urticaria, lip swelling, chest pain and shortness of breath. She was seen at [facility] earlier and given Solumedrol 125mg IV and Epi 1mg/mL IM. Patient reports limited improvement of symptoms so she was referred to ED. Patient denies any throat swelling, difficulty swallowing, cough, eye swelling, palpitations, difficulty breathing, and headache. Patient also denies any known cause of allergic reaction or similar sx previously. She specifically denies any new detergents, soaps, lotions, creams, foods, or exposures. Past Medical History:   Diagnosis Date    Abnormal Papanicolaou smear of cervix     Abnormality in fetal heart rate and rhythm complicating labor and delivery 2/3/2021    Asthma     Psychiatric problem     depression       No past surgical history on file.       Family History:   Problem Relation Age of Onset    Diabetes Mother        Social History     Socioeconomic History    Marital status: SINGLE     Spouse name: Not on file    Number of children: Not on file    Years of education: Not on file    Highest education level: Not on file   Occupational History    Not on file   Tobacco Use    Smoking status: Not on file   Substance and Sexual Activity    Alcohol use: Not on file    Drug use: Not on file    Sexual activity: Not on file   Other Topics Concern     Service Not Asked    Blood Transfusions Not Asked    Caffeine Concern Not Asked    Occupational Exposure Not Asked    Hobby Hazards Not Asked    Sleep Concern Not Asked  Stress Concern Not Asked    Weight Concern Not Asked    Special Diet Not Asked    Back Care Not Asked    Exercise Not Asked    Bike Helmet Not Asked   [address] Not Asked    Self-Exams Not Asked   Social History Narrative    Not on file     Social Determinants of Health     Financial Resource Strain:     Difficulty of Paying Living Expenses:    Food Insecurity:     Worried About Running Out of Food in the Last Year:     Ran Out of Food in the Last Year:    Transportation Needs:     Lack of Transportation (Medical):  Lack of Transportation (Non-Medical):    Physical Activity:     Days of Exercise per Week:     Minutes of Exercise per Session:    Stress:     Feeling of Stress :    Social Connections:     Frequency of Communication with Friends and Family:     Frequency of Social Gatherings with Friends and Family:     Attends Jehovah's witness Services:     Active Member of Clubs or Organizations:     Attends Club or Organization Meetings:     Marital Status:    Intimate Partner Violence:     Fear of Current or Ex-Partner:     Emotionally Abused:     Physically Abused:     Sexually Abused: ALLERGIES: Patient has no known allergies. Review of Systems   Constitutional: Negative for chills and fever. HENT: Positive for facial swelling. Negative for congestion, drooling, ear discharge, ear pain, sinus pressure, sinus pain, sneezing, sore throat, trouble swallowing and voice change. Eyes: Negative for pain, discharge and redness. Respiratory: Positive for shortness of breath. Negative for cough, wheezing and stridor. Cardiovascular: Positive for chest pain. Negative for palpitations and leg swelling. Gastrointestinal: Negative for abdominal pain, diarrhea, nausea and vomiting. Genitourinary: Negative for dysuria and urgency. Musculoskeletal: Negative for back pain and neck pain. Skin: Positive for rash. Negative for wound.    Allergic/Immunologic: Negative for environmental allergies, food allergies and immunocompromised state. Neurological: Negative for syncope, weakness and headaches. Psychiatric/Behavioral: Negative for confusion. All other systems reviewed and are negative. There were no vitals filed for this visit. Physical Exam  Vitals and nursing note reviewed. Constitutional:       General: She is not in acute distress. Appearance: Normal appearance. She is well-developed. She is not toxic-appearing. HENT:      Head: Normocephalic and atraumatic. Nose: Nose normal.      Mouth/Throat:      Lips: Pink. Mouth: Mucous membranes are moist.      Comments: No lip or tongue swelling noted. No tonsillar or pharyngeal erythema or swelling. Airway is patent. Eyes:      General: Lids are normal.      Extraocular Movements: Extraocular movements intact. Conjunctiva/sclera: Conjunctivae normal.   Cardiovascular:      Rate and Rhythm: Normal rate and regular rhythm. Pulses: Normal pulses. Heart sounds: Normal heart sounds, S1 normal and S2 normal.   Pulmonary:      Effort: Pulmonary effort is normal. No accessory muscle usage or respiratory distress. Breath sounds: Normal breath sounds. No stridor. No wheezing, rhonchi or rales. Abdominal:      General: Bowel sounds are normal.      Palpations: Abdomen is soft. Musculoskeletal:         General: Normal range of motion. Cervical back: Normal range of motion and neck supple. Skin:     General: Skin is warm and dry. Capillary Refill: Capillary refill takes less than 2 seconds. Comments: Diffuse urticaria noted to bilateral upper extremities. Neurological:      General: No focal deficit present. Mental Status: She is alert and oriented to person, place, and time. Mental status is at baseline.    Psychiatric:         Attention and Perception: Attention normal.         Mood and Affect: Mood and affect normal.         Speech: Speech normal. Behavior: Behavior is cooperative. Thought Content:  Thought content normal.         Cognition and Memory: Cognition normal.         Judgment: Judgment normal.          MDM  Number of Diagnoses or Management Options     Amount and/or Complexity of Data Reviewed  Clinical lab tests: reviewed  Discuss the patient with other providers: (Dr. Ronald Anthony, ED Attending )           Procedures

## 2021-06-01 NOTE — DISCHARGE INSTRUCTIONS
We hope that we have addressed all of your medical concerns. The examination and treatment you received in the Emergency Department were for an emergent problem and were not intended as complete care. It is important that you follow up with your healthcare provider(s) for ongoing care. If your symptoms worsen or do not improve as expected, and you are unable to reach your usual health care provider(s), you should return to the Emergency Department. Today's healthcare is undergoing tremendous change, and patient satisfaction surveys are one of the many tools to assess the quality of medical care. You may receive a survey from the [employer] regarding your experience in the Emergency Department. I hope that your experience has been completely positive, particularly the medical care that I provided. As such, please participate in the survey; anything less than excellent does not meet my expectations or intentions. [facility] and [facility] participate in nationally recognized quality of care measures. If your blood pressure is greater than 120/80, as reported below, we urge that you seek medical care to address the potential of high blood pressure, commonly known as hypertension. Hypertension can be hereditary or can be caused by certain medical conditions, pain, stress, or \"white coat syndrome. \"       Please make an appointment with your health care provider(s) for follow up of your Emergency Department visit.        VITALS:   Patient Vitals for the past 8 hrs:   Temp Pulse Resp BP SpO2   06/01/21 0347 -- -- -- (!) 122/59 99 %   06/01/21 0330 -- -- -- (!) 144/76 97 %   06/01/21 0315 -- -- -- 124/64 98 %   06/01/21 0300 -- -- -- (!) 116/54 99 %   06/01/21 0245 -- -- -- (!) 112/54 99 %   06/01/21 0230 -- -- -- (!) 111/45 97 %   06/01/21 0215 -- -- -- 113/75 99 %   06/01/21 0200 -- -- -- 118/61 100 %   06/01/21 0145 -- -- -- (!) 114/54 99 % 06/01/21 0130 -- -- -- 117/62 99 %   06/01/21 0100 -- -- -- (!) 120/54 98 %   06/01/21 0045 -- -- -- (!) 106/55 99 %   06/01/21 0015 -- -- -- 116/64 100 %   06/01/21 0000 -- -- -- 113/61 98 %   05/31/21 2345 -- -- -- 101/60 97 %   05/31/21 2330 -- -- -- 114/60 99 %   05/31/21 2315 -- -- -- (!) 107/91 100 %   05/31/21 2301 98.4 °F (36.9 °C) 85 20 (!) 153/75 100 %   05/31/21 2300 -- -- -- (!) 153/75 99 %          Thank you for allowing us to provide you with medical care today. We realize that you have many choices for your emergency care needs. Please choose us in the future for any continued health care needs. [facility]ordy Mcdonnell [facility], [address] [place] [address] 20.   Office: [phone]            Recent Results (from the past 24 hour(s))   CBC WITH AUTOMATED DIFF    Collection Time: 05/31/21 11:52 PM   Result Value Ref Range    WBC 18.2 (H) 3.6 - 11.0 K/uL    RBC 4.64 3.80 - 5.20 M/uL    HGB 11.8 11.5 - 16.0 g/dL    HCT 38.6 35.0 - 47.0 %    MCV 83.2 80.0 - 99.0 FL    MCH 25.4 (L) 26.0 - 34.0 PG    MCHC 30.6 30.0 - 36.5 g/dL    RDW 15.7 (H) 11.5 - 14.5 %    PLATELET 214 605 - 270 K/uL    MPV 10.1 8.9 - 12.9 FL    NRBC 0.0 0  WBC    ABSOLUTE NRBC 0.00 0.00 - 0.01 K/uL    NEUTROPHILS 80 (H) 32 - 75 %    LYMPHOCYTES 16 12 - 49 %    MONOCYTES 2 (L) 5 - 13 %    EOSINOPHILS 0 0 - 7 %    BASOPHILS 0 0 - 1 %    IMMATURE GRANULOCYTES 2 (H) 0.0 - 0.5 %    ABS. NEUTROPHILS 14.6 (H) 1.8 - 8.0 K/UL    ABS. LYMPHOCYTES 2.8 0.8 - 3.5 K/UL    ABS. MONOCYTES 0.4 0.0 - 1.0 K/UL    ABS. EOSINOPHILS 0.0 0.0 - 0.4 K/UL    ABS. BASOPHILS 0.1 0.0 - 0.1 K/UL    ABS. IMM.  GRANS. 0.3 (H) 0.00 - 0.04 K/UL    DF AUTOMATED     METABOLIC PANEL, COMPREHENSIVE    Collection Time: 05/31/21 11:52 PM   Result Value Ref Range    Sodium 141 136 - 145 mmol/L    Potassium 4.0 3.5 - 5.1 mmol/L    Chloride 108 97 - 108 mmol/L    CO2 30 21 - 32 mmol/L    Anion gap 3 (L) 5 - 15 mmol/L    Glucose 127 (H) 65 - 100 mg/dL BUN 20 6 - 20 MG/DL    Creatinine 1.34 (H) 0.55 - 1.02 MG/DL    BUN/Creatinine ratio 15 12 - 20      GFR est AA 59 (L) >60 ml/min/1.73m2    GFR est non-AA 49 (L) >60 ml/min/1.73m2    Calcium 8.5 8.5 - 10.1 MG/DL    Bilirubin, total 0.2 0.2 - 1.0 MG/DL    ALT (SGPT) 27 12 - 78 U/L    AST (SGOT) 7 (L) 15 - 37 U/L    Alk. phosphatase 115 45 - 117 U/L    Protein, total 7.2 6.4 - 8.2 g/dL    Albumin 3.0 (L) 3.5 - 5.0 g/dL    Globulin 4.2 (H) 2.0 - 4.0 g/dL    A-G Ratio 0.7 (L) 1.1 - 2.2     TROPONIN I    Collection Time: 05/31/21 11:52 PM   Result Value Ref Range    Troponin-I, Qt. <0.05 <0.05 ng/mL   SAMPLES BEING HELD    Collection Time: 05/31/21 11:52 PM   Result Value Ref Range    SAMPLES BEING HELD 1SST,1RD,1DRK GRN     COMMENT        Add-on orders for these samples will be processed based on acceptable specimen integrity and analyte stability, which may vary by analyte. HCG URINE, QL. - POC    Collection Time: 06/01/21  1:20 AM   Result Value Ref Range    Pregnancy test,urine (POC) Negative NEG     D DIMER    Collection Time: 06/01/21  2:22 AM   Result Value Ref Range    D-dimer 0.77 (H) 0.00 - 0.65 mg/L FEU       CTA CHEST W OR W WO CONT    Result Date: 6/1/2021  INDICATION: Chest pain. Elevated d-dimer. COMPARISON:None TECHNIQUE:  Routine noncontrast imaging the chest was performed for localization purposes. Then, following the uneventful intravenous administration of 80 cc [id number], thin helical axial images were obtained through the chest. 3D image postprocessing was performed. CT dose reduction was achieved through use of a standardized protocol tailored for this examination and automatic exposure control for dose modulation. FINDINGS: CHEST WALL: No chest wall mass or axillary lymphadenopathy. THYROID: No nodule. MEDIASTINUM: No mass or lymphadenopathy. BHASKAR: No mass or lymphadenopathy. THORACIC AORTA: No dissection or aneurysm. PULMONARY ARTERIES: Main pulmonary artery is normal in caliber.  No evidence of acute pulmonary emboli. TRACHEA/BRONCHI: Patent. ESOPHAGUS: No wall thickening or dilatation. HEART: Normal in size. PLEURA: No effusion or pneumothorax. LUNGS: No nodule, mass, or airspace disease. INCIDENTALLY IMAGED UPPER ABDOMEN: No focal abnormality. BONES: No destructive bone lesion. ADDITIONAL COMMENTS: N/A     No evidence of acute pulmonary embolus or other acute cardiopulmonary process.

## 2021-06-01 NOTE — ED NOTES
Pt given a urine cup for sample. She was able to ambulate to and from restroom with a steady gait and no complications.

## 2021-08-31 ENCOUNTER — VIRTUAL VISIT (OUTPATIENT)
Dept: SURGERY | Age: 26
End: 2021-08-31
Payer: COMMERCIAL

## 2021-08-31 VITALS — HEIGHT: 64 IN | WEIGHT: 293 LBS | BODY MASS INDEX: 50.02 KG/M2

## 2021-08-31 DIAGNOSIS — K21.9 GASTROESOPHAGEAL REFLUX DISEASE WITHOUT ESOPHAGITIS: ICD-10-CM

## 2021-08-31 DIAGNOSIS — J45.20 MILD INTERMITTENT ASTHMA WITHOUT COMPLICATION: ICD-10-CM

## 2021-08-31 DIAGNOSIS — E66.01 MORBID OBESITY (HCC): Primary | ICD-10-CM

## 2021-08-31 PROCEDURE — 99443 PR PHYS/QHP TELEPHONE EVALUATION 21-30 MIN: CPT | Performed by: SURGERY

## 2021-08-31 RX ORDER — HYDROXYZINE 25 MG/1
25 TABLET, FILM COATED ORAL 3 TIMES DAILY
COMMUNITY
Start: 2021-08-09 | End: 2022-07-14 | Stop reason: ALTCHOICE

## 2021-08-31 RX ORDER — MONTELUKAST SODIUM 10 MG/1
TABLET ORAL
COMMUNITY
Start: 2021-08-30

## 2021-08-31 RX ORDER — CHOLECALCIFEROL (VITAMIN D3) 50 MCG
CAPSULE ORAL
COMMUNITY

## 2021-08-31 RX ORDER — MINERAL OIL
180 ENEMA (ML) RECTAL
COMMUNITY
End: 2022-03-01

## 2021-08-31 RX ORDER — PHENTERMINE HYDROCHLORIDE 37.5 MG/1
TABLET ORAL
COMMUNITY
Start: 2021-07-23 | End: 2022-03-01

## 2021-08-31 RX ORDER — FAMOTIDINE 20 MG/1
20 TABLET, FILM COATED ORAL 2 TIMES DAILY
COMMUNITY
Start: 2021-08-07 | End: 2022-07-14 | Stop reason: ALTCHOICE

## 2021-08-31 RX ORDER — BUPROPION HYDROCHLORIDE 300 MG/1
TABLET ORAL
COMMUNITY
Start: 2021-08-09 | End: 2022-09-08 | Stop reason: ALTCHOICE

## 2021-09-01 ENCOUNTER — HOSPITAL ENCOUNTER (EMERGENCY)
Age: 26
Discharge: HOME | End: 2021-09-01
Attending: STUDENT IN AN ORGANIZED HEALTH CARE EDUCATION/TRAINING PROGRAM
Payer: COMMERCIAL

## 2021-09-01 ENCOUNTER — APPOINTMENT (OUTPATIENT)
Dept: GENERAL RADIOLOGY | Age: 26
End: 2021-09-01
Attending: PHYSICIAN ASSISTANT
Payer: COMMERCIAL

## 2021-09-01 VITALS
HEIGHT: 64 IN | OXYGEN SATURATION: 100 % | RESPIRATION RATE: 20 BRPM | WEIGHT: 293 LBS | HEART RATE: 84 BPM | DIASTOLIC BLOOD PRESSURE: 89 MMHG | BODY MASS INDEX: 50.02 KG/M2 | SYSTOLIC BLOOD PRESSURE: 163 MMHG | TEMPERATURE: 97.1 F

## 2021-09-01 DIAGNOSIS — S93.492A SPRAIN OF ANTERIOR TALOFIBULAR LIGAMENT OF LEFT ANKLE, INITIAL ENCOUNTER: Primary | ICD-10-CM

## 2021-09-01 PROCEDURE — 99281 EMR DPT VST MAYX REQ PHY/QHP: CPT

## 2021-09-01 PROCEDURE — 73610 X-RAY EXAM OF ANKLE: CPT

## 2021-09-01 RX ORDER — KETOROLAC TROMETHAMINE 10 MG/1
10 TABLET, FILM COATED ORAL
Qty: 12 TABLET | Refills: 0 | Status: SHIPPED | OUTPATIENT
Start: 2021-09-01 | End: 2021-09-04

## 2021-09-01 RX ORDER — IBUPROFEN 800 MG/1
800 TABLET ORAL
Status: DISCONTINUED | OUTPATIENT
Start: 2021-09-01 | End: 2021-09-01 | Stop reason: HOSPADM

## 2021-09-01 NOTE — ED TRIAGE NOTES
Pt reports just prior to arrival she stepped off of a curb and landed on her left ankle wrong. States she heard a \"crack. \" now reports left ankle pain and swelling. Unable to bear weight.

## 2021-09-01 NOTE — ED PROVIDER NOTES
The history is provided by the patient. Ankle Injury   This is a new problem. The current episode started 1 to 2 hours ago. The problem occurs constantly. The problem has not changed since onset. The pain is present in the left ankle. The quality of the pain is described as pounding and aching. The pain is at a severity of 10/10. The pain is severe. Associated symptoms include limited range of motion and stiffness. Pertinent negatives include no back pain and no neck pain. The symptoms are aggravated by movement and palpation. She has tried rest (Tylenol) for the symptoms. The treatment provided no relief. There has been a history of trauma (stepped off curb, twisted ankle).         Past Medical History:   Diagnosis Date    Abnormal Papanicolaou smear of cervix     Abnormality in fetal heart rate and rhythm complicating labor and delivery 2/3/2021    Asthma     Depression     Psychiatric problem     depression       Past Surgical History:   Procedure Laterality Date    HX  SECTION           Family History:   Problem Relation Age of Onset    Diabetes Mother     Hypertension Mother        Social History     Socioeconomic History    Marital status: SINGLE     Spouse name: Not on file    Number of children: Not on file    Years of education: Not on file    Highest education level: Not on file   Occupational History    Not on file   Tobacco Use    Smoking status: Never Smoker    Smokeless tobacco: Never Used   Substance and Sexual Activity    Alcohol use: Yes     Comment: rarely    Drug use: Not on file    Sexual activity: Not on file   Other Topics Concern     Service Not Asked    Blood Transfusions Not Asked    Caffeine Concern Not Asked    Occupational Exposure Not Asked    Hobby Hazards Not Asked    Sleep Concern Not Asked    Stress Concern Not Asked    Weight Concern Not Asked    Special Diet Not Asked    Back Care Not Asked    Exercise Not Asked    Bike Helmet Not Asked   [address] [facility] Not Asked    Self-Exams Not Asked   Social History Narrative    Not on file     Social Determinants of Health     Financial Resource Strain:     Difficulty of Paying Living Expenses:    Food Insecurity:     Worried About Running Out of Food in the Last Year:     [address] in the Last Year:    Transportation Needs:     Lack of Transportation (Medical):  Lack of Transportation (Non-Medical):    Physical Activity:     Days of Exercise per Week:     Minutes of Exercise per Session:    Stress:     Feeling of Stress :    Social Connections:     Frequency of Communication with Friends and Family:     Frequency of Social Gatherings with Friends and Family:     Attends [employer] Services:     Active Member of Clubs or Organizations:     Attends Club or Organization Meetings:     Marital Status:    Intimate Partner Violence:     Fear of Current or Ex-Partner:     Emotionally Abused:     Physically Abused:     Sexually Abused: ALLERGIES: Patient has no known allergies. Review of Systems   Respiratory: Negative for shortness of breath. Cardiovascular: Negative for chest pain. Gastrointestinal: Negative for abdominal pain and vomiting. Musculoskeletal: Positive for arthralgias, joint swelling and stiffness. Negative for back pain and neck pain. See HPI   Skin: Negative for wound. Neurological: Negative for syncope and headaches. All other systems reviewed and are negative. Vitals:    09/01/21 1512   BP: (!) 163/89   Pulse: 84   Resp: 20   Temp: 97.1 °F (36.2 °C)   SpO2: 100%   Weight: 146.3 kg (322 lb 8.5 oz)   Height: 5' 4\" (1.626 m)            Physical Exam  Vitals and nursing note reviewed. Constitutional:       General: She is not in acute distress. Appearance: She is well-developed. She is obese. HENT:      Head: Normocephalic and atraumatic.    Eyes:      Conjunctiva/sclera: Conjunctivae normal.   Cardiovascular:      Rate and Rhythm: Normal rate and regular rhythm. Pulmonary:      Effort: Pulmonary effort is normal. No respiratory distress. Abdominal:      Palpations: Abdomen is soft. Tenderness: There is no abdominal tenderness. There is no guarding. Musculoskeletal:         General: Swelling, tenderness and signs of injury present. Cervical back: Normal range of motion and neck supple. Comments: L lateral ankle, over ATLF  No Achilles tendon defect or TTP. Skin:     General: Skin is warm and dry. Neurological:      Mental Status: She is alert and oriented to person, place, and time. Motor: No abnormal muscle tone. MDM       Procedures    3:44 PM  The patient presented with a complaint of a fall or minor trauma. The patient is now resting comfortably and feels better, is alert and in no distress. The patient has a normal mental status and is neurologically intact. The history, exam, diagnostic testing (if any) and current condition do not demonstrate signs of clinically significant intra-cranial, intra-thoracic, intra-abdominal, or musculoskeletal trauma. The vital signs have been stable. The patient's condition is stable and appropriate for discharge. The patient will pursue further outpatient evaluation with the primary care physician or other designated or consulting physician as indicated in the discharge instructions.

## 2021-09-07 NOTE — PATIENT INSTRUCTIONS
Learning About Weight-Loss (Bariatric) Surgery  What is weight-loss surgery? Bariatric surgery is surgery to help you lose weight. This type of surgery is only used for people who are very overweight and have not been able to lose weight with diet and exercise. This surgery makes the stomach smaller. Some types of surgery also change the connection between your stomach and intestines. Having weight-loss surgery is a big step. After surgery, you'll need to make new, lifelong changes in how you eat and drink. How is weight-loss surgery done? Bariatric surgery may be either \"open\" or \"laparoscopic. \" Open surgery is done through a large cut (incision) in the belly. Laparoscopic surgery is done through several small cuts. The doctor puts a lighted tube, or scope, and other surgical tools through small cuts in your belly. The doctor is able to see your organs with the scope. There are different types of bariatric surgery. Gastric sleeve surgery  The surgery is usually done through several small incisions in the belly. The doctor removes more than half of your stomach. This leaves a thin sleeve, or tube, that is about the size of a banana. Because part of your stomach has been removed, this can't be reversed. Levon-en-Y gastric bypass surgery  Levon-en-Y (say \"meir-en-why\") surgery changes the connection between the stomach and the intestines. The doctor separates a section of your stomach from the rest of your stomach. This makes a small pouch. The new pouch will hold the food you eat. The doctor connects the stomach pouch to the middle part of the small intestine. Gastric banding surgery  The surgery is usually done through several small incisions in the belly. The doctor wraps a band around the upper part of the stomach. This creates a small pouch. The small size of the pouch means that you will get full after you eat just a small amount of food.  The doctor can inflate or deflate the band to adjust the size. This lets the doctor adjust how quickly food passes from the new pouch into the stomach. It does not change the connection between the stomach and the intestines. What can you expect after the surgery? You may stay in the hospital for one or more days after the surgery. How long you stay depends on the type of surgery you had. Most people need 2 to 4 weeks before they are ready to get back to their usual routine. For the first 2 to 6 weeks after surgery, you probably will need to follow a liquid or soft diet. Bit by bit, you will be able to eat more solid foods. Your doctor may advise you to work with a dietitian. This way you'll be sure to get enough protein, vitamins, and minerals while you are losing weight. Even with a healthy diet, you may need to take vitamin and mineral supplements. After surgery, you will not be able to eat very much at one time. You will get full quickly. Try not to eat too much at one time or eat foods that are high in fat or sugar. If you do, you may vomit, get stomach pain, or have diarrhea. You probably will lose weight very quickly in the first few months after surgery. As time goes on, your weight loss will slow down. You will have regular doctor visits to check how you are doing. Think of bariatric surgery as a tool to help you lose weight. It isn't an instant fix. You will still need to eat a healthy diet and get regular exercise. This will help you reach your weight goal and avoid regaining the weight you lose. Follow-up care is a key part of your treatment and safety. Be sure to make and go to all appointments, and call your doctor if you are having problems. It's also a good idea to know your test results and keep a list of the medicines you take. Where can you learn more? Go to http://www.gray.com/  Enter G469 in the search box to learn more about \"Learning About Weight-Loss (Bariatric) Surgery. \"  Current as of: September 23, 2020               Content Version: 12.8  © 7244-2250 Healthwise, Incorporated. Care instructions adapted under license by [employer] (which disclaims liability or warranty for this information). If you have questions about a medical condition or this instruction, always ask your healthcare professional. [address] any warranty or liability for your use of this information.

## 2021-09-07 NOTE — PROGRESS NOTES
I was in the office while conducting this encounter. Consent:  She and/or her healthcare decision maker is aware that this patient-initiated Telehealth encounter is a billable service, with coverage as determined by her insurance carrier. She is aware that she may receive a bill and has provided verbal consent to proceed: Yes    This virtual visit was conducted via [employer]. Pursuant to the emergency declaration under the [facility] [place] [address] waiver authority and the [employer] and [employer] Act, this Virtual  Visit was conducted to reduce the patient's risk of exposure to COVID-19 and provide continuity of care for an established patient. Services were provided through a video synchronous discussion virtually to substitute for in-person clinic visit. Due to this being a TeleHealth evaluation, many elements of the physical examination are unable to be assessed. Total Time: minutes: 21-30 minutes. Bariatric Surgery Consultation    Subjective: The patient is a 23 y.o. obese  female with a Body mass index is 55.36 kg/m². .  The patient has been at her heaviest weight for the past 2 year;  she has been overweight since childhood; she has been considering surgery since 2019;  she desires surgery at this time because medical weight loss has not been successful. Serena Kim has tried multiple diets in her lifetime most recently tried unsupervised diets.     Bariatric comorbidities present are   Patient Active Problem List   Diagnosis Code    Pregnancy Z34.90    Intrauterine growth restriction, antepartum, third trimester, not applicable or unspecified fetus O36.5930    Obesity E66.9    Body mass index 50.0-59.9, adult ([facility] Z68.43    39 weeks gestation of pregnancy Z3A.39    Abnormality in fetal heart rate and rhythm complicating labor and delivery O76    Streptococcus B carrier state complicating childbirth G82.121    Bacteremia R78.81    H/O  section Z98.891   . The patient desires laparoscopic sleeve gastrectomy for surgical weight loss. The patients goal weight is 180 lbs. The highest acceptable weight is 200 lbs. These goals are consistent with expected outcomes of their desired operation. her Medical goals are asthma improvement; her qualty of life goals are decreased fatigue. Patient Active Problem List    Diagnosis Date Noted    Bacteremia 2021    H/O  section 2021    Abnormality in fetal heart rate and rhythm complicating labor and delivery 2021    Streptococcus B carrier state complicating childbirth     Intrauterine growth restriction, antepartum, third trimester, not applicable or unspecified fetus 2021    Obesity 2021    Body mass index 50.0-59.9, adult ([facility] 2021    39 weeks gestation of pregnancy 2021    Pregnancy 2021      Past Surgical History:   Procedure Laterality Date    HX  SECTION        Social History     Tobacco Use    Smoking status: Never Smoker    Smokeless tobacco: Never Used   Substance Use Topics    Alcohol use: Yes     Comment: rarely      Family History   Problem Relation Age of Onset    Diabetes Mother     Hypertension Mother       Prior to Admission medications    Medication Sig Start Date End Date Taking? Authorizing Provider   buPROPion XL (WELLBUTRIN XL) 300 mg XL tablet TAKE 1 TABLET (300 MG) BY ORAL ROUTE ONCE DAILY FOR 90 DAYS 21  Yes Provider, Historical   phentermine (ADIPEX-P) 37.5 mg tablet TAKE ONE HALF TABLET (18.75 MG) BY ORAL ROUTE ONCE DAILY BEFORE BREAKFAST FOR 30 DAYS 21  Yes Provider, Historical   famotidine (PEPCID) 20 mg tablet Take 20 mg by mouth two (2) times a day.  21  Yes Provider, Historical   montelukast (SINGULAIR) 10 mg tablet  21  Yes Provider, Historical   hydrOXYzine HCL (ATARAX) 25 mg tablet TAKE 2 TABLETS BY MOUTH IN THE EVENING 8/9/21  Yes Provider, Historical   fexofenadine (ALLEGRA) 180 mg tablet Take 180 mg by mouth. Yes Provider, Historical   cholecalciferol, vitamin D3, (Vitamin D3) 100 mcg (4,000 unit) cap Take  by mouth. Yes Provider, Historical   prenatal 10/GWEG fum/folic/dha (PRENATAL-1 PO) Take 1 Tab by mouth daily. Yes Provider, Historical     No Known Allergies      Review of Systems:    Review of Systems   Constitutional: Negative for chills, fever and weight loss. Eyes: Negative. Respiratory: Positive for shortness of breath, wheezing and stridor. Cardiovascular: Negative for chest pain and palpitations. Gastrointestinal: Positive for heartburn. Negative for nausea and vomiting. Genitourinary: Negative. Musculoskeletal: Positive for back pain, joint pain and neck pain. Skin: Negative. Neurological: Negative. Endo/Heme/Allergies: Negative. Psychiatric/Behavioral: Negative. Objective:     Visit Vitals  Ht 5' 4\" (1.626 m)   Wt 322 lb 8 oz (146.3 kg)   LMP 08/31/2021 (Approximate)   BMI 55.36 kg/m²       Physical Exam:  GENERAL: alert, cooperative, no distress, appears stated age, morbidly obese      Assessment:     Morbid obesity with comorbidity; no success with medical management. Plan:     laparoscopic sleeve gastrectomy    This is a 23 y.o. female with a BMI of Body mass index is 55.36 kg/m². and the weight-related co-morbidities listed above. Rock Covington meets the NIH criteria for bariatric surgery based upon the BMI of Body mass index is 55.36 kg/m². and multiple weight-related co-morbidities. Rock C[place]ton has elected laparoscopic sleeve gastrectomy as her intervention of choice for treatment of morbid obesity through surgical means secondary to its long term history of success. In the office today, following Raegan's history and physical examination, a 30 minute discussion regarding the anatomic alterations for the laparoscopic sleeve gastrectomy was undertaken.  The dietary expectations and the patient and physician dependent factors for success were thoroughly discussed, to include the need for interval follow-up and long-term dietary changes associated with success. The possible complications of the sleeve gastrectomy were also discussed, to include VTE, staple line leak, bleeding, stricture, infection, dysphagia, open procedure, and poor weight loss/weight regain. Specific weight related outcomes for success were also discussed with an emphasis on careful and close follow-up with the first year. The patient expressed an understanding of the above factors, and her questions were answered in their entirety. In addition, the patient attended a 1.5 hour power point seminar regarding obesity, surgical weight loss including, adjustable gastric band, gastric bypass, and sleeve gastrectomy. This discussion contrasted the different surgical techniques, mechanisms of actions and expected outcomes, and surgical and medical risks associated with each procedure. During this seminar, there was a long question and answer session where each questions was answered until there were no additional questions. Today, the patient had all of her questions answered and desires to proceed with pre-qualification for bariatric surgery initially choosing laparoscopic sleeve gastrectomy as her surgical option. She will schedule psychology evaluation and begin supervised medical weight loss program.  Given reflux symptoms, we will order upper gastrointestinal series to assess for hiatal hernia. We will also order dietitian evaluation. 25 minutes spent in virtual encounter reviewing previous weight loss efforts, choice of procedure, alternatives, post-operative diet, activity restrictions.     Signed By: Eliana Manning MD     September 7, 2021

## 2021-09-17 ENCOUNTER — CLINICAL SUPPORT (OUTPATIENT)
Dept: SURGERY | Age: 26
End: 2021-09-17

## 2021-09-17 DIAGNOSIS — E66.01 MORBID OBESITY (HCC): Primary | ICD-10-CM

## 2021-09-17 NOTE — PROGRESS NOTES
Pre-operative Bariatric Nutrition Evaluation    Date: 2021              Session 1 of 6    Insurance: Gene          Physician/Surgeon:  Dr. Cristina Yusuf    Name: Jaclyn Sanders  :  1995  Age:  23  Gender: Female  Type of Surgery: []   Gastric Bypass   [x]    Sleeve Gastrectomy    ASSESSMENT:    Past Medical History: Asthma, depression/anxiety    Medications/Supplements:   Prior to Admission medications    Medication Sig Start Date End Date Taking? Authorizing Provider   buPROPion XL (WELLBUTRIN XL) 300 mg XL tablet TAKE 1 TABLET (300 MG) BY ORAL ROUTE ONCE DAILY FOR 90 DAYS 21   Provider, Historical   phentermine (ADIPEX-P) 37.5 mg tablet TAKE ONE HALF TABLET (18.75 MG) BY ORAL ROUTE ONCE DAILY BEFORE BREAKFAST FOR 30 DAYS 21   Provider, Historical   famotidine (PEPCID) 20 mg tablet Take 20 mg by mouth two (2) times a day. 21   Provider, Historical   montelukast (SINGULAIR) 10 mg tablet  21   Provider, Historical   hydrOXYzine HCL (ATARAX) 25 mg tablet TAKE 2 TABLETS BY MOUTH IN THE EVENING 21   Provider, Historical   fexofenadine (ALLEGRA) 180 mg tablet Take 180 mg by mouth. Provider, Historical   cholecalciferol, vitamin D3, (Vitamin D3) 100 mcg (4,000 unit) cap Take  by mouth. Provider, Historical   prenatal 38/YWLK fum/folic/dha (PRENATAL-1 PO) Take 1 Tab by mouth daily. Provider, Historical       Smoking: None  Alcohol: occasionally    Food Allergies/Intolerances:None    Anthropometrics:    Ht:64 inches   Wt: 322 lbs ()   IBW: 120    %IBW: 268    BMI: 55  Category: Obesity class III    Reported wt history: Pt presents today for pre-op nutrition evaluation for wt loss surgery. Reports lowest adult BW of 235 lbs and highest adult BW of 330 lbs. Atributes wt gain over the years r/t pregnancy, emotional eating, physical inactivity. Has attempted wt loss through various methods with most successful wt loss of 13 lbs.  Has been unable to maintain long term or significant wt loss and is now seeking approval for weight loss surgery. Pt will need to complete 6 months of supervised weight loss for insurance requirements. Exercise/Physical Activity: walking 2x week for 60 min    Reported Diet History: Atkins, Keto, Weight watchers, diet pills, liquid diet, fasting    24 Hour Diet Recall  Breakfast  none   Snacks  none   Lunch  Casey, chips or skips   Snacks  none   Dinner  Chicken sandwich, fries; or salad or pizza- take out/out to eat   Snacks  none   Beverages  Diet coke, water, tea-sweet, coffee   Emotional eating when stressed-lack of money    Environment/Psychosocial/Support:Ranks support 10 of 10 consisting of boyfriend, mother, boyfriend's mother, step father and older brother. Boyfriends mom had gastric bypass. 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      NUTRITION DIAGNOSIS:  Food and nutrition related knowledge deficit r/t lack of prior exposure to information evidenced by pt demonstrates need for nutrition education for sleeve gastrectomy      NUTRITION INTERVENTION:  Pt educated on nutrition recommendations for weight loss surgery, specifically sleeve gastrectomy. Instructed on consuming 3 meals per day starting now. Use the balanced plate method to plan meals, include 3 oz of lean source of protein, 1/2 cup whole grains, unlimited non-starchy vegetables, 1/2 cup fruit and 1 serving of low fat dairy. Utilize handouts listing healthy snack and meal ideas to limit restaurant meals. After surgery measure all meals to 1/2 cup. Each meal will contain a 1/4 cup lean protein and 1/4 cup fruit, non-starchy vegetable or starch (limiting to once per day). Aim for 60 g protein per day. Sip on 48-64 oz of sugar free, calorie free, non-carbonated beverages each day. Do not use a straw. Do not consume beverages 30 minutes before, during or 30 minutes after meals. Read all nutrition labels.  Demonstrated and emphasized identifying serving size, total fat, sugar and protein content. Defined low fat as </= 3 g per serving. Discussed lean and extra lean sources of protein. Provided list of low fat cooking methods. Avoid foods with sugar listed in the first 3 ingredients and >/15 g sugar per serving. Excess sugar/fat intake may lead to dumping syndrome. Discussed signs and symptoms of dumping syndrome. Practice mindful eating habits; take small bites, chew thoroughly, avoid distractions, utilize hunger/fullness scale. Consume meals over 20-30 minutes. Attend Bariatric Support Group and increase physical activity (approved per MD) for long term weight maintenance. NUTRITION MONITORING AND EVALUATION:    The following goals were established with patient;  1. Eat 3 meals a day- protein with each meal  2. Purchase healthy snacks  3. Increase water from 32 oz to 64 oz per day  4. Review nutrition education materials. Follow up next month for continue nutrition education    Specific tips and techniques to facilitate compliance with above recommendations were provided and discussed. Nutrition evaluation reveals important lifestyle changes are indicated. Goals set and recommendations made. Will continue to assess. If further details are desired please feel free to contact me at [phone]. This phone number was also provided to the patient for any further questions or concerns.            Fernando Cruz RD Normal for race

## 2021-09-20 ENCOUNTER — HOSPITAL ENCOUNTER (OUTPATIENT)
Dept: GENERAL RADIOLOGY | Age: 26
Discharge: HOME | End: 2021-09-20
Attending: SURGERY
Payer: COMMERCIAL

## 2021-09-20 DIAGNOSIS — K21.9 GASTROESOPHAGEAL REFLUX DISEASE WITHOUT ESOPHAGITIS: ICD-10-CM

## 2021-09-20 PROCEDURE — 74246 X-RAY XM UPR GI TRC 2CNTRST: CPT

## 2021-10-18 ENCOUNTER — CLINICAL SUPPORT (OUTPATIENT)
Dept: SURGERY | Age: 26
End: 2021-10-18

## 2021-10-18 DIAGNOSIS — E66.01 MORBID OBESITY (HCC): Primary | ICD-10-CM

## 2021-10-18 NOTE — PROGRESS NOTES
[employer] [facility]  Supervised Weight Loss     Date:   10/18/2021    Patient's Name: Jenelle Bryan  : 1995    Insurance:  [place]          Session: 2 of  6  Surgery: Sleeve gastrectomy  Surgeon: Dr. Gerry Mcqueen    Height: 64 inches Weight: 322  Lbs (last months wt)   BMI: 55   Pounds Lost since last month: 0               Pounds Gained since last month: 0    Starting Weight: 322 lbs  Previous Months Weight: 322 lbs  Overall Pounds Lost: 0 Overall Pounds Gained: 0    Other Pertinent Information: Today's appointment was completed in a virtual setting d/t COVID-19.        Smoking Status: None  Alcohol Intake: occasionally    I have reviewed with pt the guidelines of the supervised wt loss program.  Pt understands the expectations of some wt loss during the program and that wt gain could delay the process. I have also explained that appointments need to be consecutive and missing an appointment may result in starting over. Pt has received this information in writing. Changes that patient has made since last month include: using air fryer, increased water, consistent meals. Eating Habits and Behaviors  General healthy eating guidelines were also discussed. Pts were instructed that their plate should be made up 1/2 plate coming from non-starchy vegetables, 1/4 coming from lean meat, and 1/4 of their plate coming from carbohydrates, including fruits, starches, or milk. We discussed measuring meals to 1/2 cup total per meal after surgery. Drinking only calorie-free, sugar-free and non-carbonated beverages. We discussed the importance of drinking 64 ounces of fluid per day to prevent dehydration post-operatively. Patient's current diet habits include: Pt is eating 3 meals per day. Snack choices include chips, fruit, Greek yogurt. Pt is eating refined carbohydrate foods (bread, pasta, rice, potatoes) daily.  Pt is eating sweets/desserts several times per week. Pt is using air frying cooking methods. Pt is eating meals prepared outside of the home 2-3x week. Pt is drinking coffee, juice, water, diet soda, green tea, diet Ruthy tea. Pt reports some emotional eating (less than last month). States household now in agreement to eat healthy with pt. Physical Activity/Exercise  An exercise presentation was provided including information about exercise programs available both before and after surgery. We talked about the importance of increasing daily physical activity and beginning to develop an exercise regimen/routine. We talked about exercise as being an important part of long term weight loss after surgery. Comments:  During class, I discussed with patient the importance of getting into an exercise routine. Pt is currently walking (2 laps around apt complex) 1-2x week; walking with residents (at work) for activity 1-2 x week; doing at home workouts 1x week; also doing sit ups and planks. Pt has been encouraged to increase frequency/consistency of exercise. Behavior Modification     We talked about how to eat more mindfully. Tips and recommendations for how to make these changes were provided. Pt was encouraged to keep a food journal and record what they were taking in daily. Overall Assessment: Nutrition evaluation reveals important lifestyle changes are indicated. Goals set and recommendations made. Will continue to assess      Patient-Set Goals:   1. Nutrition - 64 oz water daily; limit candy bars to 1 per week; use balanced plate for meals  2. Exercise - consistently exercise for 60 min, 4x week  3.  Behavior - purchase scale and portioned  plate    Thu Oates, STEPHANIE  10/18/2021

## 2021-11-15 ENCOUNTER — CLINICAL SUPPORT (OUTPATIENT)
Dept: SURGERY | Age: 26
End: 2021-11-15

## 2021-11-15 DIAGNOSIS — E66.01 MORBID OBESITY (HCC): Primary | ICD-10-CM

## 2021-11-15 NOTE — PROGRESS NOTES
[employer] [facility]  Supervised Weight Loss     Date:   11/15/2021    Patient's Name: Bolivar Naidu  : 1995    Insurance:  [employer]          Session: 3 of  6  Surgery: Sleeve gastrectomy                      Surgeon: Dr. Abdoulaye Posey     Height: 64 inches       Weight: 319.7      (pt reported)                    BMI: 55             Pounds Lost since last month: 2.3 lbs              Pounds Gained since last month: 0     Starting Weight: 322 lbs                   Previous Months Weight: 322 lbs  Overall Pounds Lost: 2.3 lbs        Overall Pounds Gained: 0    Other Pertinent Information: Today's appointment was completed in a virtual setting d/t COVID-19. Smoking Status: None  Alcohol Intake: None    I have reviewed with pt the guidelines of the supervised wt loss program.  Pt understands the expectations of some wt loss during the program and that wt gain could delay the process. I have also explained that appointments need to be consecutive and missing an appointment may result in starting over. Pt has received this information in writing. Changes that patient has made since last month include:  Started keto diet (low carb)-tracking calories, bought bariatric plate and a scale; started anti-anxiety meds and Metformin; taking Melatonin at night; increased water and decreased carbonated beverages to 1 can per day. Eating Habits and Behaviors  A nutrition lesson was presented on label reading with specific guidelines provided for limiting added sugars. This information will help increase healthy food choices, promote weight loss and prevent dumping syndrome after gastric bypass. We also reviewed the general nutrition guidelines for bariatric surgery. Patient's current diet habits include: Pt is eating 3 meals per day. Snack choices include keto protein bars, keto mini reeses cups.  Pt is eating refined carbohydrate foods (bread, pasta, rice, potatoes) rarely. Pt is eating sweets/desserts once weekly. Pt is using air frying cooking methods. Pt is eating meals prepared outside of the home 4x month. Pt is drinking water, diet soda-1 per day, diet tea. Pt reports sometimes emotionally eating- seeing therapist.         Physical Activity/Exercise  We talked about the importance of increasing daily physical activity and beginning to develop an exercise regimen/routine. We talked about exercise as being an important part of long term weight loss after surgery. Comments:  During class, I discussed with patient the importance of getting into an exercise routine. Pt is currently using Infinity Hoop for 5 min 2x week and doing walking videos 2x week for activity; will be adding in Giraffe Friend. Pt has been encouraged to continue with current exercise. Behavior Modification     We talked about how to eat more mindfully. Tips and recommendations for how to make these changes were provided. Pt was encouraged to keep a food journal and record what they were taking in daily. Overall Assessment: Nutrition evaluation reveals important lifestyle changes are indicated. Goals set and recommendations made. Will continue to assess    Patient-Set Goals:   1. Nutrition - increase water intake to 5 bottles per day- drink throughout day  2. Exercise - continue with exercise 4x week; may try Ruben  3.  Behavior -track food intake intake; eat meals slowly, chewing food well; purchase water bottle w/bharath Obrien, STEPHANIE  11/15/2021

## 2021-12-17 ENCOUNTER — CLINICAL SUPPORT (OUTPATIENT)
Dept: SURGERY | Age: 26
End: 2021-12-17

## 2021-12-17 DIAGNOSIS — E66.01 MORBID OBESITY (HCC): Primary | ICD-10-CM

## 2021-12-17 NOTE — PROGRESS NOTES
[address] [facility]  Supervised Weight Loss     Date:   2021    Patient's Name: Urszula Rubio  : 1995    Insurance:  [place]          Session: 4 of  6  Surgery: Sleeve gastrectomy                      Surgeon: Dr. Vigil Bones: 272      (WH reported)                    BMI: 26             Pounds Lost since last month:  2.7 lbs              Pounds Gained since last month: 0     Starting Weight: 322 lbs                   Previous Months Weight: 319.7 lbs  Overall Pounds Lost: 5 lbs        Overall Pounds Gained: 0       Other Pertinent Information: Today's appointment was completed in a virtual setting d/t COVID-19. Pt states all insurance requirements- only has nutrition appts left to complete. Smoking Status:  None  Alcohol Intake: None    I have reviewed with pt the guidelines of the supervised wt loss program.  Pt understands the expectations of some wt loss during the program and that wt gain could delay the process. I have also explained that classes need to be consecutive. Missing a class may result in starting over. Pt has received this information in writing. Changes that patient has made since last month include: eliminated diet soda, increased water, tracking food intake, purchased water bottle with markings      Eating Habits and Behaviors  General healthy eating guidelines were discussed. A nutrition lesson specific to the importance of protein intake after surgery was provided. We discussed food sources of protein, protein supplements and multiple reasons as to why protein is important after bariatric surgery. Pts were instructed to focus on including protein at every meal and practice eating protein first at the meal. Pts were encouraged to sample a protein shake for tolerance.  Patients were also instructed to use the balanced plate method for help with portion control and general healthy eating prior to surgery. We discussed measuring meals to 1/2 cup total per meal after surgery. Drinking only calorie-free, sugar-free and non-carbonated beverages. We discussed the importance of drinking 64 ounces of fluid per day to prevent dehydration post-operatively. Patient's current diet habits include: Pt is eating 2-3 meals per day (B: eggs, aviles, toast, L: sandwich, popcorn, D: soup or chicken). Snack choices include keto protein bars, keto mini reeses cups. Pt is eating refined carbohydrate foods (bread, pasta, rice, potatoes) rarely. Pt is eating sweets/desserts once weekly. Pt is using air frying cooking methods. Pt is eating meals prepared outside of the home 4x month. Pt is drinking water, diet tea Brisk tea occasionally. Pt reports no emotional eating- still seeing therapist every 2 weeks, plus meds; noticed staying busy helped.            Physical Activity/Exercise  We talked about the importance of increasing daily physical activity and beginning to develop an exercise regimen/routine. We talked about exercise as being an important part of long term weight loss after surgery. Comments:  During class, I discussed with patient the importance of getting into an exercise routine. Pt is currently at home workouts 4x week; exercise with moving recently for activity. Pt has been encouraged to continue with current exercise. Behavior Modification     We talked about how to eat more mindfully. Tips and recommendations for how to make these changes were provided. Pt was encouraged to keep a food journal and record what they were taking in daily. Overall Assessment: Nutrition evaluation reveals important lifestyle changes being made. Goals set and recommendations made. Will continue to assess. Patient-Set Goals:   1. Nutrition - aim for 60g protein per day (track)  2. Exercise - continue with current exercise  3.  Behavior -eat protein first at meals; practice drinking miracle Finley, STEPHANIE  12/17/2021

## 2022-01-19 ENCOUNTER — CLINICAL SUPPORT (OUTPATIENT)
Dept: SURGERY | Age: 27
End: 2022-01-19

## 2022-01-19 DIAGNOSIS — E66.01 MORBID OBESITY (HCC): Primary | ICD-10-CM

## 2022-01-19 NOTE — PROGRESS NOTES
[address] [facility]  Supervised Weight Loss     Date:   2022    Patient's Name: Jonathon Mireles  : 1995    Insurance:  [place]          Session: 5 of  6  Surgery: Sleeve gastrectomy                      Surgeon: Dr. Marella Curling:  554     (AU new wt-used last months wt)                    BMI: 16             Pounds Lost since last month:  4              AEZMHK Gained since last month: 0     Starting Weight: 322 lbs                   Previous Months Weight: 317 lbs  Overall Pounds Lost: 5 lbs        Overall Pounds Gained: 0        Other Pertinent Information: Today's appointment was completed in a virtual setting d/t COVID-19.      Smoking Status:  None  Alcohol Intake: None    I have reviewed with pt the guidelines of the supervised wt loss program.  Pt understands the expectations of some wt loss during the program and that wt gain could delay the process. I have also explained that appointments need to be consecutive and missing an appointment may result in starting over. Pt has received this information in writing. Changes that patient has made since last month include:  None noted- had COVID and grandmother passed away- meeting with counselor weekly; practiced drinking rule. Eating Habits and Behaviors  A nutrition lesson specific to vitamins was provided. We discussed the various reasons for needing vitamins and different types and doses. General healthy eating guidelines were also discussed. Pts were instructed that their plate should be made up 1/2 plate coming from non-starchy vegetables, 1/4 coming from lean meat, and 1/4 of their plate coming from carbohydrates, including fruits, starches, or milk. We discussed measuring meals to 1/2 cup total per meal after surgery. Drinking only calorie-free, sugar-free and non-carbonated beverages.  We discussed the importance of drinking 64 ounces of fluid per day to prevent dehydration post-operatively. Patient's current diet habits include: Pt is eating 3 meals per day (B: eggs, sausage, turkey aviles, toast; L: rotisserie chicken sandwich, crackers or Keto chips; D: chicken, green beans, rice). Snack choices include none . Pt is eating refined carbohydrate foods (bread, pasta, rice, potatoes) daily. Pt is eating sweets/desserts rarely. Pt is using baking and air frying cooking methods. Pt is eating meals prepared outside of the home 1x week. Pt is drinking 4 oz juice, water, diet tea. Pt reports no emotional eating- continues to see therapist.          Physical Activity/Exercise  We talked about the importance of increasing daily physical activity and beginning to develop an exercise regimen/routine. We talked about exercise as being an important part of long term weight loss after surgery. Comments:  During class, I discussed with patient the importance of getting into an exercise routine. Pt is currently exercising 1-2x week due to having COVID. Pt has been encouraged to restart exercise routine. Behavior Modification     We talked about how to eat more mindfully and identify emotional eating triggers. Tips and recommendations for how to make these changes were provided. Pt was encouraged to keep a food journal and record what they were taking in daily. Overall Assessment: Nutrition evaluation reveals small lifestyle changes being made. Goals set and recommendations made. Will continue to assess.       Patient-Set Goals:   1. Nutrition - continue with 3 meals a day- protein at each meal  2. Exercise - exercise 5x week  3.  Behavior - continue drinking rule; only drink water (no drinks with artificial sweeteners); take daily vitamins    Gerardo Diop, STEPHANIE  1/19/2022

## 2022-02-18 ENCOUNTER — CLINICAL SUPPORT (OUTPATIENT)
Dept: SURGERY | Age: 27
End: 2022-02-18

## 2022-02-18 DIAGNOSIS — E66.01 MORBID OBESITY (HCC): Primary | ICD-10-CM

## 2022-02-18 NOTE — PROGRESS NOTES
[address] [facility]  Supervised Weight Loss     Date:   2022    Patient's Name: Berta Mccabe  : 1995    Insurance:  [place]          Session:   6  Surgery: Sleeve gastrectomy                      Surgeon: Dr. Shaw Hallmark:  481   (GA reported)                   BMI: 12             Pounds Lost since last month:  0              Pounds Gained since last month: 2 lbs     Starting Weight: 322 lbs                   Previous Months RULEVE: 352 BFD  Overall Pounds Lost: 3 lbs        Overall Pounds Gained: 0        Other Pertinent Information: Today's appointment was completed in a virtual setting d/t COVID-19. Smoking Status:  None  Alcohol Intake: None    I have reviewed with pt the guidelines of the supervised wt loss program.  Pt understands the expectations of some wt loss during the program and that wt gain could delay the process. I have also explained that appointments need to be consecutive and missing an appointment may result in starting over. Pt has received this information in writing. Changes that patient has made since last month include:  Exercising consistently; following drinking rule. Eating Habits and Behaviors  General healthy eating guidelines were also discussed. Pts were instructed that their plate should be made up 1/2 plate coming from non-starchy vegetables, 1/4 coming from lean meat, and 1/4 of their plate coming from carbohydrates, including fruits, starches, or milk. We discussed measuring meals to 1/2 cup total per meal after surgery. Drinking only calorie-free, sugar-free and non-carbonated beverages. We discussed the importance of drinking 64 ounces of fluid per day to prevent dehydration post-operatively. Physical Activity/Exercise  We talked about the importance of increasing daily physical activity and beginning to develop an exercise regimen/routine.  We talked about exercise as being an important part of long term weight loss after surgery. Comments:  During class, I discussed with patient the importance of getting into an exercise routine. Pt is currently exercising 4-5x week for activity. Pt has been encouraged to continue with current exercise. Behavior Modification    A behavior modification lesson was provided with an emphasis on developing mindful eating behaviors. We talked about how to eat more mindfully and identify emotional eating triggers. Tips and recommendations for how to make these changes were provided. Pt was encouraged to keep a food journal and record what they were taking in daily. Patient's reported eating behaviors: no emotional eating; limits snacking; working on chewing food well; eating at table. Overall Assessment:  Pt demonstrates appropriate lifestyle changes evidenced by reported changes and mostly weight maintenance over the past 6 months. Demonstrates understanding of nutrition guidelines for bariatric surgery. Appears to be an appropriate candidate at this time    Patient-Set Goals:   1. Nutrition - continue with 3 meals a day- protein at each meal  2. Exercise - exercise 5x week  3.  Behavior - no straws; attend support group    Anastasia Landaverde RD  2/18/2022

## 2022-03-01 ENCOUNTER — OFFICE VISIT (OUTPATIENT)
Dept: SURGERY | Age: 27
End: 2022-03-01
Payer: COMMERCIAL

## 2022-03-01 VITALS
DIASTOLIC BLOOD PRESSURE: 79 MMHG | TEMPERATURE: 98.7 F | BODY MASS INDEX: 50.02 KG/M2 | SYSTOLIC BLOOD PRESSURE: 135 MMHG | WEIGHT: 293 LBS | RESPIRATION RATE: 20 BRPM | HEIGHT: 64 IN | OXYGEN SATURATION: 98 % | HEART RATE: 78 BPM

## 2022-03-01 DIAGNOSIS — K21.9 GASTROESOPHAGEAL REFLUX DISEASE WITHOUT ESOPHAGITIS: ICD-10-CM

## 2022-03-01 DIAGNOSIS — E66.01 MORBID OBESITY (HCC): Primary | ICD-10-CM

## 2022-03-01 PROBLEM — E66.9 OBESITY: Status: RESOLVED | Noted: 2021-02-02 | Resolved: 2022-03-01

## 2022-03-01 PROCEDURE — 99213 OFFICE O/P EST LOW 20 MIN: CPT | Performed by: SURGERY

## 2022-03-01 RX ORDER — METFORMIN HYDROCHLORIDE 500 MG/1
TABLET ORAL 2 TIMES DAILY WITH MEALS
COMMUNITY
End: 2022-06-11

## 2022-03-01 RX ORDER — BUSPIRONE HYDROCHLORIDE 15 MG/1
15 TABLET ORAL 2 TIMES DAILY
COMMUNITY
Start: 2022-01-17 | End: 2022-09-08

## 2022-03-01 RX ORDER — TRAZODONE HYDROCHLORIDE 50 MG/1
TABLET ORAL
COMMUNITY
Start: 2022-01-17 | End: 2022-09-08

## 2022-03-01 NOTE — PROGRESS NOTES
1. Have you been to the ER, urgent care clinic since your last visit? Hospitalized since your last visit? No    2. Have you seen or consulted any other health care providers outside of the [address] [place] [address] since your last visit? Include any pap smears or colon screening.  No

## 2022-03-02 NOTE — PATIENT INSTRUCTIONS
Learning About How to Prepare for Weight-Loss (Bariatric) Surgery  How can you prepare for weight-loss surgery? Having weight-loss surgery is a big step. You can prepare for surgery by having a plan. Your plan may include your goals for losing weight and how to make changes in your diet, activity, and lifestyle to help raise your chances of success. One way to prepare for surgery is to think about your goal or reason why you want to reach a healthy weight. Do you want to lower your blood pressure, cholesterol, or blood sugar? Do you want to be able to sleep better, play with your kids, or walk around the block? Having a reason can help you stay with your plan and meet your goals. You'll have a weight loss team that you can talk to about your plans and goals. The team will help you get ready for surgery. After surgery, the team will help you adjust to new ways of eating and changes to your body. How will weight-loss surgery affect your life? You have likely thought a lot about how surgery may affect your life--how you will eat, how your body will look, or how you will feel. You probably will lose weight very quickly in the first few months after surgery. As time goes on, your weight loss will slow down. How much weight you lose depends on what type of surgery you had and how well your new eating and activity plans are working for you. There will be many changes. These may include:  A new way of eating. Success in reaching and keeping a healthy weight depends on making lifelong changes in how you eat. After surgery, you raise your chances of success if you:  · Eat just a few ounces of food at a time. · Eat very slowly and chew your food to mush. · Don't drink for 30 minutes before you eat, during your meal, and for 30 minutes after you eat. · Are careful about drinking alcohol. · Avoid foods that are high in fat or sugar. · Take vitamin and mineral supplements. A healthier you.    Weight-loss surgery can have some real health benefits. Problems like diabetes, high blood pressure, and sleep apnea may go away--or at least become easier to manage. A more active you. After surgery, being active on most days of the week will help you reach your weight goal and avoid gaining back the weight you lose. A lot of extra skin. When you lose weight quickly, you may have a lot of extra skin. That's normal. You can have surgery to remove the extra skin if it bothers you. There are going to be some ups and downs while you get used to these changes. So another way to adjust is to identify who can help support you. Getting support from friends and family can help. And joining a support group for people who have had the surgery can be a big help too, because they know what you're going through. Another way you can help yourself adjust to changes is to have a plan. When you have a plan, you can focus on losing weight and living a healthier life. So what steps can you take to prepare for weight-loss surgery? Will you set some goals? Will you learn about how surgery can affect your life? How about asking family or friends for help? Write out your plan. Then get ready. Where can you learn more? Go to http://www.gray.com/  Enter C413 in the search box to learn more about \"Learning About How to Prepare for Weight-Loss (Bariatric) Surgery. \"  Current as of: March 17, 2021               Content Version: 13.0  © 8687-9903 Healthwise, Incorporated. Care instructions adapted under license by [employer] (which disclaims liability or warranty for this information). If you have questions about a medical condition or this instruction, always ask your healthcare professional. [place] [address] any warranty or liability for your use of this information.

## 2022-03-02 NOTE — PROGRESS NOTES
Subjective: The patient is a 27 y.o. obese female seeking approval for laparoscopic bariatric surgery. Body mass index is 54.76 kg/m². Jacob Mcfadden has tried multiple diets in her  lifetime most recently trying unsupervised diets during which she was able to lose small amounts of weight. She is walking for exercise, avoiding liquid calories, avoiding fried/fatty foods. Bariatric comorbidities present are   Past Medical History:   Diagnosis Date    Abnormal Papanicolaou smear of cervix     Abnormality in fetal heart rate and rhythm complicating labor and delivery 2/3/2021    Asthma     Depression     Psychiatric problem     depression       Patient Active Problem List    Diagnosis Date Noted    Bacteremia 2021    H/O  section 2021    Abnormality in fetal heart rate and rhythm complicating labor and delivery 2021    Streptococcus B carrier state complicating childbirth     Intrauterine growth restriction, antepartum, third trimester, not applicable or unspecified fetus 2021    Body mass index 50.0-59.9, adult ([facility] [address] 2021    39 weeks gestation of pregnancy 2021    Pregnancy 2021     Past Medical History:   Diagnosis Date    Abnormal Papanicolaou smear of cervix     Abnormality in fetal heart rate and rhythm complicating labor and delivery 2/3/2021    Asthma     Depression     Psychiatric problem     depression      Past Surgical History:   Procedure Laterality Date    HX  SECTION        Social History     Tobacco Use    Smoking status: Never Smoker    Smokeless tobacco: Never Used   Substance Use Topics    Alcohol use: Yes     Comment: rarely      Family History   Problem Relation Age of Onset    Diabetes Mother     Hypertension Mother       Prior to Admission medications    Medication Sig Start Date End Date Taking?  Authorizing Provider   busPIRone (BUSPAR) 15 mg tablet TAKE 1/3 TABLET UP TO THREE TIMES DAILY AS DIRECTED FOR ANXIETY 1/17/22  Yes Provider, Historical   traZODone (DESYREL) 50 mg tablet TAKE HALF TO ONE TABLET AT BEDTIME AS NEEDED FOR SLEEP 1/17/22  Yes Provider, Historical   metFORMIN (GLUCOPHAGE) 500 mg tablet Take  by mouth two (2) times daily (with meals). Yes Provider, Historical   buPROPion XL (WELLBUTRIN XL) 300 mg XL tablet TAKE 1 TABLET (300 MG) BY ORAL ROUTE ONCE DAILY FOR 90 DAYS 8/9/21  Yes Provider, Historical   famotidine (PEPCID) 20 mg tablet Take 20 mg by mouth two (2) times a day. 8/7/21  Yes Provider, Historical   montelukast (SINGULAIR) 10 mg tablet  8/30/21  Yes Provider, Historical   hydrOXYzine HCL (ATARAX) 25 mg tablet TAKE 2 TABLETS BY MOUTH IN THE EVENING 8/9/21  Yes Provider, Historical   cholecalciferol, vitamin D3, (Vitamin D3) 100 mcg (4,000 unit) cap Take  by mouth. Yes Provider, Historical     No Known Allergies        Objective:     Visit Vitals  /79   Pulse 78   Temp 98.7 °F (37.1 °C)   Resp 20   Ht 5' 4\" (1.626 m)   Wt 319 lb (144.7 kg)   SpO2 98%   BMI 54.76 kg/m²       Physical Exam:  GENERAL: alert, cooperative, no distress, appears stated age, morbidly obese    Data Review: Upper gastrointestinal series: No hiatal hernia or esophageal dysmotility. Assessment:     Morbid obesity with comorbidity; no success with medical management. She has completed all preoperative prerequisites and has been found to be a suitable candidate for weight reduction surgery. Although she initially considered sleeve gastrectomy as her procedure of choice, she now desires to seek preauthorization for laparoscopic gastric bypass. Given BMI greater than 48, young age, I consider this the best option. Plan:     Continue low calorie, low carbohydrate diet. Obtain gym membership, begin structured exercise program as discussed. Additional dietitian encounter to discuss necessary vitamin supplementation, protein goals for gastric bypass patients.   We will submit for preauthorization for laparoscopic gastric bypass with upper endoscopy. 20 minutes spent with patient (greater than 50% of time in face-face consultation reviewing preoperative work-up, choice of procedure, anticipated hospital course, postoperative diet, need for additional dietitian teaching regarding vitamins).       Signed By: Valentine Ghotra MD     March 1, 2022

## 2022-03-03 ENCOUNTER — DOCUMENTATION ONLY (OUTPATIENT)
Dept: SURGERY | Age: 27
End: 2022-03-03

## 2022-03-03 NOTE — PROGRESS NOTES
Submitted clinicals to [employer] via online (availity) for Gastric Bypass pre auth for Dr Beba Hester.      Tracking # [id number]     RF# [id number]

## 2022-03-04 ENCOUNTER — CLINICAL SUPPORT (OUTPATIENT)
Dept: SURGERY | Age: 27
End: 2022-03-04

## 2022-03-04 DIAGNOSIS — E66.01 MORBID OBESITY (HCC): Primary | ICD-10-CM

## 2022-03-04 NOTE — PROGRESS NOTES
Pre-operative Bariatric Nutrition Visit    After meeting with surgeon pt has decided to have gastric bypass versus sleeve gastrectomy . Reviewed vitamin recommendations and dumping syndrome. Pt expressed understanding. If further details are desired please feel free to contact me at [phone]. This phone number was also provided to the patient for any further questions or concerns.            Melina Harden RD
Patient

## 2022-03-16 ENCOUNTER — DOCUMENTATION ONLY (OUTPATIENT)
Dept: SURGERY | Age: 27
End: 2022-03-16

## 2022-03-16 NOTE — PROGRESS NOTES
Submitted clinicals to secondary insurance [employer] via fax to  for Gastric Bypass pre auth for Dr Puneet Shah.

## 2022-03-19 PROBLEM — R78.81 BACTEREMIA: Status: ACTIVE | Noted: 2021-02-12

## 2022-03-19 PROBLEM — O36.5930 INTRAUTERINE GROWTH RESTRICTION, ANTEPARTUM, THIRD TRIMESTER, NOT APPLICABLE OR UNSPECIFIED FETUS: Status: ACTIVE | Noted: 2021-02-02

## 2022-03-19 PROBLEM — Z3A.39 39 WEEKS GESTATION OF PREGNANCY: Status: ACTIVE | Noted: 2021-02-02

## 2022-03-19 PROBLEM — Z34.90 PREGNANCY: Status: ACTIVE | Noted: 2021-02-01

## 2022-03-19 PROBLEM — Z98.891 H/O CESAREAN SECTION: Status: ACTIVE | Noted: 2021-02-12

## 2022-03-25 ENCOUNTER — DOCUMENTATION ONLY (OUTPATIENT)
Dept: SURGERY | Age: 27
End: 2022-03-25

## 2022-03-25 NOTE — PROGRESS NOTES
Submitted clinicals to [facility] again via fax to [phone] per patients request for Gastric Bypass pre auth for Dr Elie Kennedy.

## 2022-04-07 ENCOUNTER — TELEPHONE (OUTPATIENT)
Dept: SURGERY | Age: 27
End: 2022-04-07

## 2022-05-12 ENCOUNTER — HOSPITAL ENCOUNTER (OUTPATIENT)
Dept: GENERAL RADIOLOGY | Age: 27
Discharge: HOME | End: 2022-05-12
Attending: SURGERY
Payer: COMMERCIAL

## 2022-05-12 ENCOUNTER — HOSPITAL ENCOUNTER (OUTPATIENT)
Dept: PREADMISSION TESTING | Age: 27
Discharge: HOME | End: 2022-05-12
Payer: COMMERCIAL

## 2022-05-12 VITALS
BODY MASS INDEX: 50.02 KG/M2 | WEIGHT: 293 LBS | DIASTOLIC BLOOD PRESSURE: 81 MMHG | HEIGHT: 64 IN | HEART RATE: 86 BPM | SYSTOLIC BLOOD PRESSURE: 137 MMHG | TEMPERATURE: 98.1 F | RESPIRATION RATE: 18 BRPM

## 2022-05-12 LAB
25(OH)D3 SERPL-MCNC: 17.6 NG/ML (ref 30–100)
ALBUMIN SERPL-MCNC: 3.2 G/DL (ref 3.5–5)
ALBUMIN/GLOB SERPL: 0.8 {RATIO} (ref 1.1–2.2)
ALP SERPL-CCNC: 108 U/L (ref 45–117)
ALT SERPL-CCNC: 22 U/L (ref 12–78)
ANION GAP SERPL CALC-SCNC: 4 MMOL/L (ref 5–15)
APPEARANCE UR: ABNORMAL
AST SERPL-CCNC: 17 U/L (ref 15–37)
BACTERIA URNS QL MICRO: ABNORMAL /HPF
BASOPHILS # BLD: 0.1 K/UL (ref 0–0.1)
BASOPHILS NFR BLD: 1 % (ref 0–1)
BILIRUB SERPL-MCNC: 0.4 MG/DL (ref 0.2–1)
BILIRUB UR QL: NEGATIVE
BUN SERPL-MCNC: 7 MG/DL (ref 6–20)
BUN/CREAT SERPL: 9 (ref 12–20)
CALCIUM SERPL-MCNC: 8.9 MG/DL (ref 8.5–10.1)
CHLORIDE SERPL-SCNC: 109 MMOL/L (ref 97–108)
CHOLEST SERPL-MCNC: 144 MG/DL
CO2 SERPL-SCNC: 24 MMOL/L (ref 21–32)
COLOR UR: ABNORMAL
CREAT SERPL-MCNC: 0.79 MG/DL (ref 0.55–1.02)
DIFFERENTIAL METHOD BLD: ABNORMAL
EOSINOPHIL # BLD: 0.2 K/UL (ref 0–0.4)
EOSINOPHIL NFR BLD: 2 % (ref 0–7)
EPITH CASTS URNS QL MICRO: ABNORMAL /LPF
ERYTHROCYTE [DISTWIDTH] IN BLOOD BY AUTOMATED COUNT: 15.9 % (ref 11.5–14.5)
FOLATE SERPL-MCNC: 11 NG/ML (ref 5–21)
GLOBULIN SER CALC-MCNC: 3.9 G/DL (ref 2–4)
GLUCOSE SERPL-MCNC: 98 MG/DL (ref 65–100)
GLUCOSE UR STRIP.AUTO-MCNC: NEGATIVE MG/DL
HCT VFR BLD AUTO: 40.1 % (ref 35–47)
HGB BLD-MCNC: 12.4 G/DL (ref 11.5–16)
HGB UR QL STRIP: NEGATIVE
IMM GRANULOCYTES # BLD AUTO: 0 K/UL (ref 0–0.04)
IMM GRANULOCYTES NFR BLD AUTO: 0 % (ref 0–0.5)
IRON SATN MFR SERPL: 9 % (ref 20–50)
IRON SERPL-MCNC: 26 UG/DL (ref 35–150)
KETONES UR QL STRIP.AUTO: ABNORMAL MG/DL
LEUKOCYTE ESTERASE UR QL STRIP.AUTO: NEGATIVE
LYMPHOCYTES # BLD: 2.4 K/UL (ref 0.8–3.5)
LYMPHOCYTES NFR BLD: 32 % (ref 12–49)
MAGNESIUM SERPL-MCNC: 2.3 MG/DL (ref 1.6–2.4)
MCH RBC QN AUTO: 25.5 PG (ref 26–34)
MCHC RBC AUTO-ENTMCNC: 30.9 G/DL (ref 30–36.5)
MCV RBC AUTO: 82.5 FL (ref 80–99)
MONOCYTES # BLD: 0.5 K/UL (ref 0–1)
MONOCYTES NFR BLD: 6 % (ref 5–13)
NEUTS SEG # BLD: 4.3 K/UL (ref 1.8–8)
NEUTS SEG NFR BLD: 59 % (ref 32–75)
NITRITE UR QL STRIP.AUTO: NEGATIVE
NRBC # BLD: 0 K/UL (ref 0–0.01)
NRBC BLD-RTO: 0 PER 100 WBC
PH UR STRIP: 5 [PH] (ref 5–8)
PLATELET # BLD AUTO: 227 K/UL (ref 150–400)
PMV BLD AUTO: 11.8 FL (ref 8.9–12.9)
POTASSIUM SERPL-SCNC: 4 MMOL/L (ref 3.5–5.1)
PROT SERPL-MCNC: 7.1 G/DL (ref 6.4–8.2)
PROT UR STRIP-MCNC: NEGATIVE MG/DL
RBC # BLD AUTO: 4.86 M/UL (ref 3.8–5.2)
RBC #/AREA URNS HPF: ABNORMAL /HPF (ref 0–5)
SODIUM SERPL-SCNC: 137 MMOL/L (ref 136–145)
SP GR UR REFRACTOMETRY: 1.02 (ref 1–1.03)
T4 FREE SERPL-MCNC: 1.2 NG/DL (ref 0.8–1.5)
TIBC SERPL-MCNC: 297 UG/DL (ref 250–450)
TSH SERPL DL<=0.05 MIU/L-ACNC: 2.06 UIU/ML (ref 0.36–3.74)
UA: UC IF INDICATED,UAUC: ABNORMAL
UROBILINOGEN UR QL STRIP.AUTO: 0.2 EU/DL (ref 0.2–1)
VIT B12 SERPL-MCNC: 1031 PG/ML (ref 193–986)
WBC # BLD AUTO: 7.4 K/UL (ref 3.6–11)
WBC URNS QL MICRO: ABNORMAL /HPF (ref 0–4)

## 2022-05-12 PROCEDURE — 84446 ASSAY OF VITAMIN E: CPT

## 2022-05-12 PROCEDURE — 84443 ASSAY THYROID STIM HORMONE: CPT

## 2022-05-12 PROCEDURE — 85025 COMPLETE CBC W/AUTO DIFF WBC: CPT

## 2022-05-12 PROCEDURE — 84425 ASSAY OF VITAMIN B-1: CPT

## 2022-05-12 PROCEDURE — 82306 VITAMIN D 25 HYDROXY: CPT

## 2022-05-12 PROCEDURE — 84439 ASSAY OF FREE THYROXINE: CPT

## 2022-05-12 PROCEDURE — 84630 ASSAY OF ZINC: CPT

## 2022-05-12 PROCEDURE — 82525 ASSAY OF COPPER: CPT

## 2022-05-12 PROCEDURE — 84590 ASSAY OF VITAMIN A: CPT

## 2022-05-12 PROCEDURE — 80053 COMPREHEN METABOLIC PANEL: CPT

## 2022-05-12 PROCEDURE — 82607 VITAMIN B-12: CPT

## 2022-05-12 PROCEDURE — 82746 ASSAY OF FOLIC ACID SERUM: CPT

## 2022-05-12 PROCEDURE — 81001 URINALYSIS AUTO W/SCOPE: CPT

## 2022-05-12 PROCEDURE — 82465 ASSAY BLD/SERUM CHOLESTEROL: CPT

## 2022-05-12 PROCEDURE — 83735 ASSAY OF MAGNESIUM: CPT

## 2022-05-12 PROCEDURE — 83540 ASSAY OF IRON: CPT

## 2022-05-12 PROCEDURE — 93005 ELECTROCARDIOGRAM TRACING: CPT

## 2022-05-12 PROCEDURE — 36415 COLL VENOUS BLD VENIPUNCTURE: CPT

## 2022-05-12 PROCEDURE — 71046 X-RAY EXAM CHEST 2 VIEWS: CPT

## 2022-05-12 RX ORDER — PHENTERMINE HYDROCHLORIDE 37.5 MG/1
37.5 CAPSULE ORAL
COMMUNITY
End: 2022-06-11

## 2022-05-12 NOTE — PERIOP NOTES
PREOP INSTRUCTIONS AND MEDICATIONS PRINTED AND REVIEWED WITH PATIENT. TWO BOTTLES OF CHG SOAP GIVEN    COPY OF COVID CARD ON CHART    PATIENT GIVEN WRITTEN INSTRUCTIONS TO GO TO THE IMAGING CENTER/CANCER CENTER [address] [facility] [address] TO HAVE CHEST XRAY COMPLETED.

## 2022-05-12 NOTE — PERIOP NOTES
[facility]'S  BARIATRIC PREOPERATIVE INSTRUCTIONS    Surgery Date:   06-    [facility] staff will call you between 4 PM- 8 PM the day before surgery with your arrival time. If your surgery is on a Monday, we will call you the preceding Friday. Please call [phone] after 8 PM if you did not receive your arrival time. 1. Please report to [facility] Patient Access/Admitting on the 1st floor. Bring your insurance card, photo identification, and any copayment ( if applicable). 2. If you are going home the same day of your surgery, you must have a responsible adult to drive you home. You need to have a responsible adult to stay with you the first 24 hours after surgery and you should not drive a car for 24 hours following your surgery. 3. Do NOT eat any solid foods after midnight the night before surgery including candy, mint or gum. You may drink clear liquids from midnight until 1 hour prior to your arrival. You may drink up to 12 ounces at one time every 4 hours. Please note special instructions, if applicable, below for medications. 4. Do NOT drink alcohol or smoke 24 hours before surgery. STOP smoking for 14 days prior as it helps with breathing and healing after surgery. 5. If you are being admitted to the hospital, please leave personal belongings/luggage in your car until you have an assigned hospital room number. 6. Please wear comfortable clothes. Wear your glasses instead of contacts. We ask that all money, jewelry and valuables be left at home. Wear no make up, particularly mascara, the day of surgery. 7.  All body piercings, rings, and jewelry need to be removed and left at home. Please remove any nail polish or artifical nails from your fingernails. Please wear your hair loose or down. Please no pony-tails, buns, or any metal hair accessories. If you shower the morning of surgery, please do not apply any lotions or powders afterwards. You may wear deodorant.   Do not shave any body area within 24 hours of your surgery. 8. Please follow all instructions to avoid any potential surgical cancellation. 9. Should your physical condition change, (i.e. fever, cold, flu, etc.) please notify your surgeon as soon as possible. 10. It is important to be on time. If a situation occurs where you may be delayed, please call:  [phone] / [phone] on the day of surgery. 11. The Preadmission Testing staff can be reached at [phone]. 12. Special instructions: NONE      Current Outpatient Medications   Medication Sig    phentermine 37.5 mg capsule Take 37.5 mg by mouth every morning.  busPIRone (BUSPAR) 15 mg tablet 15 mg two (2) times a day.  traZODone (DESYREL) 50 mg tablet TAKE HALF TO ONE TABLET AT BEDTIME AS NEEDED FOR SLEEP    metFORMIN (GLUCOPHAGE) 500 mg tablet Take  by mouth two (2) times daily (with meals).  buPROPion XL (WELLBUTRIN XL) 300 mg XL tablet TAKE 1 TABLET (300 MG) BY ORAL ROUTE ONCE DAILY FOR 90 DAYS    famotidine (PEPCID) 20 mg tablet Take 20 mg by mouth two (2) times a day.  montelukast (SINGULAIR) 10 mg tablet     cholecalciferol, vitamin D3, (Vitamin D3) 100 mcg (4,000 unit) cap Take  by mouth.  hydrOXYzine HCL (ATARAX) 25 mg tablet 25 mg three (3) times daily. No current facility-administered medications for this encounter. 1. YOU MUST ONLY TAKE THESE MEDICATIONS THE MORNING OF SURGERY WITH A SIP OF WATER: FAMOTIDINE, HYRDOXYZINE,BUSPIRONE, MONTELUKAST  2. MEDICATIONS TO TAKE THE MORNING OF SURGERY ONLY IF NEEDED: NONE  3. HOLD these prescription medications BEFORE Surgery: NONE  4. Ask your surgeon/prescribing physician about when/if to STOP taking these medications: NONE  5. Stop all vitamins, herbal medicines, Aspirin containing products and any non-steroidal anti-inflammatory drugs (i.e. Ibuprofen, Naproxen, Advil, Aleve) 7 days prior to surgery. You may take Tylenol.     6. If you are currently taking Plavix, Coumadin, or any other blood-thinning/anticoagulant medication contact your prescribing physician for instructions. Preventing Infections Before and After - Your Surgery    IMPORTANT INSTRUCTIONS     You play an important role in your health and preparation for surgery. To reduce the germs on your skin you will need to shower with CHG soap (Chorhexidine gluconate 4%) two times before surgery. CHG soap (Hibiclens, Hex-A-Clens or store brand)   CHG soap will be provided at your Preadmission Testing (PAT) appointment.  If you do not have a PAT appointment before surgery, you may arrange to  CHG soap from our office or purchase CHG soap at a pharmacy, grocery or department store.  You need to purchase TWO 4 ounce bottles to use for your 2 showers. Steps to follow:  1. Wash your hair with your normal shampoo and your body with regular soap and rinse well to remove shampoo and soap from your skin. 2. Wet a clean washcloth and turn off the shower. 3. Put CHG soap on washcloth and apply to your entire body from the neck down. Do not use on your head, face or private parts(genitals). Do not use CHG soap on open sores, wounds or areas of skin irritation. 4. Wash you body gently for 5 minutes. Do not wash your skin too hard. This soap does not create lather. Pay special attention to your underarms and from your belly button to your feet. 5. Turn the shower back on and rinse well to get CHG soap off your body. 6. Pat your skin dry with a clean, dry towel. Do not apply lotions or moisturizer. 7. Put on clean clothes and sleep on fresh bed sheets and do not allow pets to sleep with you. Shower with CHG soap 2 times before your surgery   The evening before your surgery   The morning of your surgery      Tips to help prevent infections after your surgery:  1. Protect your surgical wound from germs:  ? Hand washing is the most important thing you and your caregivers can do to prevent infections. ?  Keep your bandage clean and dry! ? Do not touch your surgical wound. 2. Use clean, freshly washed towels and washcloths every time you shower; do not share bath linens with others. 3. Until your surgical wound is healed, wear clothing and sleep on bed linens each day that are clean and freshly washed. 4. Do not allow pets to sleep in your bed with you or touch your surgical wound. 5. Do not smoke - smoking delays wound healing. This may be a good time to stop smoking. 6. If you have diabetes, it is important for you to manage your blood sugar levels properly before your surgery as well as after your surgery. Poorly managed blood sugar levels slow down wound healing and prevent you from healing completely. Eating and Drinking Before Bariatric Surgery     You may eat a regular dinner at the usual time on the day before your surgery.  Do NOT eat any solid foods after midnight.  You may drink clear liquids only from 12 midnight until 1 hours prior to your arrival time at the hospital on the day of your surgery. Do NOT drink alcohol.  Clear liquids include:  o Water  o Flavored, sugar-free water  o Crystal Light, [place] or sugar free generic  o Sugar-free Juan-Aid or generic  o Decaffeinated tea or coffee  o Vitamin Water Zero  o Powerade Zero  o Gatorade Zero  o Clear Protein drinks  o Diet V-8 Splash  o Diet Snapple  o Diet Lemonade   You may drink up to 12 ounces at one time every 4 hours between the hours of midnight and 1 hour before your arrival time at the hospital. Example- if your arrival time at the hospital is 6am, you may drink 12 ounces of clear liquids no later than 5am.   If you have any questions, please contact your surgeon's office. Patient Information Regarding COVID Restrictions      Day of Procedure     Please park in the parking deck or any designated visitor parking lot.    Enter the facility through the Main Entrance of the hospital.   On the day of surgery, please provide the cell phone number of the person who will be waiting for you to the Patient Access representative at the time of registration.  Please wear a mask on the day of your procedure.  We are now allowing two designated visitors per stay. Pediatric patients may have 2 designated visitors. These two people may come in with you on the day of your procedure.  No visitors under the age of 13.  The designated visitor must also wear a mask.  Once your procedure and the immediate recovery period is completed, a nurse in the recovery area will contact your designated visitor to inform them of your room number or to otherwise review other pertinent information regarding your care.  Social distancing practices are to be adhered to in waiting areas and the cafeteria. The patient was contacted in person. She verbalized understanding of all instructions does not need reinforcement.

## 2022-05-13 ENCOUNTER — DOCUMENTATION ONLY (OUTPATIENT)
Dept: SURGERY | Age: 27
End: 2022-05-13

## 2022-05-13 LAB
ATRIAL RATE: 61 BPM
CALCULATED P AXIS, ECG09: 59 DEGREES
CALCULATED R AXIS, ECG10: 27 DEGREES
CALCULATED T AXIS, ECG11: 24 DEGREES
DIAGNOSIS, 93000: NORMAL
P-R INTERVAL, ECG05: 168 MS
Q-T INTERVAL, ECG07: 428 MS
QRS DURATION, ECG06: 88 MS
QTC CALCULATION (BEZET), ECG08: 430 MS
VENTRICULAR RATE, ECG03: 61 BPM

## 2022-05-13 NOTE — PERIOP NOTES
Note sent via cc to [facility] at Dr. Ramos [place] office as follows:    Please note abnormal labs drawn in PAT:    Iron 26  Vit b12 1031  Vit D 17.6

## 2022-05-13 NOTE — PROGRESS NOTES
Please note abnormal labs drawn in PAT:     Iron 26   Vit b12 1031   Vit D 17.6     Thank you,     Julissa Richardson RN   [phone]       Moy Sevilla NP made aware of labs.

## 2022-05-16 LAB — ZINC BLD-MCNC: 491 UG/DL (ref 440–860)

## 2022-05-17 ENCOUNTER — OFFICE VISIT (OUTPATIENT)
Dept: SURGERY | Age: 27
End: 2022-05-17

## 2022-05-17 VITALS — BODY MASS INDEX: 50.02 KG/M2 | WEIGHT: 293 LBS | HEIGHT: 64 IN

## 2022-05-17 DIAGNOSIS — Z98.890 POST-OPERATIVE NAUSEA AND VOMITING: ICD-10-CM

## 2022-05-17 DIAGNOSIS — R11.2 POST-OPERATIVE NAUSEA AND VOMITING: ICD-10-CM

## 2022-05-17 DIAGNOSIS — E61.1 IRON DEFICIENCY: ICD-10-CM

## 2022-05-17 DIAGNOSIS — G89.18 POST-OP PAIN: ICD-10-CM

## 2022-05-17 DIAGNOSIS — E55.9 VITAMIN D DEFICIENCY: ICD-10-CM

## 2022-05-17 DIAGNOSIS — E66.01 MORBID OBESITY WITH BMI OF 50.0-59.9, ADULT (HCC): Primary | ICD-10-CM

## 2022-05-17 DIAGNOSIS — L50.1 IDIOPATHIC URTICARIA: ICD-10-CM

## 2022-05-17 PROBLEM — Z3A.39 39 WEEKS GESTATION OF PREGNANCY: Status: RESOLVED | Noted: 2021-02-02 | Resolved: 2022-05-17

## 2022-05-17 PROBLEM — O36.5930 INTRAUTERINE GROWTH RESTRICTION, ANTEPARTUM, THIRD TRIMESTER, NOT APPLICABLE OR UNSPECIFIED FETUS: Status: RESOLVED | Noted: 2021-02-02 | Resolved: 2022-05-17

## 2022-05-17 PROBLEM — Z34.90 PREGNANCY: Status: RESOLVED | Noted: 2021-02-01 | Resolved: 2022-05-17

## 2022-05-17 PROBLEM — R78.81 BACTEREMIA: Status: RESOLVED | Noted: 2021-02-12 | Resolved: 2022-05-17

## 2022-05-17 RX ORDER — ERGOCALCIFEROL 1.25 MG/1
50000 CAPSULE ORAL
Qty: 5 CAPSULE | Refills: 3 | Status: SHIPPED | OUTPATIENT
Start: 2022-05-17

## 2022-05-17 RX ORDER — GABAPENTIN 100 MG/1
100-200 CAPSULE ORAL
Qty: 30 CAPSULE | Refills: 0 | Status: SHIPPED | OUTPATIENT
Start: 2022-05-17 | End: 2022-05-22

## 2022-05-17 RX ORDER — ENOXAPARIN SODIUM 100 MG/ML
40 INJECTION SUBCUTANEOUS DAILY
Qty: 14 EACH | Refills: 0 | Status: SHIPPED | OUTPATIENT
Start: 2022-05-17 | End: 2022-07-14 | Stop reason: ALTCHOICE

## 2022-05-17 RX ORDER — OMEPRAZOLE 40 MG/1
40 CAPSULE, DELAYED RELEASE ORAL DAILY
Qty: 30 CAPSULE | Refills: 1 | Status: SHIPPED | OUTPATIENT
Start: 2022-05-17 | End: 2022-06-09

## 2022-05-17 RX ORDER — ONDANSETRON 4 MG/1
4 TABLET, ORALLY DISINTEGRATING ORAL
Qty: 20 TABLET | Refills: 0 | Status: SHIPPED | OUTPATIENT
Start: 2022-05-17 | End: 2022-09-08 | Stop reason: ALTCHOICE

## 2022-05-17 RX ORDER — POLYETHYLENE GLYCOL 3350 17 G/17G
17 POWDER, FOR SOLUTION ORAL DAILY
Qty: 90 PACKET | Refills: 3 | Status: SHIPPED | OUTPATIENT
Start: 2022-05-17 | End: 2022-06-23

## 2022-05-17 NOTE — PATIENT INSTRUCTIONS
Start the pre op diet tomorrow, 5/18/22 with a goal of at least 10 lbs weight loss or surgery will be postponed     Talk to your GYN about reliable birth control (like an IUD). In the interim use condoms or abstinence. Three Week Pre-Operative Liver Shrinking Diet START 5/18/22    This type of diet has been shown to help shrink the liver, which makes surgery safer and easier. The liver shrinking diet is only intended to be used before surgery and only for those patients having surgery. Specifics:   Start 3 weeks prior to surgery or as directed by your surgeon or NP   Vitamins:  Take Multivitamin and Calcium 500 mg daily   Drink at least 64 ounces of sugar free, calorie free, non-carbonated fluid daily   Replace 2 meals per day with a meal replacement shake or bar   Third Sensible meal should consist of ½ cup of lean protein and unlimited non-starchy vegetables   You may have 1-2 snacks from the approved list at any time of the day  Fluids:   Water   Crystal Light, [place]   Diet Snapple (non-calorie only)   Fruit 2.0, [place], Vitamin Water Zero   Decaf coffee or tea, no cream, sugar substitutes are acceptable   Fat Free Broth   Sugar Free Popsicles       Meal Replacement Shakes:   Replace 2 meals per day with a meal replacement shake or bar   Meal replacement bars are only acceptable on the liver shrinking diet   Products can be found at [employer] or online in Ready to Drink (RTD) or powder form   Powders may be mixed with 8 oz. skim (fat free) or 1% milk     Nutrient Guidelines for Meal Replacement Shakes or Bars:     Calories:  150-200 calories   Protein:  12-30 grams    Carbohydrate:  12-25 grams    The 3rd (Sensible) Meal:     Lean Protein:  o 4 oz.  or ½ cup  o Broiled, baked, grilled, poached, steamed  o Examples:  Lean deli meats (turkey, chicken, ham, roast beef), canned chicken, canned tuna, canned salmon, boneless/skinless chicken, any type of seafood.  Vegetables:  o Fresh, frozen, or canned. o Vegetables can be unlimited throughout the day.  o Microwaved, baked, roasted, grilled, steamed  o Avoid starchy vegetables such as corn, peas, potatoes, sweet potatoes, lima beans     Snacks:  o One or two of these snack options daily   o 4-6 oz. fat free or low fat yogurt, light Greek yogurt  o Fat free or low fat cottage cheese  o ½ cup or 1 small piece of fruit. Fresh, frozen, or canned (no sugar added).  Cooking Tips  o No added fat or oil. Fat-free, non-stick cooking spray is acceptable.  o May add flavor with dry herbs & spices, salt & pepper, lemon/ lime juice, vinegar (balsamic, cider, white wine, rice wine), soy sauce, salsa, hot sauce, spray butter          A Special Note for Individuals with Diabetes    The amount of total carbohydrate provided by the pre-op diet will be less than your normal intake. If you take diabetes medications or insulin, you are at risk of experiencing hypoglycemia (low blood sugar).  Contact your physician to help adjust your diabetes medications while on this diet   Check your blood sugar at least twice a day   Each of the following items provides 15 grams of carbohydrate and may be used to resolve low blood sugar  o 4 oz. ( ½ cup) 100% fruit juice  o 8 oz. (1 cup) fat free milk  o 3 or 4 glucose tablets (use tablets after gastric bypass to treat hypoglycemia related to dumping syndrome)  o 1 tbsp.  of regular jelly, sugar, or honey        Day Before Surgery:   -  take (2) Extra Strength Tylenol (acetaminophen) at noon and 8 pm    -  you may drink sugar free clear liquids until 3 hours prior to surgery      your after surgery prescriptions BEFORE surgery     Make your 2, 4 and 6 week appointments for after surgery

## 2022-05-17 NOTE — PROGRESS NOTES
1. Have you been to the ER, urgent care clinic since your last visit? Hospitalized since your last visit? No    2. Have you seen or consulted any other health care providers outside of the [address] [place] [address] since your last visit? Include any pap smears or colon screening.  No

## 2022-05-17 NOTE — Clinical Note
She is seeing you tomorrow. Some weight gain she attributes to prn steroids for idiopathic urticaria. Started her on the pre op diet (3 weeks) and told her 10 lbs weight loss or surgery would be postponed and she agreed.  ARCHIE

## 2022-05-17 NOTE — PROGRESS NOTES
I was in the office while conducting this encounter. Consent:  She and/or her healthcare decision maker is aware that this patient-initiated Telehealth encounter is a billable service, with coverage as determined by her insurance carrier. She is aware that she may receive a bill and has provided verbal consent to proceed: No - Not billable    This virtual visit was conducted via [employer]. Pursuant to the emergency declaration under the [facility], [facility] waiver authority and the [employer] Act, this Virtual  Visit was conducted to reduce the patient's risk of exposure to COVID-19 and provide continuity of care for an established patient. Services were provided through a video synchronous discussion virtually to substitute for in-person clinic visit. Due to this being a TeleHealth evaluation, many elements of the physical examination are unable to be assessed. Total Time: minutes: 21-30 minutes. Chief Complaint   Patient presents with    Weight Loss     [address], [phone] ([place])       Alyson Guaman presents today for presurgical visit in preparation for weight loss surgery. Alyson Guaman has completed the pre-surgical requirements, classes and demonstrated readiness for surgery. Patient has had no recent illness, fevers or cough. Alyson Guaman has been in her usual state of health. She has had some weight gain and is worried surgery will be delayed. Weight gain due to steroids to treat idiopathic urticaria. She is off steroids now and has gained 7 lbs since office visit 2 months ago. Surgery Type: Gastric bypass  Date of Surgery: 6/9/22  Surgeon:  Bret Bearden MD   Start Pre op Diet: 5/18/22    Preadmission testing reviewed face to face with Alyson Guaman.   The following recommendations made based on findings:   Vit D deficiency - started on D 50,000 iu q week    Iron def anemia - started on bariatric multivitamin with 45 mg iron every day      Co-Morbidities:  Vitamin D deficiency   Iron def anemia due to menorrhagia   Idiopathic urticaria     Functional status INDEPENDENT     Social support  Boyfriend     Visit Vitals  Ht 5' 4\" (1.626 m)   Wt 326 lb (147.9 kg)   BMI 55.96 kg/m²     Body mass index is 55.96 kg/m². ICD-10-CM ICD-9-CM    1. Morbid obesity with BMI of 50.0-59.9, adult (HCC)  E66.01 278.01 enoxaparin (LOVENOX) 40 mg/0.4 mL    Z68.43 V85.43    2. Iron deficiency  E61.1 280.9    3. Vitamin D deficiency  E55.9 268.9 ergocalciferol (ERGOCALCIFEROL) 1,250 mcg (50,000 unit) capsule   4. Idiopathic urticaria  L50.1 708.1    5. Post-operative nausea and vomiting  R11.2 787.01 ondansetron (ZOFRAN ODT) 4 mg disintegrating tablet    Z98.890     6. Post-op pain  G89.18 338.18 gabapentin (NEURONTIN) 100 mg capsule       Plan:  1.   Morbid obesity:  Scheduled for gastric bypass on 6/9/22 with Dr. Eve Tapia MD     [facility] protocol reviewed:  Acetaminophen 1000 mg at noon and 8 pm day before surgery and may have clear liquids (no caffeine, no ETOH, no carbonation and calorie free) until 3 hours prior to surgery   Preadmission testing results reviewed with patient   Post operative prescriptions sent to pharmacy on file for AFTER surgery:    Acid suppression  x 30 days, then reassess need Omeprazole 40 mg every day   Antiemetic  zofran odt  Antispasmodic na  Laxative:  Miralax 1 packet every day #90/3R   DVT prophylaxis: Lovenox 40 mg sq every day x 14 days post op, Early ambulation, mechanical compression  Post op pain management:  Gabapentin 100-300 mg q 8 hours prn pain x 5 days; acetaminophen 1000 mg po q 8 hours prn; abdominal support / splinting; heating pad prn     Started on liver shrinking diet: 5/18/22  Reviewed and started Bariatric vitamins   Reviewed post operative diet, restrictions, follow up and medications  Post operative 2, 4 and 6  week appointments to be made  Reviewed educational materials and book    Goal is minimum 10 lbs weight loss on the pre op diet or surgery will be postponed and she understood     Josiah Su is scheduled to meet with Carrillo Robledo MD  on 5/18/22 to sign consents and address any final questions or concerns. Josiah Su verbalized understanding and questions were answered to the best of my knowledge and ability. Diet and pre / post op plan  educational materials were provided.     30  minutes spent virtually with patient

## 2022-05-18 ENCOUNTER — OFFICE VISIT (OUTPATIENT)
Dept: SURGERY | Age: 27
End: 2022-05-18

## 2022-05-18 VITALS
OXYGEN SATURATION: 98 % | SYSTOLIC BLOOD PRESSURE: 126 MMHG | DIASTOLIC BLOOD PRESSURE: 82 MMHG | RESPIRATION RATE: 20 BRPM | HEIGHT: 64 IN | TEMPERATURE: 98.1 F | BODY MASS INDEX: 50.02 KG/M2 | HEART RATE: 87 BPM | WEIGHT: 293 LBS

## 2022-05-18 DIAGNOSIS — J45.20 MILD INTERMITTENT ASTHMA WITHOUT COMPLICATION: ICD-10-CM

## 2022-05-18 DIAGNOSIS — E66.01 MORBID OBESITY (HCC): Primary | ICD-10-CM

## 2022-05-18 LAB
COPPER, WB: 1.43 UG/ML (ref 0.5–1.5)
VIT B1 BLD-SCNC: 97.2 NMOL/L (ref 66.5–200)

## 2022-05-18 NOTE — LETTER
5/18/2022 2:16 PM    Ms. Virginia Cotton  [facility] [phone]          To whom it may concern:        Virginia Cotton is having surgery with us and she will be out of work and on restrictions from 6-9-22     through 7-21-22. If you have any questions please have the patient call our office.             Sincerely,      Yue Carty MD

## 2022-05-18 NOTE — PROGRESS NOTES
1. Have you been to the ER, urgent care clinic since your last visit? Hospitalized since your last visit? No    2. Have you seen or consulted any other health care providers outside of the [address] [place] [address] since your last visit? Include any pap smears or colon screening.  no

## 2022-05-19 LAB
VIT A SERPL-MCNC: 19.6 UG/DL (ref 18.9–57.3)
VITAMIN E/ALPHA-TOCOPHEROL: 5.2 MG/L (ref 5.9–19.4)
VITAMIN E/GAMMA-TOCOPHEROL: 2.2 MG/L (ref 0.7–4.9)

## 2022-05-20 NOTE — PROGRESS NOTES
Subjective: The patient is a 27 y.o. obese female scheduled for laparoscopic gastric bypass. Body mass index is 56.47 kg/m². Danielle Sher has tried multiple diets in her  lifetime most recently trying unsupervised diets during which she was able to lose small amounts of weight and then gained it back plus more. Bariatric comorbidities present are   Past Medical History:   Diagnosis Date    Abnormal Papanicolaou smear of cervix     Abnormality in fetal heart rate and rhythm complicating labor and delivery 2/3/2021    Anxiety and depression     [facility]bid [facility]     Psychiatric problem     depression       Patient Active Problem List    Diagnosis Date Noted    H/O  section 2021    Body mass index 50.0-59.9, adult ([facility] 2021     Past Medical History:   Diagnosis Date    Abnormal Papanicolaou smear of cervix     Abnormality in fetal heart rate and rhythm complicating labor and delivery 2/3/2021    Anxiety and depression     Morbid obesity ([facility] [address]     Psychiatric problem     depression      Past Surgical History:   Procedure Laterality Date    HX  SECTION  2021      Social History     Tobacco Use    Smoking status: Never Smoker    Smokeless tobacco: Never Used   Substance Use Topics    Alcohol use: Yes     Comment: rarely      Family History   Problem Relation Age of Onset    Diabetes Mother     Hypertension Mother     No Known Problems Father     No Known Problems Brother     No Known Problems Brother     No Known Problems Son     Anesth Problems Neg Hx       Prior to Admission medications    Medication Sig Start Date End Date Taking? Authorizing Provider   busPIRone (BUSPAR) 15 mg tablet 15 mg two (2) times a day.  22  Yes Provider, Historical   traZODone (DESYREL) 50 mg tablet TAKE HALF TO ONE TABLET AT BEDTIME AS NEEDED FOR SLEEP 22  Yes Provider, Historical   metFORMIN (GLUCOPHAGE) 500 mg tablet Take  by mouth two (2) times daily (with meals). Yes Provider, Historical   buPROPion XL (WELLBUTRIN XL) 300 mg XL tablet TAKE 1 TABLET (300 MG) BY ORAL ROUTE ONCE DAILY FOR 90 DAYS 8/9/21  Yes Provider, Historical   famotidine (PEPCID) 20 mg tablet Take 20 mg by mouth two (2) times a day. 8/7/21  Yes Provider, Historical   montelukast (SINGULAIR) 10 mg tablet  8/30/21  Yes Provider, Historical   hydrOXYzine HCL (ATARAX) 25 mg tablet 25 mg three (3) times daily. 8/9/21  Yes Provider, Historical   cholecalciferol, vitamin D3, (Vitamin D3) 100 mcg (4,000 unit) cap Take  by mouth. Yes Provider, Historical   ergocalciferol (ERGOCALCIFEROL) 1,250 mcg (50,000 unit) capsule Take 1 Capsule by mouth every seven (7) days. Indications: low vitamin D levels  Patient not taking: Reported on 5/18/2022 5/17/22   Lori Gatica NP   omeprazole (PRILOSEC) 40 mg capsule Take 1 Capsule by mouth daily. AFTER SURGERY  Patient not taking: Reported on 5/18/2022 5/17/22   Lori Gatica NP   gabapentin (NEURONTIN) 100 mg capsule Take 1-2 Capsules by mouth every eight (8) hours as needed for Pain (AFTER SURGERY) for up to 5 days. Max Daily Amount: 600 mg. Patient not taking: Reported on 5/18/2022 5/17/22 5/22/22  Lori Gatica NP   ondansetron (ZOFRAN ODT) 4 mg disintegrating tablet Take 1 Tablet by mouth every eight (8) hours as needed for Nausea or Nausea or Vomiting (AFTER SURGERY). Patient not taking: Reported on 5/18/2022 5/17/22   Lori Gatica NP   polyethylene glycol [facility]) 17 gram packet Take 1 Packet by mouth daily. Indications: constipation  Patient not taking: Reported on 5/18/2022 5/17/22   Lori Gatica NP   enoxaparin (LOVENOX) 40 mg/0.4 mL 0.4 mL by SubCUTAneous route daily. AFTER SURGERY  Indications: deep vein thrombosis prevention  Patient not taking: Reported on 5/18/2022 5/17/22   Lori Gatica NP   phentermine 37.5 mg capsule Take 37.5 mg by mouth every morning.   Patient not taking: Reported on 5/18/2022    Provider, Historical     No Known Allergies        Objective:     Visit Vitals  /82 (BP 1 Location: Right lower arm, BP Patient Position: Sitting, BP Cuff Size: Large adult)   Pulse 87   Temp 98.1 °F (36.7 °C)   Resp 20   Ht 5' 4\" (1.626 m)   Wt 329 lb (149.2 kg)   SpO2 98%   BMI 56.47 kg/m²       Physical Exam:  GENERAL: alert, cooperative, no distress, appears stated age, morbidly obese    Assessment:     Morbid obesity with comorbidity; no success with medical management. Plan:     Laparoscopic gastric bypass with upper endoscopy. Technical aspects of procedure reviewed along with risks (to include but not limited to bleeding, wound infection, VTE, leak, conversion open procedure, ulcer, stricture, poor weight loss/weight regain). Also reviewed anticipated hospital course, postoperative diet, activity restrictions, and expected results. She understands and desires to proceed. All questions answered. She understands she will self administer outpatient Lovenox following hospital discharge. 25 minutes spent with patient (greater than 50% of time in face-face consultation reviewing potential risk, anticipated hospital course, postoperative diet, activity restrictions).       Signed By: Barbara Yadav MD     May 20, 2022

## 2022-05-20 NOTE — PATIENT INSTRUCTIONS
Learning About How to Prepare for Weight-Loss (Bariatric) Surgery  How can you prepare for weight-loss surgery? Having weight-loss surgery is a big step. You can prepare for surgery by having a plan. Your plan may include your goals for losing weight and how to make changes in your diet, activity, and lifestyle to help raise your chances of success. One way to prepare for surgery is to think about your goal or reason why you want to reach a healthy weight. Do you want to lower your blood pressure, cholesterol, or blood sugar? Do you want to be able to sleep better, play with your kids, or walk around the block? Having a reason can help you stay with your plan and meet your goals. You'll have a weight loss team that you can talk to about your plans and goals. The team will help you get ready for surgery. After surgery, the team will help you adjust to new ways of eating and changes to your body. How will weight-loss surgery affect your life? You have likely thought a lot about how surgery may affect your lifehow you will eat, how your body will look, or how you will feel. You probably will lose weight very quickly in the first few months after surgery. As time goes on, your weight loss will slow down. How much weight you lose depends on what type of surgery you had and how well your new eating and activity plans are working for you. There will be many changes. These may include:  A new way of eating. Success in reaching and keeping a healthy weight depends on making lifelong changes in how you eat. After surgery, you raise your chances of success if you:  · Eat just a few ounces of food at a time. · Eat very slowly and chew your food to mush. · Don't drink for 30 minutes before you eat, during your meal, and for 30 minutes after you eat. · Are careful about drinking alcohol. · Avoid foods that are high in fat or sugar. · Take vitamin and mineral supplements. A healthier you.   Weight-loss surgery can have some real health benefits. Problems like diabetes, high blood pressure, and sleep apnea may go awayor at least become easier to manage. A more active you. After surgery, being active on most days of the week will help you reach your weight goal and avoid gaining back the weight you lose. A lot of extra skin. When you lose weight quickly, you may have a lot of extra skin. That's normal. You can have surgery to remove the extra skin if it bothers you. There are going to be some ups and downs while you get used to these changes. So another way to adjust is to identify who can help support you. Getting support from friends and family can help. And joining a support group for people who have had the surgery can be a big help too, because they know what you're going through. Another way you can help yourself adjust to changes is to have a plan. When you have a plan, you can focus on losing weight and living a healthier life. So what steps can you take to prepare for weight-loss surgery? Will you set some goals? Will you learn about how surgery can affect your life? How about asking family or friends for help? Write out your plan. Then get ready. Where can you learn more? Go to http://www.gray.com/  Enter C413 in the search box to learn more about \"Learning About How to Prepare for Weight-Loss (Bariatric) Surgery. \"  Current as of: December 27, 2021               Content Version: 13.2  © 7134-6439 [employer], [employer]. Care instructions adapted under license by [employer] (which disclaims liability or warranty for this information). If you have questions about a medical condition or this instruction, always ask your healthcare professional. [address] any warranty or liability for your use of this information.

## 2022-05-23 ENCOUNTER — DOCUMENTATION ONLY (OUTPATIENT)
Dept: SURGERY | Age: 27
End: 2022-05-23

## 2022-05-23 ENCOUNTER — TELEPHONE (OUTPATIENT)
Dept: SURGERY | Age: 27
End: 2022-05-23

## 2022-05-23 NOTE — PROGRESS NOTES
Please have her add Vit E 400 iu every day to her vitamin regimen for Vitamin E deficiency - Thanks JM

## 2022-05-23 NOTE — PERIOP NOTES
CC MESSAGE SENT TO DR AGUAYO'S NURSE, BLADIMIR GIOVANNY:  Hi Bladimir,    Please note pt's Vitamin E level was 5.2 from PAT on 5/12/22. Please notify Dr. Mya Maria. Thank you! LAB RESULT ALSO SENT TO PCP VIA CC FAX.

## 2022-05-23 NOTE — TELEPHONE ENCOUNTER
I called the patient and I let her know that her Vitamin E was low and Sathya Vicente wants her to add Vitamin E 400 international units daily. Pt in agreement.

## 2022-05-23 NOTE — PROGRESS NOTES
Please note pt's Vitamin E level was 5.2 from PAT on 5/12/22. Please notify Dr. Yoni Jerry. Thank you!      Amanda Schmidt Np made aware of labs

## 2022-06-07 ENCOUNTER — TELEPHONE (OUTPATIENT)
Dept: SURGERY | Age: 27
End: 2022-06-07

## 2022-06-07 NOTE — TELEPHONE ENCOUNTER
I called the patient back and I let her know that she needs to stop her vitamins 1 week prior to surgery. Pt said she has stopped them and she wanted to just verify that. She then wanted to know if she should bring any of her medications with her to the Hospital and I told her no. Pt in agreement.

## 2022-06-07 NOTE — TELEPHONE ENCOUNTER
Patient called stating she is scheduled for surgery on 6/9/2022 and would like to know if she's suppose to stop taking her vitamins and should she bring her medications with her when she has surgery.

## 2022-06-08 ENCOUNTER — ANESTHESIA EVENT (OUTPATIENT)
Dept: SURGERY | Age: 27
End: 2022-06-08
Payer: COMMERCIAL

## 2022-06-09 ENCOUNTER — ANESTHESIA (OUTPATIENT)
Dept: SURGERY | Age: 27
End: 2022-06-09
Payer: COMMERCIAL

## 2022-06-09 ENCOUNTER — HOSPITAL ENCOUNTER (OUTPATIENT)
Age: 27
Setting detail: OBSERVATION
Discharge: HOME | End: 2022-06-11
Attending: SURGERY | Admitting: SURGERY
Payer: COMMERCIAL

## 2022-06-09 DIAGNOSIS — E66.01 MORBID OBESITY (HCC): ICD-10-CM

## 2022-06-09 DIAGNOSIS — Z98.84 S/P GASTRIC BYPASS: Primary | ICD-10-CM

## 2022-06-09 LAB
GLUCOSE BLD STRIP.AUTO-MCNC: 111 MG/DL (ref 65–117)
GLUCOSE BLD STRIP.AUTO-MCNC: 113 MG/DL (ref 65–117)
GLUCOSE BLD STRIP.AUTO-MCNC: 114 MG/DL (ref 65–117)
GLUCOSE BLD STRIP.AUTO-MCNC: 136 MG/DL (ref 65–117)
GLUCOSE BLD STRIP.AUTO-MCNC: 68 MG/DL (ref 65–117)
GLUCOSE BLD STRIP.AUTO-MCNC: 86 MG/DL (ref 65–117)
HCG UR QL: NEGATIVE
SERVICE CMNT-IMP: ABNORMAL
SERVICE CMNT-IMP: NORMAL

## 2022-06-09 PROCEDURE — 77030002916 HC SUT ETHLN J&J -A: Performed by: SURGERY

## 2022-06-09 PROCEDURE — 74011250636 HC RX REV CODE- 250/636: Performed by: NURSE ANESTHETIST, CERTIFIED REGISTERED

## 2022-06-09 PROCEDURE — 77030008023 HC RELD SUT ENDOSC COVD -B: Performed by: SURGERY

## 2022-06-09 PROCEDURE — 77030009956 HC RELD ENDOSTCH COVD -B: Performed by: SURGERY

## 2022-06-09 PROCEDURE — 74011250636 HC RX REV CODE- 250/636: Performed by: SURGERY

## 2022-06-09 PROCEDURE — 82962 GLUCOSE BLOOD TEST: CPT

## 2022-06-09 PROCEDURE — 77030008608 HC TRCR ENDOSC SMTH AMR -B: Performed by: SURGERY

## 2022-06-09 PROCEDURE — 77030009957 HC RELD ENDOSTCH COVD -C: Performed by: SURGERY

## 2022-06-09 PROCEDURE — 74011000250 HC RX REV CODE- 250: Performed by: SURGERY

## 2022-06-09 PROCEDURE — 77030039895 HC SYST SMK EVAC LAP COVD -B: Performed by: SURGERY

## 2022-06-09 PROCEDURE — 77030009965 HC RELD STPLR ENDOS COVD -D: Performed by: SURGERY

## 2022-06-09 PROCEDURE — 74011000250 HC RX REV CODE- 250: Performed by: NURSE ANESTHETIST, CERTIFIED REGISTERED

## 2022-06-09 PROCEDURE — 43644 LAP GASTRIC BYPASS/ROUX-EN-Y: CPT | Performed by: SURGERY

## 2022-06-09 PROCEDURE — 76210000006 HC OR PH I REC 0.5 TO 1 HR: Performed by: SURGERY

## 2022-06-09 PROCEDURE — 76060000036 HC ANESTHESIA 2.5 TO 3 HR: Performed by: SURGERY

## 2022-06-09 PROCEDURE — 77030016151 HC PROTCTR LNS DFOG COVD -B: Performed by: SURGERY

## 2022-06-09 PROCEDURE — 74011250636 HC RX REV CODE- 250/636

## 2022-06-09 PROCEDURE — 77030037366 HC STPLR ENDO TRI-STPLR COVD -C: Performed by: SURGERY

## 2022-06-09 PROCEDURE — 77030008684 HC TU ET CUF COVD -B: Performed by: ANESTHESIOLOGY

## 2022-06-09 PROCEDURE — 76010000132 HC OR TIME 2.5 TO 3 HR: Performed by: SURGERY

## 2022-06-09 PROCEDURE — 77030026438 HC STYL ET INTUB CARD -A: Performed by: ANESTHESIOLOGY

## 2022-06-09 PROCEDURE — 77030012770 HC TRCR OPT FX AMR -B: Performed by: SURGERY

## 2022-06-09 PROCEDURE — 77030020829: Performed by: SURGERY

## 2022-06-09 PROCEDURE — 77030031139 HC SUT VCRL2 J&J -A: Performed by: SURGERY

## 2022-06-09 PROCEDURE — 77030040956 HC DSCTR SONICISION MEDT -I: Performed by: SURGERY

## 2022-06-09 PROCEDURE — 77030010507 HC ADH SKN DERMBND J&J -B: Performed by: SURGERY

## 2022-06-09 PROCEDURE — 74011250637 HC RX REV CODE- 250/637: Performed by: SURGERY

## 2022-06-09 PROCEDURE — 77030002933 HC SUT MCRYL J&J -A: Performed by: SURGERY

## 2022-06-09 PROCEDURE — 77030008437 HC REINF STRP REINF SEMGD WLGO -C: Performed by: SURGERY

## 2022-06-09 PROCEDURE — 77030037032 HC INSRT SCIS CLICKLLINE DISP STOR -B: Performed by: SURGERY

## 2022-06-09 PROCEDURE — 77030040361 HC SLV COMPR DVT MDII -B: Performed by: SURGERY

## 2022-06-09 PROCEDURE — 77030040506 HC DRN WND MDII -A: Performed by: SURGERY

## 2022-06-09 PROCEDURE — 77030008756 HC TU IRR SUC STRY -B: Performed by: SURGERY

## 2022-06-09 PROCEDURE — 77030037367 HC STPLR ENDO TRI-STPLR COVD -D: Performed by: SURGERY

## 2022-06-09 PROCEDURE — 81025 URINE PREGNANCY TEST: CPT

## 2022-06-09 PROCEDURE — 77030038157 HC DEV PWR CNTR DISP SIGNIA COVD -C: Performed by: SURGERY

## 2022-06-09 PROCEDURE — 77030002895 HC DEV VASC CLOSR COVD -B: Performed by: SURGERY

## 2022-06-09 PROCEDURE — 74011250636 HC RX REV CODE- 250/636: Performed by: ANESTHESIOLOGY

## 2022-06-09 PROCEDURE — 2709999900 HC NON-CHARGEABLE SUPPLY: Performed by: SURGERY

## 2022-06-09 PROCEDURE — 65270000046 HC RM TELEMETRY

## 2022-06-09 RX ORDER — ACETAMINOPHEN 500 MG
1000 TABLET ORAL EVERY 6 HOURS
Status: DISCONTINUED | OUTPATIENT
Start: 2022-06-09 | End: 2022-06-11 | Stop reason: HOSPADM

## 2022-06-09 RX ORDER — PROCHLORPERAZINE EDISYLATE 5 MG/ML
5 INJECTION INTRAMUSCULAR; INTRAVENOUS
Status: DISCONTINUED | OUTPATIENT
Start: 2022-06-09 | End: 2022-06-11 | Stop reason: HOSPADM

## 2022-06-09 RX ORDER — PANTOPRAZOLE SODIUM 40 MG/1
40 TABLET, DELAYED RELEASE ORAL DAILY
Status: DISCONTINUED | OUTPATIENT
Start: 2022-06-10 | End: 2022-06-11 | Stop reason: HOSPADM

## 2022-06-09 RX ORDER — ONDANSETRON 2 MG/ML
INJECTION INTRAMUSCULAR; INTRAVENOUS AS NEEDED
Status: DISCONTINUED | OUTPATIENT
Start: 2022-06-09 | End: 2022-06-09 | Stop reason: HOSPADM

## 2022-06-09 RX ORDER — SODIUM CHLORIDE 0.9 % (FLUSH) 0.9 %
5-40 SYRINGE (ML) INJECTION AS NEEDED
Status: DISCONTINUED | OUTPATIENT
Start: 2022-06-09 | End: 2022-06-09 | Stop reason: HOSPADM

## 2022-06-09 RX ORDER — HYDROMORPHONE HYDROCHLORIDE 1 MG/ML
0.2 INJECTION, SOLUTION INTRAMUSCULAR; INTRAVENOUS; SUBCUTANEOUS
Status: DISCONTINUED | OUTPATIENT
Start: 2022-06-09 | End: 2022-06-09 | Stop reason: HOSPADM

## 2022-06-09 RX ORDER — BUPIVACAINE HYDROCHLORIDE 2.5 MG/ML
INJECTION, SOLUTION EPIDURAL; INFILTRATION; INTRACAUDAL AS NEEDED
Status: DISCONTINUED | OUTPATIENT
Start: 2022-06-09 | End: 2022-06-09 | Stop reason: HOSPADM

## 2022-06-09 RX ORDER — MAGNESIUM SULFATE HEPTAHYDRATE 40 MG/ML
INJECTION, SOLUTION INTRAVENOUS AS NEEDED
Status: DISCONTINUED | OUTPATIENT
Start: 2022-06-09 | End: 2022-06-09 | Stop reason: HOSPADM

## 2022-06-09 RX ORDER — SCOLOPAMINE TRANSDERMAL SYSTEM 1 MG/1
1 PATCH, EXTENDED RELEASE TRANSDERMAL ONCE
Status: DISCONTINUED | OUTPATIENT
Start: 2022-06-09 | End: 2022-06-11 | Stop reason: HOSPADM

## 2022-06-09 RX ORDER — SODIUM CHLORIDE, SODIUM LACTATE, POTASSIUM CHLORIDE, CALCIUM CHLORIDE 600; 310; 30; 20 MG/100ML; MG/100ML; MG/100ML; MG/100ML
50 INJECTION, SOLUTION INTRAVENOUS CONTINUOUS
Status: DISCONTINUED | OUTPATIENT
Start: 2022-06-09 | End: 2022-06-09 | Stop reason: HOSPADM

## 2022-06-09 RX ORDER — DEXMEDETOMIDINE HYDROCHLORIDE 100 UG/ML
INJECTION, SOLUTION INTRAVENOUS AS NEEDED
Status: DISCONTINUED | OUTPATIENT
Start: 2022-06-09 | End: 2022-06-09 | Stop reason: HOSPADM

## 2022-06-09 RX ORDER — SODIUM CHLORIDE 0.9 % (FLUSH) 0.9 %
5-40 SYRINGE (ML) INJECTION EVERY 8 HOURS
Status: DISCONTINUED | OUTPATIENT
Start: 2022-06-09 | End: 2022-06-09 | Stop reason: HOSPADM

## 2022-06-09 RX ORDER — ONDANSETRON 2 MG/ML
4 INJECTION INTRAMUSCULAR; INTRAVENOUS AS NEEDED
Status: DISCONTINUED | OUTPATIENT
Start: 2022-06-09 | End: 2022-06-09 | Stop reason: HOSPADM

## 2022-06-09 RX ORDER — LIDOCAINE HYDROCHLORIDE ANHYDROUS AND DEXTROSE MONOHYDRATE .8; 5 G/100ML; G/100ML
INJECTION, SOLUTION INTRAVENOUS
Status: DISCONTINUED | OUTPATIENT
Start: 2022-06-09 | End: 2022-06-09 | Stop reason: HOSPADM

## 2022-06-09 RX ORDER — SODIUM CHLORIDE 0.9 % (FLUSH) 0.9 %
5-40 SYRINGE (ML) INJECTION EVERY 8 HOURS
Status: DISCONTINUED | OUTPATIENT
Start: 2022-06-09 | End: 2022-06-11 | Stop reason: HOSPADM

## 2022-06-09 RX ORDER — GABAPENTIN 100 MG/1
200 CAPSULE ORAL 2 TIMES DAILY
Status: DISCONTINUED | OUTPATIENT
Start: 2022-06-09 | End: 2022-06-11 | Stop reason: HOSPADM

## 2022-06-09 RX ORDER — GABAPENTIN 250 MG/5ML
500 SOLUTION ORAL ONCE
Status: COMPLETED | OUTPATIENT
Start: 2022-06-09 | End: 2022-06-09

## 2022-06-09 RX ORDER — ONDANSETRON 2 MG/ML
4 INJECTION INTRAMUSCULAR; INTRAVENOUS
Status: DISCONTINUED | OUTPATIENT
Start: 2022-06-09 | End: 2022-06-11 | Stop reason: HOSPADM

## 2022-06-09 RX ORDER — KETAMINE HCL IN 0.9 % NACL 50 MG/5 ML
SYRINGE (ML) INTRAVENOUS AS NEEDED
Status: DISCONTINUED | OUTPATIENT
Start: 2022-06-09 | End: 2022-06-09 | Stop reason: HOSPADM

## 2022-06-09 RX ORDER — PROPOFOL 10 MG/ML
VIAL (ML) INTRAVENOUS
Status: DISCONTINUED | OUTPATIENT
Start: 2022-06-09 | End: 2022-06-09 | Stop reason: HOSPADM

## 2022-06-09 RX ORDER — BUPIVACAINE HYDROCHLORIDE 5 MG/ML
50 INJECTION, SOLUTION EPIDURAL; INTRACAUDAL ONCE
Status: DISCONTINUED | OUTPATIENT
Start: 2022-06-09 | End: 2022-06-09 | Stop reason: HOSPADM

## 2022-06-09 RX ORDER — NALOXONE HYDROCHLORIDE 0.4 MG/ML
0.4 INJECTION, SOLUTION INTRAMUSCULAR; INTRAVENOUS; SUBCUTANEOUS AS NEEDED
Status: DISCONTINUED | OUTPATIENT
Start: 2022-06-09 | End: 2022-06-11 | Stop reason: HOSPADM

## 2022-06-09 RX ORDER — HYDRALAZINE HYDROCHLORIDE 20 MG/ML
5 INJECTION INTRAMUSCULAR; INTRAVENOUS
Status: COMPLETED | OUTPATIENT
Start: 2022-06-09 | End: 2022-06-09

## 2022-06-09 RX ORDER — FENTANYL CITRATE 50 UG/ML
25 INJECTION, SOLUTION INTRAMUSCULAR; INTRAVENOUS
Status: COMPLETED | OUTPATIENT
Start: 2022-06-09 | End: 2022-06-09

## 2022-06-09 RX ORDER — SODIUM CHLORIDE, SODIUM LACTATE, POTASSIUM CHLORIDE, CALCIUM CHLORIDE 600; 310; 30; 20 MG/100ML; MG/100ML; MG/100ML; MG/100ML
INJECTION, SOLUTION INTRAVENOUS
Status: DISCONTINUED | OUTPATIENT
Start: 2022-06-09 | End: 2022-06-09 | Stop reason: HOSPADM

## 2022-06-09 RX ORDER — MIDAZOLAM HYDROCHLORIDE 1 MG/ML
INJECTION, SOLUTION INTRAMUSCULAR; INTRAVENOUS AS NEEDED
Status: DISCONTINUED | OUTPATIENT
Start: 2022-06-09 | End: 2022-06-09 | Stop reason: HOSPADM

## 2022-06-09 RX ORDER — PHENYLEPHRINE HCL IN 0.9% NACL 0.4MG/10ML
SYRINGE (ML) INTRAVENOUS AS NEEDED
Status: DISCONTINUED | OUTPATIENT
Start: 2022-06-09 | End: 2022-06-09 | Stop reason: HOSPADM

## 2022-06-09 RX ORDER — SODIUM CHLORIDE 0.9 % (FLUSH) 0.9 %
5-40 SYRINGE (ML) INJECTION AS NEEDED
Status: DISCONTINUED | OUTPATIENT
Start: 2022-06-09 | End: 2022-06-11 | Stop reason: HOSPADM

## 2022-06-09 RX ORDER — ACETAMINOPHEN 325 MG/1
650 TABLET ORAL ONCE
Status: DISCONTINUED | OUTPATIENT
Start: 2022-06-09 | End: 2022-06-09 | Stop reason: HOSPADM

## 2022-06-09 RX ORDER — PROPOFOL 10 MG/ML
INJECTION, EMULSION INTRAVENOUS AS NEEDED
Status: DISCONTINUED | OUTPATIENT
Start: 2022-06-09 | End: 2022-06-09 | Stop reason: HOSPADM

## 2022-06-09 RX ORDER — ENOXAPARIN SODIUM 100 MG/ML
40 INJECTION SUBCUTANEOUS EVERY 24 HOURS
Status: DISCONTINUED | OUTPATIENT
Start: 2022-06-10 | End: 2022-06-11 | Stop reason: HOSPADM

## 2022-06-09 RX ORDER — SODIUM CHLORIDE, SODIUM LACTATE, POTASSIUM CHLORIDE, CALCIUM CHLORIDE 600; 310; 30; 20 MG/100ML; MG/100ML; MG/100ML; MG/100ML
125 INJECTION, SOLUTION INTRAVENOUS CONTINUOUS
Status: DISCONTINUED | OUTPATIENT
Start: 2022-06-09 | End: 2022-06-11 | Stop reason: HOSPADM

## 2022-06-09 RX ORDER — MIDAZOLAM HYDROCHLORIDE 1 MG/ML
1 INJECTION, SOLUTION INTRAMUSCULAR; INTRAVENOUS AS NEEDED
Status: DISCONTINUED | OUTPATIENT
Start: 2022-06-09 | End: 2022-06-09 | Stop reason: HOSPADM

## 2022-06-09 RX ORDER — DEXAMETHASONE SODIUM PHOSPHATE 4 MG/ML
INJECTION, SOLUTION INTRA-ARTICULAR; INTRALESIONAL; INTRAMUSCULAR; INTRAVENOUS; SOFT TISSUE AS NEEDED
Status: DISCONTINUED | OUTPATIENT
Start: 2022-06-09 | End: 2022-06-09 | Stop reason: HOSPADM

## 2022-06-09 RX ORDER — HYDROMORPHONE HYDROCHLORIDE 1 MG/ML
1 INJECTION, SOLUTION INTRAMUSCULAR; INTRAVENOUS; SUBCUTANEOUS
Status: DISCONTINUED | OUTPATIENT
Start: 2022-06-09 | End: 2022-06-11 | Stop reason: HOSPADM

## 2022-06-09 RX ORDER — ROCURONIUM BROMIDE 10 MG/ML
INJECTION, SOLUTION INTRAVENOUS AS NEEDED
Status: DISCONTINUED | OUTPATIENT
Start: 2022-06-09 | End: 2022-06-09 | Stop reason: HOSPADM

## 2022-06-09 RX ORDER — FENTANYL CITRATE 50 UG/ML
50 INJECTION, SOLUTION INTRAMUSCULAR; INTRAVENOUS AS NEEDED
Status: DISCONTINUED | OUTPATIENT
Start: 2022-06-09 | End: 2022-06-09 | Stop reason: HOSPADM

## 2022-06-09 RX ORDER — LIDOCAINE HYDROCHLORIDE 10 MG/ML
0.1 INJECTION, SOLUTION EPIDURAL; INFILTRATION; INTRACAUDAL; PERINEURAL AS NEEDED
Status: DISCONTINUED | OUTPATIENT
Start: 2022-06-09 | End: 2022-06-09 | Stop reason: HOSPADM

## 2022-06-09 RX ORDER — HYOSCYAMINE SULFATE 0.12 MG/1
0.12 TABLET SUBLINGUAL
Status: DISCONTINUED | OUTPATIENT
Start: 2022-06-09 | End: 2022-06-11 | Stop reason: HOSPADM

## 2022-06-09 RX ORDER — SUCCINYLCHOLINE CHLORIDE 20 MG/ML
INJECTION INTRAMUSCULAR; INTRAVENOUS AS NEEDED
Status: DISCONTINUED | OUTPATIENT
Start: 2022-06-09 | End: 2022-06-09 | Stop reason: HOSPADM

## 2022-06-09 RX ORDER — OMEPRAZOLE 40 MG/1
40 CAPSULE, DELAYED RELEASE ORAL DAILY
Qty: 30 CAPSULE | Refills: 1 | Status: SHIPPED | OUTPATIENT
Start: 2022-06-09 | End: 2022-07-14 | Stop reason: SDUPTHER

## 2022-06-09 RX ORDER — MAGNESIUM SULFATE 100 %
4 CRYSTALS MISCELLANEOUS AS NEEDED
Status: DISCONTINUED | OUTPATIENT
Start: 2022-06-09 | End: 2022-06-11 | Stop reason: HOSPADM

## 2022-06-09 RX ORDER — LORAZEPAM 2 MG/ML
1 INJECTION, SOLUTION INTRAMUSCULAR; INTRAVENOUS
Status: DISCONTINUED | OUTPATIENT
Start: 2022-06-09 | End: 2022-06-11 | Stop reason: HOSPADM

## 2022-06-09 RX ORDER — HYDRALAZINE HYDROCHLORIDE 20 MG/ML
INJECTION INTRAMUSCULAR; INTRAVENOUS
Status: COMPLETED
Start: 2022-06-09 | End: 2022-06-09

## 2022-06-09 RX ORDER — HYDROMORPHONE HYDROCHLORIDE 1 MG/ML
0.5 INJECTION, SOLUTION INTRAMUSCULAR; INTRAVENOUS; SUBCUTANEOUS
Status: DISCONTINUED | OUTPATIENT
Start: 2022-06-09 | End: 2022-06-11 | Stop reason: HOSPADM

## 2022-06-09 RX ORDER — SODIUM CHLORIDE, SODIUM LACTATE, POTASSIUM CHLORIDE, CALCIUM CHLORIDE 600; 310; 30; 20 MG/100ML; MG/100ML; MG/100ML; MG/100ML
500 INJECTION, SOLUTION INTRAVENOUS CONTINUOUS
Status: DISPENSED | OUTPATIENT
Start: 2022-06-09 | End: 2022-06-09

## 2022-06-09 RX ORDER — FENTANYL CITRATE 50 UG/ML
INJECTION, SOLUTION INTRAMUSCULAR; INTRAVENOUS AS NEEDED
Status: DISCONTINUED | OUTPATIENT
Start: 2022-06-09 | End: 2022-06-09 | Stop reason: HOSPADM

## 2022-06-09 RX ADMIN — CEFOXITIN SODIUM 2 G: 2 POWDER, FOR SOLUTION INTRAVENOUS at 20:41

## 2022-06-09 RX ADMIN — HYDROMORPHONE HYDROCHLORIDE 0.2 MG: 1 INJECTION, SOLUTION INTRAMUSCULAR; INTRAVENOUS; SUBCUTANEOUS at 17:07

## 2022-06-09 RX ADMIN — LIDOCAINE HYDROCHLORIDE 2 MG/KG/HR: 8 INJECTION, SOLUTION INTRAVENOUS at 10:40

## 2022-06-09 RX ADMIN — MIDAZOLAM 2 MG: 1 INJECTION INTRAMUSCULAR; INTRAVENOUS at 10:30

## 2022-06-09 RX ADMIN — SODIUM CHLORIDE, POTASSIUM CHLORIDE, SODIUM LACTATE AND CALCIUM CHLORIDE: 600; 310; 30; 20 INJECTION, SOLUTION INTRAVENOUS at 10:30

## 2022-06-09 RX ADMIN — Medication 80 MCG: at 12:40

## 2022-06-09 RX ADMIN — HYDROMORPHONE HYDROCHLORIDE 0.2 MG: 1 INJECTION, SOLUTION INTRAMUSCULAR; INTRAVENOUS; SUBCUTANEOUS at 14:12

## 2022-06-09 RX ADMIN — Medication 30 MG: at 10:32

## 2022-06-09 RX ADMIN — SODIUM CHLORIDE, POTASSIUM CHLORIDE, SODIUM LACTATE AND CALCIUM CHLORIDE 50 ML/HR: 600; 310; 30; 20 INJECTION, SOLUTION INTRAVENOUS at 09:07

## 2022-06-09 RX ADMIN — PROPOFOL 200 MG: 10 INJECTION, EMULSION INTRAVENOUS at 10:32

## 2022-06-09 RX ADMIN — ROCURONIUM BROMIDE 10 MG: 10 SOLUTION INTRAVENOUS at 10:32

## 2022-06-09 RX ADMIN — ROCURONIUM BROMIDE 40 MG: 10 SOLUTION INTRAVENOUS at 10:40

## 2022-06-09 RX ADMIN — FENTANYL CITRATE 25 MCG: 50 INJECTION, SOLUTION INTRAMUSCULAR; INTRAVENOUS at 13:48

## 2022-06-09 RX ADMIN — HYDRALAZINE HYDROCHLORIDE 5 MG: 20 INJECTION, SOLUTION INTRAMUSCULAR; INTRAVENOUS at 14:55

## 2022-06-09 RX ADMIN — DEXAMETHASONE SODIUM PHOSPHATE 8 MG: 4 INJECTION, SOLUTION INTRAMUSCULAR; INTRAVENOUS at 10:40

## 2022-06-09 RX ADMIN — GABAPENTIN 500 MG: 250 SOLUTION ORAL at 08:56

## 2022-06-09 RX ADMIN — GABAPENTIN 200 MG: 100 CAPSULE ORAL at 19:05

## 2022-06-09 RX ADMIN — FENTANYL CITRATE 25 MCG: 50 INJECTION, SOLUTION INTRAMUSCULAR; INTRAVENOUS at 13:37

## 2022-06-09 RX ADMIN — CEFOXITIN SODIUM 2 G: 2 POWDER, FOR SOLUTION INTRAVENOUS at 12:45

## 2022-06-09 RX ADMIN — SUCCINYLCHOLINE CHLORIDE 160 MG: 20 INJECTION, SOLUTION INTRAMUSCULAR; INTRAVENOUS at 10:32

## 2022-06-09 RX ADMIN — FENTANYL CITRATE 50 MCG: 50 INJECTION, SOLUTION INTRAMUSCULAR; INTRAVENOUS at 10:32

## 2022-06-09 RX ADMIN — ACETAMINOPHEN ORAL SOLUTION 1000 MG: 650 SOLUTION ORAL at 08:56

## 2022-06-09 RX ADMIN — FENTANYL CITRATE 50 MCG: 50 INJECTION, SOLUTION INTRAMUSCULAR; INTRAVENOUS at 11:03

## 2022-06-09 RX ADMIN — MAGNESIUM SULFATE 2 G: 2 INJECTION INTRAVENOUS at 10:59

## 2022-06-09 RX ADMIN — DEXMEDETOMIDINE HYDROCHLORIDE 4 MCG: 100 INJECTION, SOLUTION, CONCENTRATE INTRAVENOUS at 11:08

## 2022-06-09 RX ADMIN — HYDRALAZINE HYDROCHLORIDE 5 MG: 20 INJECTION, SOLUTION INTRAMUSCULAR; INTRAVENOUS at 15:18

## 2022-06-09 RX ADMIN — ONDANSETRON HYDROCHLORIDE 4 MG: 2 SOLUTION INTRAMUSCULAR; INTRAVENOUS at 14:09

## 2022-06-09 RX ADMIN — SODIUM CHLORIDE, POTASSIUM CHLORIDE, SODIUM LACTATE AND CALCIUM CHLORIDE 125 ML/HR: 600; 310; 30; 20 INJECTION, SOLUTION INTRAVENOUS at 14:28

## 2022-06-09 RX ADMIN — PROPOFOL 50 MCG/KG/MIN: 10 INJECTION, EMULSION INTRAVENOUS at 10:40

## 2022-06-09 RX ADMIN — Medication 80 MCG: at 12:23

## 2022-06-09 RX ADMIN — HYOSCYAMINE SULFATE 0.12 MG: 0.12 TABLET ORAL; SUBLINGUAL at 15:43

## 2022-06-09 RX ADMIN — ONDANSETRON HYDROCHLORIDE 4 MG: 2 INJECTION, SOLUTION INTRAMUSCULAR; INTRAVENOUS at 13:02

## 2022-06-09 RX ADMIN — ROCURONIUM BROMIDE 20 MG: 10 SOLUTION INTRAVENOUS at 12:01

## 2022-06-09 RX ADMIN — CEFOXITIN SODIUM 2 G: 2 POWDER, FOR SOLUTION INTRAVENOUS at 10:45

## 2022-06-09 RX ADMIN — HYDROMORPHONE HYDROCHLORIDE 0.2 MG: 1 INJECTION, SOLUTION INTRAMUSCULAR; INTRAVENOUS; SUBCUTANEOUS at 15:03

## 2022-06-09 RX ADMIN — ACETAMINOPHEN 1000 MG: 500 TABLET ORAL at 19:05

## 2022-06-09 RX ADMIN — Medication 80 MCG: at 12:17

## 2022-06-09 RX ADMIN — FENTANYL CITRATE 25 MCG: 50 INJECTION, SOLUTION INTRAMUSCULAR; INTRAVENOUS at 13:32

## 2022-06-09 RX ADMIN — HYDROMORPHONE HYDROCHLORIDE 1 MG: 1 INJECTION, SOLUTION INTRAMUSCULAR; INTRAVENOUS; SUBCUTANEOUS at 19:05

## 2022-06-09 RX ADMIN — HYDROMORPHONE HYDROCHLORIDE 1 MG: 1 INJECTION, SOLUTION INTRAMUSCULAR; INTRAVENOUS; SUBCUTANEOUS at 23:14

## 2022-06-09 RX ADMIN — FENTANYL CITRATE 25 MCG: 50 INJECTION, SOLUTION INTRAMUSCULAR; INTRAVENOUS at 13:58

## 2022-06-09 RX ADMIN — DEXMEDETOMIDINE HYDROCHLORIDE 4 MCG: 100 INJECTION, SOLUTION, CONCENTRATE INTRAVENOUS at 11:14

## 2022-06-09 RX ADMIN — HYOSCYAMINE SULFATE 0.12 MG: 0.12 TABLET ORAL; SUBLINGUAL at 20:37

## 2022-06-09 RX ADMIN — SODIUM CHLORIDE, POTASSIUM CHLORIDE, SODIUM LACTATE AND CALCIUM CHLORIDE: 600; 310; 30; 20 INJECTION, SOLUTION INTRAVENOUS at 13:26

## 2022-06-09 RX ADMIN — SUGAMMADEX 300 MG: 100 INJECTION, SOLUTION INTRAVENOUS at 13:12

## 2022-06-09 NOTE — ANESTHESIA PREPROCEDURE EVALUATION
Relevant Problems   ENDOCRINE   (+) Morbid obesity (HCC)       Anesthetic History   No history of anesthetic complications            Review of Systems / Medical History  Patient summary reviewed, nursing notes reviewed and pertinent labs reviewed    Pulmonary            Asthma        Neuro/Psych         Psychiatric history     Cardiovascular            Dysrhythmias       Exercise tolerance: >4 METS     GI/Hepatic/Renal  Within defined limits              Endo/Other        Morbid obesity     Other Findings              Physical Exam    Airway  Mallampati: II    Neck ROM: normal range of motion   Mouth opening: Normal     Cardiovascular  Regular rate and rhythm,  S1 and S2 normal,  no murmur, click, rub, or gallop  Rhythm: regular  Rate: normal         Dental  No notable dental hx       Pulmonary  Breath sounds clear to auscultation               Abdominal  GI exam deferred       Other Findings            Anesthetic Plan    ASA: 3  Anesthesia type: general          Induction: Intravenous  Anesthetic plan and risks discussed with: Patient
89

## 2022-06-09 NOTE — ROUTINE PROCESS
Patient: Howard Monique MRN: [id number]  SSN: [id number]   YOB: 1995  Age: 27 y.o. Sex: female     Patient is status post Procedure(s):  LAPAROSCOPIC GASTRIC BYPASS, EGD  (E R A S). Surgeon(s) and Role:     * Carmina Quinones MD - Primary     * Barrington Santana MD - Assisting    Local/Dose/Irrigation:  30ml 0.25% marcaine                  Peripheral IV 06/09/22 Posterior;Right Hand (Active)   Site Assessment Clean, dry, & intact 06/09/22 0907   Phlebitis Assessment 0 06/09/22 0907   Infiltration Assessment 0 06/09/22 0907   Dressing Status Clean, dry, & intact 06/09/22 0907   Dressing Type Transparent;Tape 06/09/22 0907   Hub Color/Line Status Blue; Infusing 06/09/22 0297          Markell-Dawson Drain 06/09/22 Left;Upper Abdomen (Active)      Airway - Endotracheal Tube 06/09/22 Oral (Active)                 Dressing/Packing:  Incision 06/09/22 Abdomen-Dressing/Treatment: Skin glue (dermabond) (06/09/22 1120)    Splint/Cast:  ]

## 2022-06-09 NOTE — PROGRESS NOTES
Bedside shift change report given to Mery (oncoming nurse) by Lino Harrell (offgoing nurse). Report included the following information SBAR, Kardex, Intake/Output, MAR and Recent Results.

## 2022-06-09 NOTE — ANESTHESIA POSTPROCEDURE EVALUATION
Post-Anesthesia Evaluation and Assessment    Patient: Gold Reveles MRN: [id number]  SSN: [id number]    YOB: 1995  Age: 27 y.o. Sex: female      I have evaluated the patient and they are stable and ready for discharge from the PACU. Cardiovascular Function/Vital Signs  Visit Vitals  /79   Pulse 78   Temp 36.9 °C (98.5 °F)   Resp 24   Ht 5' 4\" (1.626 m)   Wt 142 kg (313 lb 0.9 oz)   SpO2 98%   BMI 53.74 kg/m²       Patient is status post General anesthesia for Procedure(s):  LAPAROSCOPIC GASTRIC BYPASS, EGD  (E R A S). Nausea/Vomiting: None    Postoperative hydration reviewed and adequate. Pain:  Pain Scale 1: Numeric (0 - 10) (06/09/22 0837)  Pain Intensity 1: 0 (06/09/22 0837)   Managed    Neurological Status:   Neuro (WDL): Within Defined Limits (06/09/22 0847)   At baseline    Mental Status, Level of Consciousness: Alert and  oriented to person, place, and time    Pulmonary Status:   O2 Device: Nasal cannula (06/09/22 1324)   Adequate oxygenation and airway patent    Complications related to anesthesia: None    Post-anesthesia assessment completed. Nausea with multiple doses of compazine.     Signed By: Parveen Weathers MD     June 9, 2022

## 2022-06-09 NOTE — H&P
Bariatric Surgery History and Physical    Subjective: The patient is a 27 y.o. obese female scheduled for laparoscopic gastric bypass. Body mass index is 53.74 kg/m². Chanel Kennedy has tried multiple diets in her  lifetime most recently trying our preoperative diet during which she was able to lose small amounts of weight (16 pounds). No fever, cough, or chills. Bariatric comorbidities present are   Past Medical History:   Diagnosis Date    Abnormal Papanicolaou smear of cervix     Abnormality in fetal heart rate and rhythm complicating labor and delivery 2/3/2021    Anxiety and depression     Morbid obesity ([address]     Psychiatric problem     depression       Patient Active Problem List    Diagnosis Date Noted    Morbid obesity ([address] 2022    H/O  section 2021    Body mass index 50.0-59.9, adult ([address] 2021     Past Medical History:   Diagnosis Date    Abnormal Papanicolaou smear of cervix     Abnormality in fetal heart rate and rhythm complicating labor and delivery 2/3/2021    Anxiety and depression     [facility]     Psychiatric problem     depression      Past Surgical History:   Procedure Laterality Date    HX  SECTION  2021      Social History     Tobacco Use    Smoking status: Never Smoker    Smokeless tobacco: Never Used   Substance Use Topics    Alcohol use: Yes     Comment: rarely      Family History   Problem Relation Age of Onset    Diabetes Mother     Hypertension Mother     No Known Problems Father     No Known Problems Brother     No Known Problems Brother     No Known Problems Son     Anesth Problems Neg Hx       Prior to Admission medications    Medication Sig Start Date End Date Taking? Authorizing Provider   omeprazole (PRILOSEC) 40 mg capsule TAKE 1 CAPSULE BY MOUTH DAILY.  AFTER SURGERY 22  Yes Sushma Pandya NP   ergocalciferol (ERGOCALCIFEROL) 1,250 mcg (50,000 unit) capsule Take 1 Capsule by mouth every seven (7) days. Indications: low vitamin D levels 5/17/22  Yes Annie Lam NP   ondansetron (ZOFRAN ODT) 4 mg disintegrating tablet Take 1 Tablet by mouth every eight (8) hours as needed for Nausea or Nausea or Vomiting (AFTER SURGERY). 5/17/22  Yes Annie Lam NP   phentermine 37.5 mg capsule Take 37.5 mg by mouth every morning. Yes Provider, Historical   busPIRone (BUSPAR) 15 mg tablet 15 mg two (2) times a day. 1/17/22  Yes Provider, Historical   traZODone (DESYREL) 50 mg tablet TAKE HALF TO ONE TABLET AT BEDTIME AS NEEDED FOR SLEEP 1/17/22  Yes Provider, Historical   metFORMIN (GLUCOPHAGE) 500 mg tablet Take  by mouth two (2) times daily (with meals). Yes Provider, Historical   buPROPion XL (WELLBUTRIN XL) 300 mg XL tablet TAKE 1 TABLET (300 MG) BY ORAL ROUTE ONCE DAILY FOR 90 DAYS 8/9/21  Yes Provider, Historical   famotidine (PEPCID) 20 mg tablet Take 20 mg by mouth two (2) times a day. 8/7/21  Yes Provider, Historical   montelukast (SINGULAIR) 10 mg tablet  8/30/21  Yes Provider, Historical   hydrOXYzine HCL (ATARAX) 25 mg tablet 25 mg three (3) times daily. 8/9/21  Yes Provider, Historical   cholecalciferol, vitamin D3, (Vitamin D3) 100 mcg (4,000 unit) cap Take  by mouth. Yes Provider, Historical   polyethylene glycol (MIRALAX) 17 gram packet Take 1 Packet by mouth daily. Indications: constipation  Patient not taking: Reported on 5/18/2022 5/17/22   Annie Lam NP   enoxaparin (LOVENOX) 40 mg/0.4 mL 0.4 mL by SubCUTAneous route daily. AFTER SURGERY  Indications: deep vein thrombosis prevention 5/17/22   Annie Lam NP     No Known Allergies      Review of Systems:    Review of Systems   Constitutional: Positive for weight loss. Negative for chills and fever. HENT: Negative. Eyes: Negative. Respiratory: Positive for shortness of breath. Negative for cough and wheezing. Cardiovascular: Negative for chest pain and palpitations.    Gastrointestinal: Negative for abdominal pain, diarrhea, heartburn, nausea and vomiting. Genitourinary: Negative for dysuria, frequency and urgency. Musculoskeletal: Positive for back pain, joint pain and neck pain. Skin: Negative. Neurological: Negative. Endo/Heme/Allergies: Negative. Psychiatric/Behavioral: Negative. Objective:     Visit Vitals  BP (!) 140/59 (BP 1 Location: Right upper arm, BP Patient Position: Supine; At rest)   Pulse 69   Temp 98.2 °F (36.8 °C)   Resp 16   Ht 5' 4\" (1.626 m)   Wt 313 lb 0.9 oz (142 kg)   SpO2 99%   BMI 53.74 kg/m²       Physical Exam:  GENERAL: alert, cooperative, no distress, appears stated age, morbidly obese, EYE: negative, THROAT & NECK: normal, LUNG: clear to auscultation bilaterally, HEART: regular rate and rhythm, S1, S2 normal, no murmur. ABDOMEN: Obese, nondistended, soft. No pain with palpation, mass, or hernia. EXTREMITIES:  extremities normal, atraumatic, no cyanosis or edema, SKIN: Normal., NEUROLOGIC: negative. Lab Review:    Recent Results (from the past 24 hour(s))   HCG URINE, QL. - POC    Collection Time: 06/09/22  8:49 AM   Result Value Ref Range    Pregnancy test,urine (POC) Negative NEG     GLUCOSE, POC    Collection Time: 06/09/22  9:11 AM   Result Value Ref Range    Glucose (POC) 68 65 - 117 mg/dL    Performed by Rafal Almazan      Upper gastrointestinal series: No hiatal hernia or spontaneous reflux. Assessment:     Morbid obesity; no success with medical management. Plan:     Laparoscopic gastric bypass with upper endoscopy. Technical aspects of procedure reviewed along with risks (to include but not limited to bleeding, wound infection, VTE, leak, conversion open procedure, ulcer, stricture, poor weight loss/weight regain). Also reviewed anticipated hospital course, postoperative diet, activity restrictions, and expected results. She understands and desires to proceed. All questions answered.         Signed By: Jayne Smith MD     June 9, 2022

## 2022-06-09 NOTE — BRIEF OP NOTE
Brief Postoperative Note    Patient: Ronda Aj  YOB: 1995  MRN: [id number]    Date of Procedure: 6/9/2022     Pre-Op Diagnosis: MORBID OBESITY    Post-Op Diagnosis: Same as preoperative diagnosis. Procedure(s):  LAPAROSCOPIC GASTRIC BYPASS, EGD  (E R A S)    Surgeon(s):  MD Ajay Kilpatrick MD    Surgical Assistant: Surg Asst-1: Annie Adams    Anesthesia: General     Estimated Blood Loss (mL): Minimal    Complications: None    Specimens: * No specimens in log *     Implants:   Implant Name Type Inv.  Item Serial No.  Lot No. LRB No. Used Action   [place] Seamguard Bioabsorbable Staple Reinforcement    N/A  [id number] N/A 3 Implanted       Drains:   Markell-Dawson Drain 06/09/22 Left;Upper Abdomen (Active)       Findings: 30 cc gastric pouch with 150 cm AC/AG Levon; no air leak or hemorrhage on endoscopy    Electronically Signed by Vel Montoya MD on 6/9/2022 at 1:19 PM

## 2022-06-10 LAB
ANION GAP SERPL CALC-SCNC: 11 MMOL/L (ref 5–15)
BASOPHILS # BLD: 0 K/UL (ref 0–0.1)
BASOPHILS NFR BLD: 0 % (ref 0–1)
BUN SERPL-MCNC: 6 MG/DL (ref 6–20)
BUN/CREAT SERPL: 6 (ref 12–20)
CALCIUM SERPL-MCNC: 9.7 MG/DL (ref 8.5–10.1)
CHLORIDE SERPL-SCNC: 108 MMOL/L (ref 97–108)
CO2 SERPL-SCNC: 18 MMOL/L (ref 21–32)
CREAT SERPL-MCNC: 1.04 MG/DL (ref 0.55–1.02)
DIFFERENTIAL METHOD BLD: ABNORMAL
EOSINOPHIL # BLD: 0 K/UL (ref 0–0.4)
EOSINOPHIL NFR BLD: 0 % (ref 0–7)
ERYTHROCYTE [DISTWIDTH] IN BLOOD BY AUTOMATED COUNT: 16.2 % (ref 11.5–14.5)
GLUCOSE BLD STRIP.AUTO-MCNC: 73 MG/DL (ref 65–117)
GLUCOSE BLD STRIP.AUTO-MCNC: 74 MG/DL (ref 65–117)
GLUCOSE BLD STRIP.AUTO-MCNC: 85 MG/DL (ref 65–117)
GLUCOSE BLD STRIP.AUTO-MCNC: 89 MG/DL (ref 65–117)
GLUCOSE BLD STRIP.AUTO-MCNC: 94 MG/DL (ref 65–117)
GLUCOSE SERPL-MCNC: 102 MG/DL (ref 65–100)
HCT VFR BLD AUTO: 41.8 % (ref 35–47)
HGB BLD-MCNC: 13.3 G/DL (ref 11.5–16)
IMM GRANULOCYTES # BLD AUTO: 0.1 K/UL (ref 0–0.04)
IMM GRANULOCYTES NFR BLD AUTO: 1 % (ref 0–0.5)
LYMPHOCYTES # BLD: 0.7 K/UL (ref 0.8–3.5)
LYMPHOCYTES NFR BLD: 5 % (ref 12–49)
MCH RBC QN AUTO: 26.2 PG (ref 26–34)
MCHC RBC AUTO-ENTMCNC: 31.8 G/DL (ref 30–36.5)
MCV RBC AUTO: 82.4 FL (ref 80–99)
MONOCYTES # BLD: 0.4 K/UL (ref 0–1)
MONOCYTES NFR BLD: 3 % (ref 5–13)
NEUTS SEG # BLD: 12.4 K/UL (ref 1.8–8)
NEUTS SEG NFR BLD: 91 % (ref 32–75)
NRBC # BLD: 0 K/UL (ref 0–0.01)
NRBC BLD-RTO: 0 PER 100 WBC
PLATELET # BLD AUTO: 313 K/UL (ref 150–400)
PMV BLD AUTO: 11.8 FL (ref 8.9–12.9)
POTASSIUM SERPL-SCNC: 5 MMOL/L (ref 3.5–5.1)
RBC # BLD AUTO: 5.07 M/UL (ref 3.8–5.2)
RBC MORPH BLD: ABNORMAL
SERVICE CMNT-IMP: NORMAL
SODIUM SERPL-SCNC: 137 MMOL/L (ref 136–145)
WBC # BLD AUTO: 13.6 K/UL (ref 3.6–11)
WBC MORPH BLD: ABNORMAL

## 2022-06-10 PROCEDURE — 85025 COMPLETE CBC W/AUTO DIFF WBC: CPT

## 2022-06-10 PROCEDURE — 74011250636 HC RX REV CODE- 250/636: Performed by: SURGERY

## 2022-06-10 PROCEDURE — 74011250637 HC RX REV CODE- 250/637: Performed by: SURGERY

## 2022-06-10 PROCEDURE — 82962 GLUCOSE BLOOD TEST: CPT

## 2022-06-10 PROCEDURE — 96372 THER/PROPH/DIAG INJ SC/IM: CPT

## 2022-06-10 PROCEDURE — 74011000250 HC RX REV CODE- 250: Performed by: SURGERY

## 2022-06-10 PROCEDURE — 36415 COLL VENOUS BLD VENIPUNCTURE: CPT

## 2022-06-10 PROCEDURE — G0378 HOSPITAL OBSERVATION PER HR: HCPCS

## 2022-06-10 PROCEDURE — 80048 BASIC METABOLIC PNL TOTAL CA: CPT

## 2022-06-10 PROCEDURE — 96374 THER/PROPH/DIAG INJ IV PUSH: CPT

## 2022-06-10 RX ORDER — HYDROMORPHONE HYDROCHLORIDE 2 MG/1
2 TABLET ORAL
Qty: 25 TABLET | Refills: 0 | Status: SHIPPED | OUTPATIENT
Start: 2022-06-10 | End: 2022-06-17

## 2022-06-10 RX ORDER — BUPROPION HYDROCHLORIDE 150 MG/1
150 TABLET, EXTENDED RELEASE ORAL 2 TIMES DAILY
Status: DISCONTINUED | OUTPATIENT
Start: 2022-06-10 | End: 2022-06-11 | Stop reason: HOSPADM

## 2022-06-10 RX ORDER — TRAZODONE HYDROCHLORIDE 50 MG/1
50 TABLET ORAL
Status: DISCONTINUED | OUTPATIENT
Start: 2022-06-10 | End: 2022-06-11 | Stop reason: HOSPADM

## 2022-06-10 RX ORDER — HYDROMORPHONE HYDROCHLORIDE 2 MG/1
2-4 TABLET ORAL
Status: DISCONTINUED | OUTPATIENT
Start: 2022-06-10 | End: 2022-06-11 | Stop reason: HOSPADM

## 2022-06-10 RX ADMIN — GABAPENTIN 200 MG: 100 CAPSULE ORAL at 19:09

## 2022-06-10 RX ADMIN — ENOXAPARIN SODIUM 40 MG: 100 INJECTION SUBCUTANEOUS at 10:29

## 2022-06-10 RX ADMIN — PANTOPRAZOLE SODIUM 40 MG: 40 TABLET, DELAYED RELEASE ORAL at 10:28

## 2022-06-10 RX ADMIN — ACETAMINOPHEN 1000 MG: 500 TABLET ORAL at 18:00

## 2022-06-10 RX ADMIN — BUSPIRONE HYDROCHLORIDE 15 MG: 5 TABLET ORAL at 10:28

## 2022-06-10 RX ADMIN — BUPROPION HYDROCHLORIDE 150 MG: 150 TABLET, EXTENDED RELEASE ORAL at 10:28

## 2022-06-10 RX ADMIN — ACETAMINOPHEN 1000 MG: 500 TABLET ORAL at 06:00

## 2022-06-10 RX ADMIN — SODIUM CHLORIDE, POTASSIUM CHLORIDE, SODIUM LACTATE AND CALCIUM CHLORIDE 125 ML/HR: 600; 310; 30; 20 INJECTION, SOLUTION INTRAVENOUS at 12:35

## 2022-06-10 RX ADMIN — HYDROMORPHONE HYDROCHLORIDE 1 MG: 1 INJECTION, SOLUTION INTRAMUSCULAR; INTRAVENOUS; SUBCUTANEOUS at 16:23

## 2022-06-10 RX ADMIN — GABAPENTIN 200 MG: 100 CAPSULE ORAL at 10:19

## 2022-06-10 RX ADMIN — HYDROMORPHONE HYDROCHLORIDE 1 MG: 1 INJECTION, SOLUTION INTRAMUSCULAR; INTRAVENOUS; SUBCUTANEOUS at 04:34

## 2022-06-10 RX ADMIN — CEFOXITIN SODIUM 2 G: 2 POWDER, FOR SOLUTION INTRAVENOUS at 04:34

## 2022-06-10 RX ADMIN — HYDROMORPHONE HYDROCHLORIDE 4 MG: 2 TABLET ORAL at 10:26

## 2022-06-10 RX ADMIN — SODIUM CHLORIDE, POTASSIUM CHLORIDE, SODIUM LACTATE AND CALCIUM CHLORIDE 125 ML/HR: 600; 310; 30; 20 INJECTION, SOLUTION INTRAVENOUS at 04:37

## 2022-06-10 NOTE — OP NOTES
[address]   OPERATIVE REPORT    Name:  Clark Dixon  MR#:  [id number]  :  1995  ACCOUNT #:  [de-identified]  DATE OF SERVICE:  2022    PRIMARY PREOPERATIVE DIAGNOSIS:  Morbid obesity. SECONDARY PREOPERATIVE DIAGNOSES:  1. Depression. 2.  Degenerative joint disease. POSTOPERATIVE DIAGNOSES:  1.  Morbid obesity. 2.  Depression. 3.  Degenerative joint disease    PROCEDURES PERFORMED:  1. Laparoscopic Levon-en-Y gastric bypass (CPT 82250). 2.  Intraoperative upper endoscopy. SURGEON:  Tiffani Dos Santos MD    ASSISTANT:  Katherine Mahan MD    ANESTHESIA:  General endotracheal.    COMPLICATIONS:  None. SPECIMENS REMOVED:  None. IMPLANTS:  None. ESTIMATED BLOOD LOSS:  30 mL. DRAINS:  19-mm Suraj drain. COUNTS:  Sponge count correct. Needle count correct. INDICATIONS:  The patient is a 28-year-old [facility] American female with a height of 64 inches, weight of 313 pounds, with a resultant body mass index of 53.7 kg/m2 on a medium frame. She has the above-listed obesity-related conditions. All medical efforts at weight loss have been unsuccessful. After extensive preoperative counseling, patient education, medical screening, it was felt she would be a good candidate for weight reduction surgery. She presents to [facility] today for laparoscopic gastric bypass. I have asked Katherine Mahan MD, to assist with the procedure given the unavailability of a suitable assistant. He will run the laparoscope, assist in creation of the jejunojejunostomy, assist in creation of the gastric pouch, and assist in creation of the gastrojejunostomy. FINDINGS:  1. Creation of a 30 mL gastric pouch with 097-PINEDA antecolic, antegastric Levon limb. 2.  No intraluminal hemorrhage or insufflation air leak on upper endoscopy. PROCEDURE:  The patient was identified as the correct patient in the preoperative holding area and informed consent was confirmed.   After answering the patient's remaining questions, she was taken to the operating room and placed on the operating room table in the supine position. Sequential compression devices were placed on both lower extremities. Following the uneventful initiation of general anesthesia, she was carefully secured to the operating room table with footboard and safety strap in place. All potential pressure points were padded with eggcrate. Her abdomen was prepped and draped in the usual sterile fashion. Final time-out was performed, and it was confirmed she had received intravenous antibiotics. A 5-mm trocar was inserted through a small right upper quadrant skin incision using an Optiview technique. After confirming intraperitoneal location of the trocar tip, insufflation with carbon dioxide gas was initiated. Once adequate working sites had been developed, the 5-mm, 30-degree laparoscope was inserted. No signs of trocar injury were present. The liver was noted to be somewhat enlarged. Omental adhesions in the hypogastrium were identified. A 5-mm epigastric trocar was inserted through a small incision using visual guidance with the laparoscope. A left subcostal 5-mm trocar was inserted using identical technique. A right upper quadrant 12-mm trocar was inserted using visual guidance with the laparoscope. Omental adhesions to the hypogastric abdominal wall were taken down using the Harmonic scalpel. Once freed, the omentum was retracted into the upper abdomen and serially ligated and divided from its free edge to the antimesenteric border of the transverse colon using the Harmonic scalpel. With the transverse colon elevated anteriorly and cephalad, the ligament of Treitz was identified. 50 cm of bowel measured distal to this landmark. The jejunum was divided at this point using a tan load linear stapler firing. The distal edge of the transected bowel was marked with a 0 Polysorb suture.   The small bowel mesentery was mobilized using Harmonic scalpel. Once adequate mobility of the alimentary limb had been achieved, an additional 150 cm of bowel measured distal to this transection site. This segment of bowel was brought alongside the biliopancreatic limb in side-to-side fashion. The Harmonic scalpel was used to make openings in both the alimentary limb and biliopancreatic limb, through these openings, a 60-mm tan load linear stapler was carefully inserted. After confirming correct insertion and orientation of the stapler, the stapler was closed, fired, and removed. The staple line was noted to be intact. The remaining opening was closed with a running 2-0 Polysorb suture. A tension-relieving, anti-obstruction sutures placed at the distal edge of the staple line. The mesenteric defect was closed with a running 0 Surgidac suture. The Levon limb was delivered into the upper abdomen with care to avoid twisting of its blood supply or the lumen. The patient was placed in a steep reverse Trendelenburg position. The [facility] liver retractor was inserted through a small subxiphoid incision. With the left lobe of the liver retracted anteriorly and cephalad, the esophageal hiatus was visualized. No signs of hiatal hernia were present. The fat pad and angle of His were mobilized from the left dot of the diaphragm using Harmonic scalpel and blunt dissection. The pars flaccida portion of the gastrohepatic ligament was incised, allowing atraumatic entry into the lesser sac. Adhesions between the anterior aspect of the pancreas and the gastrohepatic ligament were taken down using careful blunt dissection. A site 4 cm distal to the gastroesophageal junction was chosen. The gastrohepatic ligament was serially ligated and divided at this level using the Harmonic scalpel.   A 30 mL gastric pouch was created with multiple purple load linear stapler firings, utilizing Seamguard reinforcement material.  The calibration tube was used to size pouch construction. Following pouch construction, the calibration tube was removed, and the Levon limb was delivered into the left upper quadrant with care to avoid twisting of the bowel or its blood supply. A running suture was placed between the antimesenteric border of the Levon limb and the lateral aspect of the gastric pouch using a 0 Surgidac running suture. The Harmonic scalpel was used to make openings in both the gastric pouch and the Levon limb, and through these openings, a 30-mm tan load linear stapler was carefully inserted. After confirming correct insertion and orientation of the stapler, the stapler was closed, fired, and removed. The staple line was noted to be hemostatic. The remaining opening was closed with a running 2-0 Polysorb suture, completed over a  Olympus endoscope passed transorally, through the anastomosis, and into the Levon limb. This suture line was imbricated with a running 0 Surgidac suture. The patient was returned to the supine position. An intestinal clamp was placed in the Levon limb distal to the gastroscope. Sterile saline was instilled into the upper abdomen. Intraoperative upper endoscopy was continued. The gastroscope was withdrawn into the pouch, and insufflation was reinitiated. With adequate distention of the pouch and Levon limb, no intraluminal hemorrhage was identified. No insufflation air leak occurred. The anastomosis was hemostatic and patent. The bowel was decompressed and the gastroscope was removed. Sterile saline was evacuated from the upper abdomen. Intestinal clamp was removed. Goff's space was closed with a running 0 Surgidac suture. A tension-relieving, hitch suture was placed between the antimesenteric border of the Levon limb and excluded stomach. A 19-mm Suraj drain was inserted into the abdominal space.   This allowed to lie adjacent to the gastrojejunal anastomosis and brought out through the left subcostal 5-mm trocar site. It was secured to the skin with a 2-0 nylon suture. After confirming satisfactory hemostasis, the June liver retractor was removed, followed by closure of the 12-mm fascial defect using a 0 Vicryl suture with a laparoscopic suture passer. Pneumoperitoneum was released, and all trocars were removed. All wounds were infiltrated with 0.5% Marcaine without epinephrine. All skin edges were reapproximated with a combination of subcuticular 4-0 Monocryl suture and Dermabond. The patient tolerated the procedure well. She was extubated in the operating room and transported to the recovery area in stable condition. The attending surgeon, Dr. Elizabeth Lama, was scrubbed and present for the entire procedure.       Brendan Frank MD      [id number]  D:  06/10/2022 11:44  T:  06/10/2022 15:35  JOB #:  [id number]

## 2022-06-10 NOTE — CONSULTS
NUTRITION     Chart reviewed. Post-op bariatric diet instruction completed. Will gladly follow up for additional questions as needed. Thank you.      Shashi Aguilar RD  Contact via [facility]

## 2022-06-10 NOTE — PROGRESS NOTES
Progress Note    Patient: Kashmir Nicholas MRN: [id number]  SSN: [id number]    YOB: 1995  Age: 27 y.o. Sex: female      Admit Date: 2022    1 Day Post-Op    Procedure:  Procedure(s):  LAPAROSCOPIC GASTRIC BYPASS, EGD  (E R A S)    Subjective:     Patient complains of mild incisional pain, worse on right side. She has started bariatric liquids. She is ambulated once in the halls. She has started working with incentive spirometry. .     Objective:     Visit Vitals  /68   Pulse 75   Temp 98.3 °F (36.8 °C)   Resp 18   Ht 5' 4\" (1.626 m)   Wt 313 lb 0.9 oz (142 kg)   SpO2 99%   BMI 53.74 kg/m²       Temp (24hrs), Av.4 °F (36.9 °C), Min:98 °F (36.7 °C), Max:98.9 °F (37.2 °C)    Date 22 - 06/10/22 0659 06/10/22 07 - 22 0659   Shift 6956-8715 9464-0769 24 Hour Total 7724-8880 7886-4846 24 Hour Total   INTAKE   P.O.  30 30        P. O.  30 30      I. V.(mL/kg/hr) 1786.7(1)  1786.7(0.5)        I.V. 150  150        Volume (lactated Ringers infusion) 1250  1250        Volume (lactated Ringers infusion) 316.7  316.7        Volume (cefOXitin (MEFOXIN) 2 g in sterile water (preservative free) 20 mL iv syringe) 20  20        Volume (magnesium sulfate 2 g/50 ml IVPB (premix or compounded)) 50  50      Shift Total(mL/kg) 1786. 7(12.6) 30(0.2) 1816.7(12.8)      OUTPUT   Urine(mL/kg/hr) 300(0.2) 2600(1.5) 2900(0.9)        Urine Voided 300 2600 2900      Drains 50 85 135        Output (ml) (Markell-Dawson Drain 22 Left;Upper Abdomen) 50 85 135      Blood 25  25        Estimated Blood Loss 25  25      Shift Total(mL/kg) 375(2.6) 5392(85.8) 8333(75.7)      NET 1411.7 -1115 -1243.3      Weight (kg) 142 142 142 142 142 142       Physical Exam:    LUNG: clear to auscultation bilaterally, HEART: regular rate and rhythm, S1, S2 normal, no murmur. ABDOMEN: Obese, nondistended, soft. Laparoscopic wounds dry and intact. Serosanguineous drain fluid.   Appropriate incisional pain with palpation. Data Review: Vital signs, ins/outs, labs    Lab Review:   Recent Results (from the past 24 hour(s))   GLUCOSE, POC    Collection Time: 06/09/22 12:10 PM   Result Value Ref Range    Glucose (POC) 136 (H) 65 - 117 mg/dL    Performed by Mai Lane    GLUCOSE, POC    Collection Time: 06/09/22  1:40 PM   Result Value Ref Range    Glucose (POC) 113 65 - 117 mg/dL    Performed by Chiquita Marte, POC    Collection Time: 06/09/22  6:57 PM   Result Value Ref Range    Glucose (POC) 114 65 - 117 mg/dL    Performed by Pearl Kemp    GLUCOSE, POC    Collection Time: 06/09/22 10:02 PM   Result Value Ref Range    Glucose (POC) 111 65 - 117 mg/dL    Performed by Kenisha 44, BASIC    Collection Time: 06/10/22  4:39 AM   Result Value Ref Range    Sodium 137 136 - 145 mmol/L    Potassium 5.0 3.5 - 5.1 mmol/L    Chloride 108 97 - 108 mmol/L    CO2 18 (L) 21 - 32 mmol/L    Anion gap 11 5 - 15 mmol/L    Glucose 102 (H) 65 - 100 mg/dL    BUN 6 6 - 20 MG/DL    Creatinine 1.04 (H) 0.55 - 1.02 MG/DL    BUN/Creatinine ratio 6 (L) 12 - 20      GFR est AA >60 >60 ml/min/1.73m2    GFR est non-AA >60 >60 ml/min/1.73m2    Calcium 9.7 8.5 - 10.1 MG/DL   CBC WITH AUTOMATED DIFF    Collection Time: 06/10/22  4:49 AM   Result Value Ref Range    WBC 13.6 (H) 3.6 - 11.0 K/uL    RBC 5.07 3.80 - 5.20 M/uL    HGB 13.3 11.5 - 16.0 g/dL    HCT 41.8 35.0 - 47.0 %    MCV 82.4 80.0 - 99.0 FL    MCH 26.2 26.0 - 34.0 PG    MCHC 31.8 30.0 - 36.5 g/dL    RDW 16.2 (H) 11.5 - 14.5 %    PLATELET 025 446 - 614 K/uL    MPV 11.8 8.9 - 12.9 FL    NRBC 0.0 0  WBC    ABSOLUTE NRBC 0.00 0.00 - 0.01 K/uL    NEUTROPHILS 91 (H) 32 - 75 %    LYMPHOCYTES 5 (L) 12 - 49 %    MONOCYTES 3 (L) 5 - 13 %    EOSINOPHILS 0 0 - 7 %    BASOPHILS 0 0 - 1 %    IMMATURE GRANULOCYTES 1 (H) 0.0 - 0.5 %    ABS. NEUTROPHILS 12.4 (H) 1.8 - 8.0 K/UL    ABS. LYMPHOCYTES 0.7 (L) 0.8 - 3.5 K/UL    ABS. MONOCYTES 0.4 0.0 - 1.0 K/UL    ABS. EOSINOPHILS 0.0 0.0 - 0.4 K/UL    ABS. BASOPHILS 0.0 0.0 - 0.1 K/UL    ABS. IMM. GRANS. 0.1 (H) 0.00 - 0.04 K/UL    DF SMEAR SCANNED      RBC COMMENTS ANISOCYTOSIS  1+        WBC COMMENTS REACTIVE LYMPHS     GLUCOSE, POC    Collection Time: 06/10/22  6:42 AM   Result Value Ref Range    Glucose (POC) 94 65 - 117 mg/dL    Performed by Sandor Rodriguez        Assessment:     Hospital Problems  Date Reviewed: 6/9/2022          Codes Class Noted POA    * (Principal) Morbid obesity ([address] ICD-10-CM: E66.01  ICD-9-CM: 278.01  6/9/2022 Yes              Plan/Recommendations/Medical Decision Making:     Bariatric liquids-goal 4 ounces per hour. Oral analgesics, prior to admission medications. Ambulation, incentive spirometry. Lovenox training for home use; start Lovenox today. Continue mechanical DVT prophylaxis.

## 2022-06-10 NOTE — PROGRESS NOTES
Transition of Care: likely home with f/u with specialist/pcp    Transport Plan: likely in car with boyfriend/family    RUR: 7%    Main contact is Laith Maya- [phone] or mother- Mary Cannon- [phone]    Discharge pending:   -patient is POD#1 gastric bypass  -pending diet advancement  -pending medical progress    1515: this CM attempted to meet with patient to do initial assessment; patient sleeping; will try again later; per chart notes; patients pcp is Dejah Mills MD ([facility])    Reason for Admission:  Morbid obesity/scheduled procedure                     RUR Score:    7%                 Plan for utilizing home health:      Likely will not need [facility]    PCP: First and Last name:  Pavel Lara MD     Name of Practice:    Are you a current patient: Yes/No: yes   Approximate date of last visit:    Can you participate in a virtual visit with your PCP: unknown                    Current Advanced Directive/Advance Care Plan: Full Code      Healthcare Decision Maker:   Click here to complete [address] including selection of the Healthcare Decision Maker Relationship (ie \"Primary\")                             Transition of Care Plan:    Likely home with f/u with specialist/pcp; transport likely in car with boyfriend/family    Care Management Interventions  Mode of Transport at Discharge:  Other (see comment) (TBD; likely in car with friend/family)  Support Systems: Spouse/Significant Other  Discharge Location  Patient Expects to be Discharged to[de-identified] Other: (TBD; likely home with f/u with specialist/pcp)     CM following  Dawson Vaz RN, CRM

## 2022-06-10 NOTE — DISCHARGE INSTRUCTIONS
[facility] at [facility]  Bariatric Surgery Discharge Instructions     Procedure Laparoscopic gastric bypass     Future Appointments   Date Time Provider Will Jojo   6/23/2022  1:40 PM ADEBAYO Cotto AMB   7/11/2022  2:20 PM ADEBAYO Cotto AMB   7/21/2022  1:00 PM ADEBAYO Cotto BS AMB         Contact Information:    [facility] at [facility], [address] [facility], [address]  [phone]    After Hours and Weekends  [phone] On Call Surgeon    Non Emergent Medical Needs  Call during office hours or send a message via My Chart   (messages returned during business hours)    DIET    Please remember that you are on ONLY LIQUIDS for the first 2 weeks after surgery. Do not advance to the next phase until advised by your surgeon or Nurse Practitioner. Refer to the Bariatric Handbook for detailed information. TO PREVENT DEHYDRATION:  consume 48-64 ounces of liquids daily. At least 48 ounces of that should come from water, Crystal Light, sugar free popsicles, sugar free gelatin or other calorie-free, sugar-free, caffeine free and noncarbonated beverages. Do not drink with a straw.  Sip, sip, sip throughout the day   Main priority is to stay hydrated   Aim for 60 grams of protein every day. Most of your protein will come from shakes. Refer to the Bariatric Handbook for detailed information.    Add additional protein supplements to meet protein needs (protein powder, clear protein such as protein water, non-fat dry milk powder, NO protein bars at this at this stage)     MEDICATIONS & VITAMINS     Pre-surgery medications should be reviewed with your Bariatric provider and taken as prescribed    Take no more than 2 pills at a time and wait 15-20 minutes between pills       Pain Medication  The first few days home, you may require narcotic pain medication to manage your pain. Take this medication only as prescribed. If your pain is mild to moderate, try taking Acetaminophen (Tylenol) 500 mg 1-2 tablets every 8 hours or as directed by your provider. Avoid taking antiinflammatory medications (NSAID'S) such as Ibuprofen (Motrin, Advil) or Naproxen (Aleve). These medications can be harmful to your stomach and cause bleeding and ulcers. There is a complete list of NSAID medications to AVOID in your handbook. Abdominal support (Spanx or body shaper) and heat (heating pad on low setting) are very helpful in managing pain after surgery. Acid Reducing (\"heartburn/reflux\") Medication   Acid reducing medicine should have been prescribed at your pre-surgery visit. It is recommended you take this medication every day even if you have no symptoms of reflux or heartburn. If you were previously on a medication for reflux/heartburn you should continue the medication daily. *It is common to experience reflux or heartburn after sleeve gastrectomy. These symptoms can usually be managed with medication, diet and behavior changes. In most cases symptoms improve or resolve after a few weeks to a couple of months. Nausea Medication  You should have been prescribed medication for nausea at your pre-surgery visit. If you are experiencing nausea, please take the medication as prescribed to try and get relief. If the nausea medication is not effective, please call your surgeon's office. Constipation   Constipation can be caused by pain medication and reduced food and water intake. Drink at least 64 oz. fluid. OK to use OTC medications such as Benefiber, Milk of Magnesia, Dulcolax, Miralax, senna       Vitamins   Calcium Citrate with Vitamin D-3 - Take 1200--1500 mg  each day. Divide doses throughout the day. Do not take more than 600 mg at one time. Take at least 2 hours before or after your multivitamin and/or iron supplement.   Multivitamin containing Iron - 2 multivitamins with 100% Daily Value of Iron, Folic Acid and Thiamine   Vitamin D-3 - Take 3000 IU  per day  Vitamin B-12 - Oral or Sublingual: 350-500 mcg/day OR 1000 mcg Monthly intramuscular shot        ACTIVITY     Be active. Sit up as much as possible. Walk often. Walking and/or foot exercises will help prevent blood clots.  Continue to sip liquids throughout the day   Continue to use your incentive spirometer 4 to 5 times per day   Keep your incisions clean and dry to prevent infection.  Showering is ok. No submersion in water for 2 weeks (No tubs, pools, etc.)   Weight lifting restrictions:  10 lbs. for the first 2 weeks, 20 lbs. for the next 4 to 6 weeks    TOP REASONS TO CONTACT YOUR SURGEONS'S OFFICE     You have severe pain or discomfort unrelieved by pain medication.  You have been vomiting for more than 24 hours. Call sooner if you are unable to drink any fluids.  Temperature rises above 101 degrees.  You have persistent nausea and/or vomiting.  You are unable to swallow liquids    Increased swelling, redness, or drainage from your incision sites.

## 2022-06-11 VITALS
SYSTOLIC BLOOD PRESSURE: 140 MMHG | OXYGEN SATURATION: 98 % | HEIGHT: 64 IN | RESPIRATION RATE: 20 BRPM | HEART RATE: 68 BPM | TEMPERATURE: 98.6 F | BODY MASS INDEX: 50.02 KG/M2 | DIASTOLIC BLOOD PRESSURE: 84 MMHG | WEIGHT: 293 LBS

## 2022-06-11 LAB
ANION GAP SERPL CALC-SCNC: 9 MMOL/L (ref 5–15)
BASOPHILS # BLD: 0 K/UL (ref 0–0.1)
BASOPHILS NFR BLD: 0 % (ref 0–1)
BUN SERPL-MCNC: 8 MG/DL (ref 6–20)
BUN/CREAT SERPL: 9 (ref 12–20)
CALCIUM SERPL-MCNC: 9.2 MG/DL (ref 8.5–10.1)
CHLORIDE SERPL-SCNC: 108 MMOL/L (ref 97–108)
CO2 SERPL-SCNC: 21 MMOL/L (ref 21–32)
CREAT SERPL-MCNC: 0.93 MG/DL (ref 0.55–1.02)
DIFFERENTIAL METHOD BLD: ABNORMAL
EOSINOPHIL # BLD: 0 K/UL (ref 0–0.4)
EOSINOPHIL NFR BLD: 0 % (ref 0–7)
ERYTHROCYTE [DISTWIDTH] IN BLOOD BY AUTOMATED COUNT: 16.5 % (ref 11.5–14.5)
GLUCOSE BLD STRIP.AUTO-MCNC: 74 MG/DL (ref 65–117)
GLUCOSE BLD STRIP.AUTO-MCNC: 81 MG/DL (ref 65–117)
GLUCOSE SERPL-MCNC: 79 MG/DL (ref 65–100)
HCT VFR BLD AUTO: 36.8 % (ref 35–47)
HGB BLD-MCNC: 11.8 G/DL (ref 11.5–16)
IMM GRANULOCYTES # BLD AUTO: 0 K/UL (ref 0–0.04)
IMM GRANULOCYTES NFR BLD AUTO: 0 % (ref 0–0.5)
LYMPHOCYTES # BLD: 2.1 K/UL (ref 0.8–3.5)
LYMPHOCYTES NFR BLD: 17 % (ref 12–49)
MCH RBC QN AUTO: 26.2 PG (ref 26–34)
MCHC RBC AUTO-ENTMCNC: 32.1 G/DL (ref 30–36.5)
MCV RBC AUTO: 81.8 FL (ref 80–99)
MONOCYTES # BLD: 0.7 K/UL (ref 0–1)
MONOCYTES NFR BLD: 5 % (ref 5–13)
NEUTS SEG # BLD: 9.5 K/UL (ref 1.8–8)
NEUTS SEG NFR BLD: 78 % (ref 32–75)
NRBC # BLD: 0 K/UL (ref 0–0.01)
NRBC BLD-RTO: 0 PER 100 WBC
PLATELET # BLD AUTO: 269 K/UL (ref 150–400)
PMV BLD AUTO: 11.4 FL (ref 8.9–12.9)
POTASSIUM SERPL-SCNC: 3.9 MMOL/L (ref 3.5–5.1)
RBC # BLD AUTO: 4.5 M/UL (ref 3.8–5.2)
SERVICE CMNT-IMP: NORMAL
SERVICE CMNT-IMP: NORMAL
SODIUM SERPL-SCNC: 138 MMOL/L (ref 136–145)
WBC # BLD AUTO: 12.3 K/UL (ref 3.6–11)

## 2022-06-11 PROCEDURE — 74011250636 HC RX REV CODE- 250/636: Performed by: SURGERY

## 2022-06-11 PROCEDURE — 94762 N-INVAS EAR/PLS OXIMTRY CONT: CPT

## 2022-06-11 PROCEDURE — 80048 BASIC METABOLIC PNL TOTAL CA: CPT

## 2022-06-11 PROCEDURE — 36415 COLL VENOUS BLD VENIPUNCTURE: CPT

## 2022-06-11 PROCEDURE — G0378 HOSPITAL OBSERVATION PER HR: HCPCS

## 2022-06-11 PROCEDURE — 74011250637 HC RX REV CODE- 250/637: Performed by: SURGERY

## 2022-06-11 PROCEDURE — 85025 COMPLETE CBC W/AUTO DIFF WBC: CPT

## 2022-06-11 PROCEDURE — 82962 GLUCOSE BLOOD TEST: CPT

## 2022-06-11 PROCEDURE — 96372 THER/PROPH/DIAG INJ SC/IM: CPT

## 2022-06-11 RX ADMIN — GABAPENTIN 200 MG: 100 CAPSULE ORAL at 08:03

## 2022-06-11 RX ADMIN — ACETAMINOPHEN ORAL SOLUTION 1000 MG: 650 SOLUTION ORAL at 05:18

## 2022-06-11 RX ADMIN — BUPROPION HYDROCHLORIDE 150 MG: 150 TABLET, EXTENDED RELEASE ORAL at 08:03

## 2022-06-11 RX ADMIN — ENOXAPARIN SODIUM 40 MG: 100 INJECTION SUBCUTANEOUS at 08:22

## 2022-06-11 RX ADMIN — HYDROMORPHONE HYDROCHLORIDE 2 MG: 2 TABLET ORAL at 08:03

## 2022-06-11 RX ADMIN — ALUMINUM HYDROXIDE AND MAGNESIUM HYDROXIDE 15 ML: 200; 200 SUSPENSION ORAL at 10:21

## 2022-06-11 RX ADMIN — BUSPIRONE HYDROCHLORIDE 15 MG: 5 TABLET ORAL at 08:03

## 2022-06-11 RX ADMIN — PANTOPRAZOLE SODIUM 40 MG: 40 TABLET, DELAYED RELEASE ORAL at 08:03

## 2022-06-11 NOTE — PROGRESS NOTES
Progress Note    Patient: Brad Smiley MRN: [id number]  SSN: [id number]    YOB: 1995  Age: 27 y.o. Sex: female      Admit Date: 2022    2 Days Post-Op    Procedure:  Procedure(s):  LAPAROSCOPIC GASTRIC BYPASS, EGD  (E R A S)    Subjective:     No acute surgical issues. Pt is doing well. Tolerating bariatric liquids. She did report some gastric pain when she takes the liquid. No nausea or vomiting. CHRISSIE drain is serosanguinous.     Objective:     Visit Vitals  /64   Pulse 69   Temp 98.5 °F (36.9 °C)   Resp 18   Ht 5' 4\" (1.626 m)   Wt 313 lb 0.9 oz (142 kg)   SpO2 100%   BMI 53.74 kg/m²       Temp (24hrs), Av.4 °F (36.9 °C), Min:98 °F (36.7 °C), Max:98.6 °F (37 °C)        Physical Exam:    Gen:  NAD  Pulm:  Unlabored  Abd:  S/ND/appropriate TTP  Wound:  C/D/I    Recent Results (from the past 24 hour(s))   GLUCOSE, POC    Collection Time: 06/10/22 11:52 AM   Result Value Ref Range    Glucose (POC) 89 65 - 117 mg/dL    Performed by Finas Primghar    GLUCOSE, POC    Collection Time: 06/10/22  4:13 PM   Result Value Ref Range    Glucose (POC) 73 65 - 117 mg/dL    Performed by Finas Primghar    GLUCOSE, POC    Collection Time: 06/10/22  7:02 PM   Result Value Ref Range    Glucose (POC) 85 65 - 117 mg/dL    Performed by Zayra Luna    GLUCOSE, POC    Collection Time: 06/10/22  9:22 PM   Result Value Ref Range    Glucose (POC) 74 65 - 117 mg/dL    Performed by GRETCHEN Francisco    CBC WITH AUTOMATED DIFF    Collection Time: 22  5:17 AM   Result Value Ref Range    WBC 12.3 (H) 3.6 - 11.0 K/uL    RBC 4.50 3.80 - 5.20 M/uL    HGB 11.8 11.5 - 16.0 g/dL    HCT 36.8 35.0 - 47.0 %    MCV 81.8 80.0 - 99.0 FL    MCH 26.2 26.0 - 34.0 PG    MCHC 32.1 30.0 - 36.5 g/dL    RDW 16.5 (H) 11.5 - 14.5 %    PLATELET 941 617 - 314 K/uL    MPV 11.4 8.9 - 12.9 FL    NRBC 0.0 0  WBC    ABSOLUTE NRBC 0.00 0.00 - 0.01 K/uL    NEUTROPHILS 78 (H) 32 - 75 %    LYMPHOCYTES 17 12 - 49 %    MONOCYTES 5 5 - 13 %    EOSINOPHILS 0 0 - 7 %    BASOPHILS 0 0 - 1 %    IMMATURE GRANULOCYTES 0 0.0 - 0.5 %    ABS. NEUTROPHILS 9.5 (H) 1.8 - 8.0 K/UL    ABS. LYMPHOCYTES 2.1 0.8 - 3.5 K/UL    ABS. MONOCYTES 0.7 0.0 - 1.0 K/UL    ABS. EOSINOPHILS 0.0 0.0 - 0.4 K/UL    ABS. BASOPHILS 0.0 0.0 - 0.1 K/UL    ABS. IMM.  GRANS. 0.0 0.00 - 0.04 K/UL    DF AUTOMATED     METABOLIC PANEL, BASIC    Collection Time: 06/11/22  5:17 AM   Result Value Ref Range    Sodium 138 136 - 145 mmol/L    Potassium 3.9 3.5 - 5.1 mmol/L    Chloride 108 97 - 108 mmol/L    CO2 21 21 - 32 mmol/L    Anion gap 9 5 - 15 mmol/L    Glucose 79 65 - 100 mg/dL    BUN 8 6 - 20 MG/DL    Creatinine 0.93 0.55 - 1.02 MG/DL    BUN/Creatinine ratio 9 (L) 12 - 20      GFR est AA >60 >60 ml/min/1.73m2    GFR est non-AA >60 >60 ml/min/1.73m2    Calcium 9.2 8.5 - 10.1 MG/DL   GLUCOSE, POC    Collection Time: 06/11/22  6:49 AM   Result Value Ref Range    Glucose (POC) 81 65 - 117 mg/dL    Performed by Rajesh Boss        Assessment:     Hospital Problems  Date Reviewed: 6/9/2022          Codes Class Noted POA    * (Principal) [facility] ([facility] ICD-10-CM: E66.01  ICD-9-CM: 278.01  6/9/2022 Yes              Plan/Recommendations/Medical Decision Making:     - Bariatric liquids  - Pain control  - Encourage ambulation and incentive spirometry  - Plan DC home today  - DC CHRISSIE drain prior to discharge

## 2022-06-11 NOTE — PROGRESS NOTES
Reviewed discharge instructions with patient and answered questions. Placed signature in hard chart.

## 2022-06-11 NOTE — PROGRESS NOTES
Bedside shift change report given to Raquel Shine (oncoming nurse) by Katarzyna Lemus RN (offgoing nurse). Report included the following information SBAR and Kardex.

## 2022-06-13 ENCOUNTER — TELEPHONE (OUTPATIENT)
Dept: SURGERY | Age: 27
End: 2022-06-13

## 2022-06-13 ENCOUNTER — HOSPITAL ENCOUNTER (OUTPATIENT)
Dept: INFUSION THERAPY | Age: 27
Discharge: HOME | End: 2022-06-13
Payer: COMMERCIAL

## 2022-06-13 VITALS
DIASTOLIC BLOOD PRESSURE: 81 MMHG | HEART RATE: 71 BPM | RESPIRATION RATE: 18 BRPM | SYSTOLIC BLOOD PRESSURE: 139 MMHG | TEMPERATURE: 98 F

## 2022-06-13 DIAGNOSIS — R10.13 EPIGASTRIC PAIN: Primary | ICD-10-CM

## 2022-06-13 PROCEDURE — 74011250636 HC RX REV CODE- 250/636: Performed by: NURSE PRACTITIONER

## 2022-06-13 PROCEDURE — 74011000250 HC RX REV CODE- 250: Performed by: NURSE PRACTITIONER

## 2022-06-13 PROCEDURE — C9113 INJ PANTOPRAZOLE SODIUM, VIA: HCPCS | Performed by: NURSE PRACTITIONER

## 2022-06-13 PROCEDURE — 96361 HYDRATE IV INFUSION ADD-ON: CPT

## 2022-06-13 PROCEDURE — 96374 THER/PROPH/DIAG INJ IV PUSH: CPT

## 2022-06-13 PROCEDURE — 96360 HYDRATION IV INFUSION INIT: CPT

## 2022-06-13 PROCEDURE — 96375 TX/PRO/DX INJ NEW DRUG ADDON: CPT

## 2022-06-13 RX ORDER — ONDANSETRON 2 MG/ML
4 INJECTION INTRAMUSCULAR; INTRAVENOUS
Status: DISCONTINUED | OUTPATIENT
Start: 2022-06-13 | End: 2022-06-14 | Stop reason: HOSPADM

## 2022-06-13 RX ORDER — ACETAMINOPHEN 500 MG
500-1000 TABLET ORAL
Status: DISCONTINUED | OUTPATIENT
Start: 2022-06-13 | End: 2022-06-15 | Stop reason: HOSPADM

## 2022-06-13 RX ORDER — ONDANSETRON 2 MG/ML
4 INJECTION INTRAMUSCULAR; INTRAVENOUS ONCE
Status: COMPLETED | OUTPATIENT
Start: 2022-06-13 | End: 2022-06-13

## 2022-06-13 RX ORDER — HYOSCYAMINE SULFATE 0.12 MG/1
0.12 TABLET SUBLINGUAL
Qty: 30 TABLET | Refills: 0 | Status: SHIPPED | OUTPATIENT
Start: 2022-06-13 | End: 2022-07-14 | Stop reason: ALTCHOICE

## 2022-06-13 RX ADMIN — ONDANSETRON 4 MG: 2 INJECTION, SOLUTION INTRAMUSCULAR; INTRAVENOUS at 15:35

## 2022-06-13 RX ADMIN — SODIUM CHLORIDE, SODIUM LACTATE, POTASSIUM CHLORIDE, AND CALCIUM CHLORIDE 1000 ML: 600; 310; 30; 20 INJECTION, SOLUTION INTRAVENOUS at 15:32

## 2022-06-13 RX ADMIN — THIAMINE HYDROCHLORIDE: 100 INJECTION, SOLUTION INTRAMUSCULAR; INTRAVENOUS at 16:33

## 2022-06-13 RX ADMIN — SODIUM CHLORIDE 40 MG: 9 INJECTION INTRAMUSCULAR; INTRAVENOUS; SUBCUTANEOUS at 15:35

## 2022-06-13 NOTE — PROGRESS NOTES
patient seen in [facility] and she was there with support friends   C//o epigastric pain and pressure associated with drinking   \"I'm afraid to drink because it hurts\"   No BM yet   + flatus   VSS   Looks well and is laughing with friends   A + O x 3  Chest  CTA  COR  RRR  ABD Soft, few BS, mild incisional tenderness as expected /ND, lap sites C. D/I and no erythema or induration no masses or hernias.   EXT No edema; ambulating independently    Post op pain - will add levsin SL 1 q 4 hours prn   Warm fluids or room temp liquids to avoid spasm   DC dilaudid as contributing to constipation   Advised to get laxative such as dulcolax or senna and take today   Can use Tylenol and gabapentin as directed, continue with abdominal support and heating pad   Walk frequently and will check on her tomorrow

## 2022-06-13 NOTE — TELEPHONE ENCOUNTER
Pt called wanting to ask a few questions about some post op symptoms she is experiencing. Bypass with Sylvia on 6/9/22. Having a hard time swallowing and in a lot of pain. Also having a hard time keeping up with fluids. .. requesting a call back

## 2022-06-13 NOTE — TELEPHONE ENCOUNTER
Bariatric Post-Operative Phone Calls: 48 hour phone call    Diet:Question of any nausea and/or vomiting. Protein intake (goal is 60 grams of protein daily)   Poor____Fair___x_Good____Great____     Comment:__took in 8 ounces yesterday and only 3 ounces so far today____________________________________________________________      ______________________________________________________________________    Hydration:Less than 32 ounces of water daily is fair to poor (Goal is 64 ounces per day)   Poor____ Fair____ Good__x__Great____    Comment:__took in 48 ounces yesterday and 8 ounces so far today, I spoke with NP and she wants to see it patient can get into [facility]. _________________________________________________________    ______________________________________________________________________      Ambulation:( walking at least 3 x week, for 15- 20 minutes)     Poor______ Fair______ Good___x every 2 hours___     Great______ Comment:__________________________________________________    ______________________________________________________________________      Urine Color: Question of any odor and color(should be renu, pale, and clear) Dark______ Amber______ Pale______      Clear______ Comment:__dark and then clears some_________________________________________________                           ________________________________________________________________    Bowel movements: Question of any constipation- haven't had any bowel movements for more than 3 days. This could be related to protein intake and/or narcotic pain medication usage. Comment:                                                     No bm as yet, using Miralax passing gas                                                                         Pain: Left sided abdominal pain is normal (should be less than 3)  Question if pain medication is helpful.  10___ 9___ 8___ 7_x__ 6___ 5___ 4___ 3___     2___1___0___Comment:_taking the Dilaudid and alternating with 2 Tylenol ________________________________________________    ______________________________________________________________________      Incision: (No redness, pain, swelling or fever) Healing Well___x___     Healed______Redness_________ Pain_________     Swelling_________ Fever__________(greater than 101 needs evaluation)    Comment:____________________________________________________________    ______________________________________________________________________  Use of incentive spirometer: Yes_x 1250___       No           Next Appointment:___6/23/22___________                 Support Group: Yes______No______    Additional Comments:____________________________________________________________    ____________________________________________________________________      If more than one parameter is not met or considered poor, nurse needs to discuss with provider recommend for patient to be seen in the office as soon as possible or refer to the provider for follow-up. Reinforce to patient to use bariatric educational booklet as guide. It is appropriate to refer patient to the nutritionist to discuss more in detail of diet and nutrition.

## 2022-06-13 NOTE — PROGRESS NOTES
[facility] Peds/Adult Note                       Date: 2022    Name: Chanel Kennedy    MRN: [id number]         : 1995    1510 Patient arrives for Hydration without acute problems. Please see [facility] for complete assessment and education provided. Prior to treatment, patient was screened for COVID 19. Patient denies any signs or symptoms of COVID. Denies any known physical contact with anyone diagnosed with, or with pending or positive COVID test. Denies a pending or positive COVID test himself. Vital signs stable throughout and prior to discharge. Patient tolerated procedure well and was discharged without incident. Patient is aware of no further [facility] appointments and to follow up with referring provider for any questions or concerns. Ms. Norm Alvarez vitals were reviewed prior to and after treatment. Patient Vitals for the past 12 hrs:   Temp Pulse Resp BP   22 1736 -- 71 18 139/81   22 1510 98 °F (36.7 °C) 85 20 (!) 142/67       Lab results:  No results found for this or any previous visit (from the past 12 hour(s)). Medications given:   Medications Administered     0.9% sodium chloride 1,000 mL with mvi (adult no. 4 with vit K) 10 mL, thiamine 948 mg, folic acid 1 mg infusion     Admin Date  2022 Action  New Bag Dose   Rate  1,000 mL/hr Route  IntraVENous Administered By  Elsie Juan RN          lactated ringers bolus infusion 1,000 mL     Admin Date  2022 Action  New Bag Dose  1,000 mL Rate  1,000 mL/hr Route  IntraVENous Administered By  Elsie Juan RN          ondansetron [facility]) injection 4 mg     Admin Date  2022 Action  Given Dose  4 mg Route  IntraVENous Administered By  Elsie Juan RN          pantoprazole (PROTONIX) 40 mg in 0.9% sodium chloride 10 mL injection     Admin Date  2022 Action  Given Dose  40 mg Route  IntraVENous Administered By  Elsie Juan RN                Ms. Mj Vizcaino tolerated the infusion, and had no complaints. PIV was flushed and removed per protocol without any difficulty. Ms. Ranjeet Maria was discharged from [place] [address] in stable condition. Discharge Instructions provided to patient, patient verbalized understanding and was given a copy of d/c instructions.      Future Appointments   Date Time Provider Will Uribe   6/23/2022  1:40 PM ADEBAYO Santa   7/11/2022  2:20 PM ADEBAYO Santa   7/21/2022  1:00 PM Ap Raines NP [facility] ZAKIA Marin RN  June 13, 2022  4:15 PM

## 2022-06-13 NOTE — TELEPHONE ENCOUNTER
I called [facility] and they can see patient today or tomorrow. I called the patient and she said she is available to come today at 3 pm. Pt was told to go to [address] [place] [address]. Mary Carmen Alvarenga said she will see patient upstairs when she comes in. Pt in agreement. Orders faxed.

## 2022-06-14 ENCOUNTER — TELEPHONE (OUTPATIENT)
Dept: SURGERY | Age: 27
End: 2022-06-14

## 2022-06-14 NOTE — TELEPHONE ENCOUNTER
I called the patient back to check on her today after getting the IV fluids yesterday. She said she is trying to drink more because she does not want to have to get more IV fluids. She said she feels sluggish today but is up and moving, she said she took the [address] [place] last night before she went to bed and again this morning and she does not see where it has helped at all. She said she still has not had a BM, I told her to increase the Miralax to twice a day and use a Dulcolax supp or a Fleets enema, she thinks that will help the pain once she moves her bowels and I told her it should as well. She is going to work on that today. She said she will call us back if she is not able to drink enough or move her bowels. Pt in agreement.

## 2022-06-16 ENCOUNTER — TELEPHONE (OUTPATIENT)
Dept: SURGERY | Age: 27
End: 2022-06-16

## 2022-06-16 NOTE — TELEPHONE ENCOUNTER
Patient identified with two patient identifiers. Patient informed she will proceed with prescription Vitamin D as ordered for now due to her low vitamin D levels until she is rechecked and then they will decide if she will continue prescription D or start OTC. She can use pill form calcium and the vitamin B12 is purchased over the counter as directed in handbook. Patient expressed understanding will return call if any other questions or concerns.

## 2022-06-16 NOTE — TELEPHONE ENCOUNTER
Patient called stating she has a few questions she would like to discuss with Zoraida Suarez. First, the patient was prescribed Vitamin d2 and in the class she took it said the patient should take vitamin d3. She wants to make sure she is not taking the wrong vitamins. Patient also stated there is a calcium pill and wants to know if she can use this pill instead of the powder. Lastly, the patient wants to know if Vitamin b12 is over the counter of if she needs a prescription for that. Please advise.

## 2022-06-23 ENCOUNTER — OFFICE VISIT (OUTPATIENT)
Dept: SURGERY | Age: 27
End: 2022-06-23
Payer: COMMERCIAL

## 2022-06-23 VITALS
WEIGHT: 293 LBS | DIASTOLIC BLOOD PRESSURE: 70 MMHG | BODY MASS INDEX: 50.02 KG/M2 | HEIGHT: 64 IN | HEART RATE: 88 BPM | RESPIRATION RATE: 20 BRPM | TEMPERATURE: 97.9 F | SYSTOLIC BLOOD PRESSURE: 130 MMHG | OXYGEN SATURATION: 97 %

## 2022-06-23 DIAGNOSIS — K59.01 SLOW TRANSIT CONSTIPATION: ICD-10-CM

## 2022-06-23 DIAGNOSIS — E66.01 MORBID OBESITY WITH BMI OF 50.0-59.9, ADULT (HCC): ICD-10-CM

## 2022-06-23 DIAGNOSIS — K91.2 POSTOPERATIVE INTESTINAL MALABSORPTION: ICD-10-CM

## 2022-06-23 DIAGNOSIS — Z09 FOLLOW-UP EXAMINATION AFTER ABDOMINAL SURGERY: Primary | ICD-10-CM

## 2022-06-23 PROCEDURE — 99024 POSTOP FOLLOW-UP VISIT: CPT | Performed by: NURSE PRACTITIONER

## 2022-06-23 NOTE — PROGRESS NOTES
1. Have you been to the ER, urgent care clinic since your last visit? Hospitalized since your last visit? No    2. Have you seen or consulted any other health care providers outside of the [address] [place] [address] since your last visit? Include any pap smears or colon screening.  No

## 2022-06-23 NOTE — PATIENT INSTRUCTIONS
unflavored protein powder = Genepro and you can find at [facility] [place] or [employer]     Continue to use your protein powder and or shakes every day to meet the 60 grams / day protein goal (minimum)    Liquids are still a priority and aim for at least 64 oz water or other approved clear liquid every day     For constipation take 3 dulcolax today and the start taking 1 tab every day, if no BM after 2 days, take 2-3 tabs     In addition to everything on the Bariatric Liquid diet, you may   add these foods to your diet. Protein - include with every meal   Egg or egg substitute     Low fat or fat-free cottage cheese     Low fat or fat-free yogurt    Low fat Greek yogurt    Fat-free, 1% milk, or Lactaid milk    Low-fat or vegetarian refried beans    Well-cooked beans and lentils   Fat-free or 2% reduced-fat cheese    Hummus    Low fat soup     Snacks/Other Options:   Sugar free fudgsicles    Sugar free cocoa    No sugar added pudding     Fruits and Vegetables   Applesauce (no sugar added)   Canned fruit (no sugar added)   Fresh soft peeled fruits (melons, banana, avocado, berries)   Any soft cooked vegetables    Mashed potatoes, Sweet potatoes, baked potatoes (no skin)    Condiments   Fat free non-stick spray   Herbs and spices   Lite butter, margarine, canola oil, olive oil   Reduced-fat or fat-free hernandez   Reduced-fat or fat-free salad dressing   Reduced-fat or fat-free cream cheese   Reduced-fat or fat-free sour cream   Lemon juice   Salt, pepper, mustard, ketchup, salsa    Prepare food to the appropriate texture. Sample Meal Plan:  Soft and Mushy    Breakfast ½ cup plain oatmeal with protein powder. Add cinnamon, nutmeg, Splenda brown sugar as desired for flavor 20-25 grams protein   Snack (optional) High protein gelatin (recipe on www.[employer]. com) 10 grams protein   Lunch ½ cup low fat cottage cheese or [place] yogurt with soft fruit 10 - 15 grams protein    Snack (optional) High protein pudding or high protein popsicle (recipe on www.[employer]. com) 10 grams protein   Dinner ¼ cup low-fat well cooked beans with low-fat cheese sprinkled on top  ¼ cup no sugar added applesauce (can sprinkle protein powder) 5-8 grams protein  510  grams protein     Key Points   Continue to drink 48-64 ounces of low calorie, non-carbonated, sugar free beverages between meals.  Eat 3 meals per day   Measure each meal to HALF cup per meal   Aim for 60 grams of protein every day. Try food sources of protein first.    Continue to supplement with protein shakes/powder to meet protein goals.  Take small bites. Try eating with smaller utensils (baby spoon, cocktail fork).  Chew food thoroughly   Allow about 30 minutes to eat a meal.  Eating too fast may cause nausea or vomiting.  Stop eating as soon as you feel full. Overeating may stretch your stomach's capacity and prevent desired weight loss.  Do not drink liquids during meals and 30 minutes after meals. Drinking with meals may cause nausea or vomiting.  Add one new food at a time   Take vitamins daily   No lifting greater than 20 lbs.  You can do light jogging and walking.  You may go into a pool. What to do if you are constipated: You may  take Milk of Magnesia. Take 2 Tablespoons followed by 16 oz of water then 2 hours later take another 2 tablespoons. If  milk of magnesia does not work then take [place] or Miralax over the counter. Keep in mind that the Benefiber or Miralax may take a day or two to work. If all of the above do not work try a Fleets enema and follow the directions on the box. If you are not able to tolerate liquids or soft foods. Please call our office  [phone]  If you have vomiting and persistent epigastric pain or chest pain. You should call our office, the doctor on-call or go to the emergency room.

## 2022-06-23 NOTE — PROGRESS NOTES
Chief Complaint   Patient presents with    Post OP Follow Up     S/P  Laparoscopic Levon-en-Y gastric bypass, 6/10/22       Roxy Comp is 2 weeks status post gastric bypass. Presents today for obesity manegment. Patient has lost 30 lbs. Since surgery. Patient is satisfied with progress. Patient is consuming 45+/- grams of protein daily. Patient is drinking 53-64 oz of fluids per day. Bowels moving slowly and the dulcolax has helped   Went to [facility] and feels better; does not hurt as bad to drink   Nausea  no  Regurgitation  \"little bit\"   No fever or chills, chest pain or shortness of breath. Vitamin compliance yes  Activity  Walking every day   Sleep   Good   Pain much improved and she is walking more and keeping up with her toddler     Physical Exam  Visit Vitals  /70 (BP 1 Location: Left lower arm, BP Patient Position: Sitting, BP Cuff Size: Adult)   Pulse 88   Temp 97.9 °F (36.6 °C) (Oral)   Resp 20   Ht 5' 4\" (1.626 m)   Wt 299 lb (135.6 kg)   SpO2 97%   BMI 51.32 kg/m²     A + O x 3, here with spouse and baby   Chest  CTA, unlabored   COR  RRR  ABD Soft, obese, lap sites C/D/I no erythema or induration ; NT/ND, no masses or hernias   EXT No edema; ambulating independently       ICD-10-CM ICD-9-CM    1. Follow-up examination after abdominal surgery  Z09 V67.09    2. Morbid obesity with BMI of 50.0-59.9, adult ([facility])  E66.01 278.01     Z68.43 V85.43    3. Postoperative intestinal malabsorption  K91.2 579.3    4. Slow transit constipation  K59.01 564.01        Roxy Comp is 2 weeks  s/p gastric bypass doing well   Diet soft stage I   Continue PPI   Continue vitamins and protein supplements   Activity daily walking 20 minutes   Sleep hygiene reviewed   Follow-up in 2 weeks   Support group  Roxy Alvarado verbalized understanding and questions were answered to the best of my knowledge and ability. Diet, activity and mindfulness educational materials were provided.     25 minutes spent in face to face with James 91 > 50% counseling.

## 2022-06-30 ENCOUNTER — TELEPHONE (OUTPATIENT)
Dept: SURGERY | Age: 27
End: 2022-06-30

## 2022-06-30 NOTE — TELEPHONE ENCOUNTER
Bariatric Post-Operative Phone Calls: Week 3    Diet:Question of any nausea and/or vomiting. Question of tolerance to diet advancement from liquids to solids. Protein intake (goal is 60 grams of protein daily)   Poor____Fair_X___Good____Great____     Comment: No vomiting some nausea taking zofran. Consumes about 40 gms protein shake, eating peas, mashed potatoes, green beans ______________________________________________________________      ______________________________________________________________________    Hydration:Less than 32 ounces of water daily is fair to poor (Goal is 64 ounces per day)   Poor____ Fair__X__ Good____Great____    Comment:_Drinks 24 oz water a day drinks gatorade zero 24 oz _____________________________________________________________    ______________________________________________________________________      Ambulation:( walking at least 3 x week, for at least 30 minutes)   Poor______ Fair______ Good_X_____     Great______ Comment:_Walking daily 1 hr_________________________________________________    ______________________________________________________________________      Urine Color: Question of any odor and color(should be renu, pale, and clear) Dark______ Amber______ Pale______      Clear_X_____ Comment:_No issues voiding__________________________________________________                           ________________________________________________________________    Bowel movements: Question of any constipation- haven't had any bowel movements for more than 3 days. This could be related to protein intake and/or narcotic pain medication usage. Comment:                                                       She has BM every 3 days she is taking dulcolax 1 time a day no gas or bloating                                                                       Pain: Left sided abdominal pain is normal (should be less than 3)         Question if pain medication is helpful.  10___ 9___ 8___ 7___ 6___ 5___ 4___ 3___     2___1___0X___Comment:_No c/o pain________________________________________________    ______________________________________________________________________      Incision: (No redness, pain, swelling or fever) Healing Well_X_____     Healed______Redness_________ Pain_________     Swelling_________ Fever__________(greater than 101 needs evaluation)    Comment:_No redness swelling or drainage___________________________________________________________    ______________________________________________________________________  Use of incentive spirometer: Yes_X___       No           Next Appointment:_7/14/22 at _1:40 PM____________                 Support Group: Yes_X_____No______    Additional Comments:_None___________________________________________________________    ____________________________________________________________________      If more than one parameter is not met or considered poor, nurse needs to discuss with provider recommend for patient to be seen in the office as soon as possible or refer to the provider for follow-up. Reinforce to patient to use bariatric educational booklet as guide. It is appropriate to refer patient to the nutritionist to discuss more in detail of diet and nutrition.

## 2022-07-14 ENCOUNTER — OFFICE VISIT (OUTPATIENT)
Dept: SURGERY | Age: 27
End: 2022-07-14
Payer: COMMERCIAL

## 2022-07-14 VITALS
BODY MASS INDEX: 50.02 KG/M2 | SYSTOLIC BLOOD PRESSURE: 114 MMHG | HEART RATE: 72 BPM | TEMPERATURE: 98 F | RESPIRATION RATE: 18 BRPM | DIASTOLIC BLOOD PRESSURE: 62 MMHG | WEIGHT: 293 LBS | OXYGEN SATURATION: 98 % | HEIGHT: 64 IN

## 2022-07-14 DIAGNOSIS — Z98.890 POST-OPERATIVE NAUSEA AND VOMITING: ICD-10-CM

## 2022-07-14 DIAGNOSIS — Z09 FOLLOW-UP EXAMINATION AFTER ABDOMINAL SURGERY: Primary | ICD-10-CM

## 2022-07-14 DIAGNOSIS — R13.19 INTERMITTENT DYSPHAGIA: ICD-10-CM

## 2022-07-14 DIAGNOSIS — R11.2 POST-OPERATIVE NAUSEA AND VOMITING: ICD-10-CM

## 2022-07-14 DIAGNOSIS — R10.13 EPIGASTRIC PAIN: ICD-10-CM

## 2022-07-14 DIAGNOSIS — E66.01 MORBID OBESITY WITH BMI OF 50.0-59.9, ADULT (HCC): ICD-10-CM

## 2022-07-14 DIAGNOSIS — K91.2 POSTOPERATIVE INTESTINAL MALABSORPTION: ICD-10-CM

## 2022-07-14 DIAGNOSIS — R68.81 EARLY SATIETY: ICD-10-CM

## 2022-07-14 DIAGNOSIS — R14.2 BELCHING: ICD-10-CM

## 2022-07-14 PROCEDURE — 99024 POSTOP FOLLOW-UP VISIT: CPT | Performed by: NURSE PRACTITIONER

## 2022-07-14 RX ORDER — OMEPRAZOLE 40 MG/1
40 CAPSULE, DELAYED RELEASE ORAL DAILY
Qty: 90 CAPSULE | Refills: 1 | Status: SHIPPED | OUTPATIENT
Start: 2022-07-14 | End: 2022-09-08 | Stop reason: ALTCHOICE

## 2022-07-14 NOTE — PROGRESS NOTES
1. Have you been to the ER, urgent care clinic since your last visit? Hospitalized since your last visit? No    2. Have you seen or consulted any other health care providers outside of the [address] [place] [address] since your last visit? Include any pap smears or colon screening.  No

## 2022-07-14 NOTE — PATIENT INSTRUCTIONS
Next steps:      Outpatient IV hydration     You must, must work on drinking more throughout the day. Try using a sippy cup and always have it with you. The GI office will contact you about the endoscopy to arrange a date and time. You will need a  as you will be sedated. [phone] Dr. Steven Gonzalez     Stay on the Omeprazole 40 mg every day for your stomach     Ok to use the protein shots       Ok to try a few of these foods IF very, very moist     Rely on soups and your other soft foods mostly       Moist Meats   What is this diet?  This phase adds moist meats in addition to foods in Soft & Mushy   Lean protein sources       What new foods can I eat?  Moist meat and poultry   Moist fish and seafood    Shopping Lists      Protein - include with every meal   Tuna packed in water   Hybrent (canned, frozen, fresh)    White flaky fish (deidra, cod, flounder, tilapia)    Canned chicken packed in water     96-99% fat free thinly sliced deli meat (ham, turkey, chicken)    Silken Tofu    Skinless turkey or chicken (prepare to a soft texture)    Lean ground meat   Lean pork (cooked until very tender, cut into small pieces)     Sample Meal Plan:  Moist Meats    Breakfast ½ cup soft cooked eggs (2) 16 grams protein   Snack (optional) Low-fat string cheese 5 grams protein   Lunch 2 slices of lean deli turkey (2 oz.), ¼ cup soft fruit or  ½ cup tuna salad made with low-fat mayonnaise 14 grams protein   14 grams protein   Snack (optional) Greek yogurt or low-fat cottage cheese or low-fat string cheese 8-15 grams protein   Dinner Soft/flaky fish (2 oz.)  ¼ cup soft cooked vegetables 14 grams protein       Key Points     AVOID REHEATING meats as it gets too dry    Continue to drink 48-64 ounces of low calorie, non-carbonated, sugar free beverages between meals.  Eat 3 meals per day   Measure each meal to HALF cup per meal   Aim for 60 grams of protein every day.   Try food sources of protein first.    Continue to supplement with protein shakes/powder to meet protein goals.  Take small bites. Try eating with smaller utensils (baby spoon, cocktail fork).  Chew food thoroughly   Allow about 30 minutes to eat a meal.  Eating too fast may cause nausea or vomiting.  Stop eating as soon as you feel full. Overeating may stretch your stomach's capacity and prevent desired weight loss.  Do not drink liquids during meals and 30 minutes after meals. Drinking with meals may cause nausea or vomiting.  Add one new food at a time   Take vitamins daily   No lifting greater than 40 lbs.  You can do light jogging and walking.  You may go into a pool. What to do if you are constipated: You may  take Milk of Magnesia. Take 2 Tablespoons followed by 16 oz of water then 2 hours later take another 2 tablespoons. If  milk of magnesia does not work then take [place] or Miralax over the counter. Keep in mind that the Benefiber or Miralax may take a day or two to work. If all of the above do not work try a Fleets enema and follow the directions on the box. If you are not able to tolerate liquids or soft foods. Please call our office  [phone]  If you have vomiting and persistent epigastric pain or chest pain. You should call our office, the doctor on-call or go to the emergency room. If you would like to make an appointment with one of the bariatric dietitians, please call the bariatric line at [phone]. Appointments are offered in person and virtual at no charge. Upper GI Endoscopy: Before Your Procedure  What is an upper GI endoscopy? An upper gastrointestinal (or GI) endoscopy is a test that allows your doctor to look at the inside of your esophagus, stomach, and the first part of your small intestine, called the duodenum. The esophagus is the tube that carries food to your stomach. The doctor uses a thin, lighted tube that bends.  It is called an endoscope, or scope.  The doctor puts the tip of the scope in your mouth and gently moves it down your throat. The scope is a flexible video camera. The doctor looks at a monitor (like a TV set or a computer screen) as he or she moves the scope. A doctor may do this procedure to look for ulcers, tumors, infection, or bleeding. It also can be used to look for signs of acid backing up into your esophagus. This is called gastroesophageal reflux disease, or GERD. The doctor can use the scope to take a sample of tissue for study (a biopsy). The doctor also can use the scope to take out growths or stop bleeding. Follow-up care is a key part of your treatment and safety. Be sure to make and go to all appointments, and call your doctor if you are having problems. It's also a good idea to know your test results and keep a list of the medicines you take. How do you prepare for the procedure? Procedures can be stressful. This information will help you understand what you can expect. And it will help you safely prepare for your procedure. Preparing for the procedure    · Do not eat or drink anything for 6 to 8 hours before the test. An empty stomach helps your doctor see your stomach clearly during the test. It also reduces your chances of vomiting. If you vomit, there is a small risk that the vomit could enter your lungs. (This is called aspiration.) If the test is done in an emergency, a tube may be inserted through your nose or mouth to empty your stomach.     · Do not take sucralfate (Carafate) or antacids on the day of the test. These medicines can make it hard for your doctor to see your upper GI tract.     · If your doctor tells you to, stop taking iron supplements 7 to 14 days before the test.     · Be sure you have someone to take you home.  Anesthesia and pain medicine will make it unsafe for you to drive or get home on your own.     · Understand exactly what procedure is planned, along with the risks, benefits, and other options. · Tell your doctor ALL the medicines, vitamins, supplements, and herbal remedies you take. Some may increase the risk of problems during your procedure. Your doctor will tell you if you should stop taking any of them before the procedure and how soon to do it.     · If you take aspirin or some other blood thinner, ask your doctor if you should stop taking it before your procedure. Make sure that you understand exactly what your doctor wants you to do. These medicines increase the risk of bleeding.     · Make sure your doctor and the hospital have a copy of your advance directive. If you don't have one, you may want to prepare one. It lets others know your health care wishes. It's a good thing to have before any type of surgery or procedure. What happens on the day of the procedure? · Follow the instructions exactly about when to stop eating and drinking. If you don't, your procedure may be canceled. If your doctor told you to take your medicines on the day of the procedure, take them with only a sip of water.     · Take a bath or shower before you come in for your procedure. Do not apply lotions, perfumes, deodorants, or nail polish.     · Take off all jewelry and piercings. And take out contact lenses, if you wear them. At the hospital or surgery center   · Bring a picture ID.     · The test may take 15 to 30 minutes.     · The doctor may spray medicine on the back of your throat to numb it. You also will get medicine to prevent pain and to relax you.     · You will lie on your left side. The doctor will put the scope in your mouth and toward the back of your throat. The doctor will tell you when to swallow. This helps the scope move down your throat. You will be able to breathe normally. The doctor will move the scope down your esophagus into your stomach.  The doctor also may look at the duodenum.     · If your doctor wants to take a sample of tissue for a biopsy, he or she may use small surgical tools, which are put into the scope, to cut off some tissue. You will not feel a biopsy, if one is taken. The doctor also can use the tools to stop bleeding or to do other treatments, if needed.     · You will stay at the hospital or surgery center for 1 to 2 hours until the medicine you were given wears off. What happens after an upper GI endoscopy? · After the test, you may belch and feel bloated for a while.     · You may have a tickling, dry throat or mouth. You may feel a bit hoarse, and you may have a mild sore throat. These symptoms may last several days. Throat lozenges and warm saltwater gargles can help relieve the throat symptoms.     · Ask your doctor when you can drive again.     · Your doctor will tell you when you can go back to your usual diet and activities.     · Don't drink alcohol for 12 to 24 hours after the test.   When should you call your doctor? · You have questions or concerns.     · You don't understand how to prepare for your procedure.     · You become ill before the procedure (such as fever, flu, or a cold).     · You need to reschedule or have changed your mind about having the procedure. Where can you learn more? Go to http://www.gray.com/  Enter P790 in the search box to learn more about \"Upper GI Endoscopy: Before Your Procedure. \"  Current as of: September 8, 2021               Content Version: 13.2  © 4073-5344 [employer]. Care instructions adapted under license by [employer] (which disclaims liability or warranty for this information). If you have questions about a medical condition or this instruction, always ask your healthcare professional. [address] any warranty or liability for your use of this information.

## 2022-07-14 NOTE — PROGRESS NOTES
Chief Complaint   Patient presents with    Post OP Follow Up     4 wk Gastric Bypass down 6 lbs twl = 36 lbs        Zay Garcia is 4 weeks status post gastric bypass. Presents today for obesity management. Patient has lost 36 lbs. Since surgery. Continues to struggle with po intake, intermittent nausea, epigastric pain and dysphagia. Reports \"feel stuffed after 2 bites of anything\"   Has not had anything to drink today and it is afternoon. Vomited last night after trying bite of crab dipped in a sauce that was spicy. Thought it was the spice and stomach \"hurt so bad\"   No hematemesis   Taking Omeprazole 40 mg every day   Pain lasted about 20-30 minutes and was crampy and sharp in the epigastric area   Increased belching   Denies NSAIDS, tobacco and ETOH   Patient is consuming < 30 grams of protein daily. She has been using protein shots (3 oz drink) that is 15 grams protein, some soup and can tolerate about 1/2 cup chicken noodle soup over 2 hours. Patient is drinking < 32 oz of fluids per day. Urine is dark yellow     Bowels moving slowly, \"but I am going\"   No fever or chills, chest pain or shortness of breath. Vitamin compliance Yes taking bariatric vitamins every day   Activity  Some walking   Sleep   Ok     Physical Exam  Visit Vitals  /62 (BP 1 Location: Right arm, BP Patient Position: Sitting, BP Cuff Size: Large adult)   Pulse 72   Temp 98 °F (36.7 °C) (Oral)   Resp 18   Ht 5' 4\" (1.626 m)   Wt 293 lb 3.2 oz (133 kg)   SpO2 98%   BMI 50.33 kg/m²     A + O x 3, here with fiance   Chest  CTA, unlabored   COR  RRR  ABD Soft, obese, lap sites well healed; NT/ND, no masses or hernias   EXT No edema; ambulating independently       ICD-10-CM ICD-9-CM    1. Follow-up examination after abdominal surgery  Z09 V67.09    2. Morbid obesity with BMI of 50.0-59.9, adult ([facility])  E66.01 278.01     Z68.43 V85.43    3. Post-operative nausea and vomiting  R11.2 787.01     Z98.890     4. Postoperative intestinal malabsorption  K91.2 579.3    5. Intermittent dysphagia  R13.19 787.29    6. Early satiety  R68.81 780.94    7. Belching  R14.2 787.3        Candi Coon is 4 weeks  s/p gastric bypass with epigastric pain, intermittent nausea and dysphagia, early satiety   Concern for stricture and or ulcer   EGD +/- dilatation asap and referred to Dr. Aguirre Screen PPI every day and reminded no NSAIDS   [facility]IC tomorrow morning for bariatric hydration protocol, PPI and antiemetic   Diet liquids are priority and suggested trying a sippy cup and always have it with her   Continue soft diet as tolerated   Continue vitamins and protein supplements   Activity daily walking   Sleep hygiene reviewed   Follow-up in 2 weeks after EGD, sooner if needed   Phone follow up on Monday    Support group  Candi Coon verbalized understanding and questions were answered to the best of my knowledge and ability. Diet, activity and mindfulness educational materials were provided. 25 minutes spent in face to face with Candi Coon > 50% counseling.

## 2022-07-14 NOTE — Clinical Note
Please touch base with her 7/25/22 follow up from [facility] and status of EGD - thanks ARCHIE

## 2022-07-15 ENCOUNTER — TELEPHONE (OUTPATIENT)
Dept: SURGERY | Age: 27
End: 2022-07-15

## 2022-07-15 ENCOUNTER — HOSPITAL ENCOUNTER (OUTPATIENT)
Dept: INFUSION THERAPY | Age: 27
Discharge: HOME | End: 2022-07-15
Payer: COMMERCIAL

## 2022-07-15 VITALS
TEMPERATURE: 97.4 F | RESPIRATION RATE: 18 BRPM | SYSTOLIC BLOOD PRESSURE: 129 MMHG | HEART RATE: 78 BPM | DIASTOLIC BLOOD PRESSURE: 92 MMHG

## 2022-07-15 PROCEDURE — 74011250636 HC RX REV CODE- 250/636: Performed by: NURSE PRACTITIONER

## 2022-07-15 PROCEDURE — 96365 THER/PROPH/DIAG IV INF INIT: CPT

## 2022-07-15 PROCEDURE — C9113 INJ PANTOPRAZOLE SODIUM, VIA: HCPCS | Performed by: NURSE PRACTITIONER

## 2022-07-15 PROCEDURE — 96375 TX/PRO/DX INJ NEW DRUG ADDON: CPT

## 2022-07-15 PROCEDURE — 74011000250 HC RX REV CODE- 250: Performed by: NURSE PRACTITIONER

## 2022-07-15 PROCEDURE — 96361 HYDRATE IV INFUSION ADD-ON: CPT

## 2022-07-15 PROCEDURE — 96360 HYDRATION IV INFUSION INIT: CPT

## 2022-07-15 RX ORDER — ONDANSETRON 2 MG/ML
4 INJECTION INTRAMUSCULAR; INTRAVENOUS
Status: DISCONTINUED | OUTPATIENT
Start: 2022-07-15 | End: 2022-07-16 | Stop reason: HOSPADM

## 2022-07-15 RX ORDER — ACETAMINOPHEN 500 MG
500-1000 TABLET ORAL
Status: DISCONTINUED | OUTPATIENT
Start: 2022-07-15 | End: 2022-07-16 | Stop reason: HOSPADM

## 2022-07-15 RX ORDER — ONDANSETRON 2 MG/ML
4 INJECTION INTRAMUSCULAR; INTRAVENOUS ONCE
Status: COMPLETED | OUTPATIENT
Start: 2022-07-15 | End: 2022-07-15

## 2022-07-15 RX ADMIN — THIAMINE HYDROCHLORIDE: 100 INJECTION, SOLUTION INTRAMUSCULAR; INTRAVENOUS at 11:28

## 2022-07-15 RX ADMIN — ONDANSETRON 4 MG: 2 INJECTION, SOLUTION INTRAMUSCULAR; INTRAVENOUS at 10:17

## 2022-07-15 RX ADMIN — SODIUM CHLORIDE, SODIUM LACTATE, POTASSIUM CHLORIDE, AND CALCIUM CHLORIDE 1000 ML: 600; 310; 30; 20 INJECTION, SOLUTION INTRAVENOUS at 10:05

## 2022-07-15 RX ADMIN — SODIUM CHLORIDE 40 MG: 9 INJECTION, SOLUTION INTRAMUSCULAR; INTRAVENOUS; SUBCUTANEOUS at 10:14

## 2022-07-15 NOTE — TELEPHONE ENCOUNTER
Returned call to patient and verified using 2 patient identifiers. She states that she completed her IV infusion for dehydration but she feels weird after getting it and hard to explain but feels \"out of sorts\". She was sitting with a client and started feeling groggy and tired this way but is now headed home. Denies any blurred, vision or dizziness. Rec: that best to go home and rest. She is to call the office back any concerns. Will make Provider aware of this call and get back with her with any other recommendations. Pt voiced understandings.

## 2022-07-15 NOTE — PROGRESS NOTES
[facility] Peds/Adult Note                       Date: July 15, 2022    Name: Puja Cain    MRN: [id number]         : 1995    0920 Patient arrives for Hydration without acute problems. Please see [facility] for complete assessment and education provided. Prior to treatment, patient was screened for COVID 19. Patient denies any signs or symptoms of COVID. Denies any known physical contact with anyone diagnosed with, or with pending or positive COVID test. Denies a pending or positive COVID test himself. Vital signs stable throughout and prior to discharge. Patient tolerated procedure well and was discharged without incident. Patient is aware of no further [facility] appointments and to follow up with referring provider for any questions or concerns. Ms. Laura Janeth vitals were reviewed prior to and after treatment. Patient Vitals for the past 12 hrs:   Temp Pulse Resp BP   07/15/22 1230 -- 78 18 (!) 129/92   07/15/22 0934 97.4 °F (36.3 °C) 76 18 (!) 148/75       Medications given:   Medications Administered     0.9% sodium chloride 1,000 mL with mvi (adult no. 4 with vit K) 10 mL, thiamine 764 mg, folic acid 1 mg infusion     Admin Date  07/15/2022 Action  New Bag Dose   Rate  1,000 mL/hr Route  IntraVENous Administered By  Barrington Morrison RN          lactated ringers bolus infusion 1,000 mL     Admin Date  07/15/2022 Action  New Bag Dose  1,000 mL Rate  1,000 mL/hr Route  IntraVENous Administered By  Barrington Morrison RN          ondansetron [facility]) injection 4 mg     Admin Date  07/15/2022 Action  Given Dose  4 mg Route  IntraVENous Administered By  Barrington Morrison RN          pantoprazole (PROTONIX) 40 mg in 0.9% sodium chloride 10 mL injection     Admin Date  07/15/2022 Action  Given Dose  40 mg Route  IntraVENous Administered By  Barrington Morrison RN                Ms. Yuniel Brown tolerated the infusions and injections, and had no complaints.   PIV was flushed and removed per protocol without any difficulty. Ms. Raeann Perez was discharged from [place] [address] in stable condition. Discharge Instructions provided to patient, patient verbalized understanding but denied the request for a copy of d/c instructions.      Future Appointments   Date Time Provider Will Uribe   7/21/2022  1:00 PM Peter Chin NP [facility] BS ROBBIE Chambers RN  July 15, 2022  1:18 PM

## 2022-07-15 NOTE — TELEPHONE ENCOUNTER
Patient called and said she does not feel good at all since she left and hour ago from her infusion of fluids.  Patient said she does not feel like herself and wants to know if this is normal.

## 2022-07-15 NOTE — PROGRESS NOTES
Faxed referral to [facility] with confirmation.  The office will call patient to schedule appointment for the EGD with first available MD.

## 2022-07-15 NOTE — TELEPHONE ENCOUNTER
Returned call to patient and verified using 2 patient identifiers. Informed Provider aware and agreed with recommendations and will follow up with her on Monday. Pt voiced understandings. No other concerns were voiced.

## 2022-07-18 ENCOUNTER — TELEPHONE (OUTPATIENT)
Dept: SURGERY | Age: 27
End: 2022-07-18

## 2022-07-18 NOTE — TELEPHONE ENCOUNTER
I called the patient and she said she rested all weekend and started to feel better yesterday she is drinking different clear liquids and she said she has already gotten in 32 ounces so far today. She feels like she will get in the other 32 ounces by the time she goes to bed. She said she feels a lot better, I asked her has she heard from GI and she said not yet, referral was faxed on Friday. I gave her the number to their office for Casper Lozano if she has not heard from [facility] by tomorrow  To mateo them or me if she is not able to get scheduled. Pt in agreement.

## 2022-07-21 ENCOUNTER — VIRTUAL VISIT (OUTPATIENT)
Dept: SURGERY | Age: 27
End: 2022-07-21
Payer: COMMERCIAL

## 2022-07-21 VITALS — WEIGHT: 291 LBS | HEIGHT: 64 IN | BODY MASS INDEX: 49.68 KG/M2

## 2022-07-21 DIAGNOSIS — K91.2 POSTOPERATIVE INTESTINAL MALABSORPTION: ICD-10-CM

## 2022-07-21 DIAGNOSIS — E66.01 MORBID OBESITY WITH BMI OF 45.0-49.9, ADULT (HCC): ICD-10-CM

## 2022-07-21 DIAGNOSIS — K59.01 SLOW TRANSIT CONSTIPATION: ICD-10-CM

## 2022-07-21 DIAGNOSIS — R11.2 POST-OPERATIVE NAUSEA AND VOMITING: ICD-10-CM

## 2022-07-21 DIAGNOSIS — Z98.890 POST-OPERATIVE NAUSEA AND VOMITING: ICD-10-CM

## 2022-07-21 DIAGNOSIS — Z09 FOLLOW-UP EXAMINATION AFTER ABDOMINAL SURGERY: Primary | ICD-10-CM

## 2022-07-21 DIAGNOSIS — R13.19 INTERMITTENT DYSPHAGIA: ICD-10-CM

## 2022-07-21 PROCEDURE — 99024 POSTOP FOLLOW-UP VISIT: CPT | Performed by: NURSE PRACTITIONER

## 2022-07-21 NOTE — Clinical Note
Can you please call GI and see if they can do better than 8/12/22 for an EGD. They got her in with Vania at [facility].  Ugh - worried she has a stricture = thanks JM

## 2022-07-21 NOTE — Clinical Note
Can you please reach out to GI. She needs sooner EGD + dilatation.   They got her in 8/12 and she should not wait 3 weeks - thanks JM

## 2022-07-21 NOTE — Clinical Note
Add appt 8/4/22 @ 1:20 pm .  This is a virtual  visit.   Thanks- ARCHIE HENLEY RNY follow up from EGD

## 2022-07-21 NOTE — PROGRESS NOTES
1. Have you been to the ER, urgent care clinic since your last visit? Hospitalized since your last visit? No    2. Have you seen or consulted any other health care providers outside of the [address] [place] [address] since your last visit? Include any pap smears or colon screening.  No

## 2022-07-22 ENCOUNTER — DOCUMENTATION ONLY (OUTPATIENT)
Dept: SURGERY | Age: 27
End: 2022-07-22

## 2022-07-22 NOTE — PATIENT INSTRUCTIONS
I will ask our nurse to give the GI office a call to try to get you in sooner than August 12. In the meantime keep working on your fluids as your liquids are your priority. Continue with a few bites of food that she can tolerate and for now the protein chips. If you cannot tolerate liquids and or vomiting, please call the office at [phone] to be evaluated. Continue with your vitamins and the omeprazole daily    I scheduled a follow-up for you for 8/4/2022 at 1:20 PM and this is a virtual visit. If you have not had your endoscopy by that appointment please reschedule.

## 2022-07-22 NOTE — PROGRESS NOTES
I called I to see if [facility] appointment on 8/12/22 could be moved up sooner. I spoke with Eliseo Levi she states patient has scheduled appointment with Dr Christopher Hawkins on 8/12/22. She will send a message to the Team someone will call back.

## 2022-07-22 NOTE — PROGRESS NOTES
I was in the office while conducting this encounter. Consent:  He and/or his healthcare decision maker is aware that this patient-initiated Telehealth encounter is a billable service, with coverage as determined by his insurance carrier. He is aware that he may receive a bill and has provided verbal consent to proceed: No - Not billable    This virtual visit was conducted via [employer] me. Pursuant to the emergency declaration under the [address] [place] [address], [facility] waiver authority and the [employer] and [employer] Act, this Virtual  Visit was conducted to reduce the patient's risk of exposure to COVID-19 and provide continuity of care for an established patient. Services were provided through a video synchronous discussion virtually to substitute for in-person clinic visit. Due to this being a TeleHealth evaluation, many elements of the physical examination are unable to be assessed. Total Time: minutes: 21-30 minutes. James Huddleston, was evaluated through a synchronous (real-time) audio-video encounter. The patient (or guardian if applicable) is aware that this is a billable service, which includes applicable co-pays. This Virtual Visit was conducted with patient's (and/or legal guardian's) consent. The visit was conducted pursuant to the emergency declaration under the [address] [place] [address], [address] [place] [address] waiver authority and the [employer] and [employer] Act. Patient identification was verified, and a caregiver was present when appropriate. The patient was located in a state where the provider was licensed to provide care.        Chief Complaint   Patient presents with    Post OP Follow Up     6 wk  Laparoscopic Levon-en-Y gastric bypass  down 2 lbs twl = 38 lbs [phone]     James Huddleston is now 6-week status post laparoscopic gastric bypass for treatment of morbid obesity. She has had some symptoms consistent with a stricture, experiencing intermittent dysphagia, very early satiety and some nausea. I had requested an EGD last time I saw her and she said they could not get her in till the end of August.  She expressed to them the urgency of her getting taken care of so they moved her appointment up to August 12. I would like to try to get this done sooner than that. She is still very limited with her diet and can tolerate a few bites at a time and then has to stop. She has not been able to advance her diet beyond mostly liquids and mush. She said she is doing better drinking her fluids because she is getting up earlier in the day so she is able to take in at least 32 ounces per day sipping all day long. She stopped taking her psychiatric medications because it was \"too much\" and she has a hard time getting everything down. She can tolerate about 4 spoonfuls of something like a yogurt and then feels stuffed. She did find some protein chips that she basically pulverize is in her mouth and \"those go down fine\". No pain  No fever, chills, chest pain or shortness of breath  She has been to outpatient infusion twice  Some intermittent dizziness and feeling lightheaded  She is taking her bariatric vitamin supplements  Visit Vitals  Ht 5' 4\" (1.626 m)   Wt 291 lb (132 kg)   BMI 49.95 kg/m²     Appears to be in no distress  Speech is clear breathing is unlabored  Mucous membranes appear moist  Abdomen appears well-healed    ICD-10-CM ICD-9-CM    1. Follow-up examination after abdominal surgery  Z09 V67.09       2. Postoperative intestinal malabsorption  K91.2 579.3       3. Intermittent dysphagia  R13.19 787.29       4. Post-operative nausea and vomiting  R11.2 787.01     Z98.890        5. Slow transit constipation  K59.01 564.01       6.  Morbid obesity with BMI of 45.0-49.9, adult ([facility])  E66.01 278.01     Z68.42 V85.42         6-week status post laparoscopic gastric bypass with concern for gastrojejunal stricture, or ulcer  Needs an upper endoscopy with possible dilatation and I will ask our nurse to call the GI office to see if they can get her in sooner than August 12. Continue acid suppression  Continue with bariatric full liquid diet and a few bites of soft she can tolerate  Protein chips are not ideal; however, she is able to get them down and at least is getting 20 g of protein  Applauded her efforts with regard to self-care  Follow-up with me after the endoscopy and I did make an appointment for 8/4/2022 at 1:20 PM virtual visit and hopefully she will have her endoscopy before then  If she is not able to drink liquids she is to give our office a call promptly and I will get her into the outpatient infusion center again  [facility] [address] verbalized understanding and questions were answered to the best of my knowledge and ability. Staying hydrated  educational materials were provided.

## 2022-07-25 NOTE — PROGRESS NOTES
Patient  has an appointment for EGD on 7/28/22 at [facility] with Dr Keren Wright.

## 2022-07-25 NOTE — PROGRESS NOTES
I called and spoke to Nell Borges in GI and told her we are trying to get her in before 8/12/22 and she said everyone in the group is full, she said she will speak to her supervisor and call me back so we will know if we need to send her to [facility] again, will await callback.

## 2022-08-04 ENCOUNTER — VIRTUAL VISIT (OUTPATIENT)
Dept: SURGERY | Age: 27
End: 2022-08-04
Payer: COMMERCIAL

## 2022-08-04 VITALS — BODY MASS INDEX: 49.26 KG/M2 | WEIGHT: 287 LBS

## 2022-08-04 DIAGNOSIS — E66.01 MORBID OBESITY WITH BMI OF 45.0-49.9, ADULT (HCC): ICD-10-CM

## 2022-08-04 DIAGNOSIS — K91.2 POSTOPERATIVE INTESTINAL MALABSORPTION: ICD-10-CM

## 2022-08-04 DIAGNOSIS — Z09 FOLLOW-UP EXAMINATION AFTER ABDOMINAL SURGERY: Primary | ICD-10-CM

## 2022-08-04 DIAGNOSIS — R11.2 POST-OPERATIVE NAUSEA AND VOMITING: ICD-10-CM

## 2022-08-04 DIAGNOSIS — Z98.890 POST-OPERATIVE NAUSEA AND VOMITING: ICD-10-CM

## 2022-08-04 PROCEDURE — 99024 POSTOP FOLLOW-UP VISIT: CPT | Performed by: NURSE PRACTITIONER

## 2022-08-04 NOTE — PROGRESS NOTES
1. Have you been to the ER, urgent care clinic since your last visit? Hospitalized since your last visit? No    2. Have you seen or consulted any other health care providers outside of the [address] [place] [address] since your last visit? Include any pap smears or colon screening.  No

## 2022-08-04 NOTE — PROGRESS NOTES
I was in the office while conducting this encounter. Consent:  He and/or his healthcare decision maker is aware that this patient-initiated Telehealth encounter is a billable service, with coverage as determined by his insurance carrier. He is aware that he may receive a bill and has provided verbal consent to proceed: No - Not billable    This virtual visit was conducted via Doxy. me. Pursuant to the emergency declaration under the [address] [place] [address], [facility] waiver authority and the [employer] and [employer] Act, this Virtual  Visit was conducted to reduce the patient's risk of exposure to COVID-19 and provide continuity of care for an established patient. Services were provided through a video synchronous discussion virtually to substitute for in-person clinic visit. Due to this being a TeleHealth evaluation, many elements of the physical examination are unable to be assessed. Total Time: minutes: 11-20 minutes. Dima Suarez, was evaluated through a synchronous (real-time) audio-video encounter. The patient (or guardian if applicable) is aware that this is a billable service, which includes applicable co-pays. This Virtual Visit was conducted with patient's (and/or legal guardian's) consent. The visit was conducted pursuant to the emergency declaration under the [address] [place] [address], [address] [place] [address] waiver authority and the [employer] and [employer] Act. Patient identification was verified, and a caregiver was present when appropriate. The patient was located in a state where the provider was licensed to provide care.        Chief Complaint   Patient presents with    Morbid Obesity     Annual follow up, S/P  Laparoscopic Levon-en-Y gastric bypass, 6/10/22 [phone] (Mobile)       Dima Suarez presents 7 to 8-week status post laparoscopic gastric bypass for treatment of morbid obesity. She had an upper endoscopy a week or so ago when she says it was negative. She said there was no stricture or ulcer. She said she is trying to do better with her eating and eating real food. She does report that sugar \"really bothers me\". Nausea is better. Not actively vomiting. She is doing better with her fluids and is found that she likes diet green tea she is getting in at least 32 ounces per day  Using laxatives occasionally to have a bowel movement every couple of days  No abdominal pain  She has not resumed her depression medications  No fever, chills, chest pain or shortness of breath  She is taking her bariatric vitamins  Visit Vitals  Wt 287 lb (130.2 kg)   BMI 49.26 kg/m²     Appears well  Speech is clear breathing is unlabored  Mucous membranes appear moist  Abdomen appears soft and well-healed    ICD-10-CM ICD-9-CM    1. Follow-up examination after abdominal surgery  Z09 V67.09       2. Postoperative intestinal malabsorption  K91.2 579.3       3. Morbid obesity with BMI of 45.0-49.9, adult ([facility])  E66.01 278.01     Z68.42 V85.42       4. Post-operative nausea and vomiting  R11.2 787.01     Z98.890          A week status post laparoscopic gastric bypass for treatment of morbid obesity with difficulty progressing diet due to intermittent dysphagia, nausea and vomiting  EGD was negative for stricture and/or ulcer  I like her to stay on acid suppression for now  Think some of it is behavioral and I have referred her to the dietitian for additional support  Continue with bariatric vitamin supplements  She has an appointment today with her psychologist I like her to discuss  resuming her antidepressants  Continue with daily walking and self-care  Follow-up in 9/8/2022 at [facility] verbalized understanding and questions were answered to the best of my knowledge and ability. Diet and activity  educational materials were provided.

## 2022-08-05 NOTE — PATIENT INSTRUCTIONS
Next appt 9/8/22 at 11 am     Please make an  appointment with one of the bariatric dietitians, please call the bariatric line at [phone]. Appointments are offered in person and virtual at no charge.

## 2022-09-07 ENCOUNTER — TELEPHONE (OUTPATIENT)
Dept: SURGERY | Age: 27
End: 2022-09-07

## 2022-09-07 NOTE — TELEPHONE ENCOUNTER
Attempted to contact patient in order to confirm appointment with Raymundo Thibodeaux on 9/8/22. Also intended to inform the patient that the Universal Consent has been sent to their [employer] account, which must be signed prior to the virtual appointment. Left VM.

## 2022-09-08 ENCOUNTER — VIRTUAL VISIT (OUTPATIENT)
Dept: SURGERY | Age: 27
End: 2022-09-08
Payer: COMMERCIAL

## 2022-09-08 VITALS — BODY MASS INDEX: 47.27 KG/M2 | WEIGHT: 275.4 LBS

## 2022-09-08 DIAGNOSIS — E61.1 IRON DEFICIENCY: ICD-10-CM

## 2022-09-08 DIAGNOSIS — L50.1 IDIOPATHIC URTICARIA: Primary | ICD-10-CM

## 2022-09-08 DIAGNOSIS — E55.9 VITAMIN D DEFICIENCY: ICD-10-CM

## 2022-09-08 DIAGNOSIS — E53.8 B12 DEFICIENCY: ICD-10-CM

## 2022-09-08 DIAGNOSIS — E66.01 MORBID OBESITY WITH BMI OF 45.0-49.9, ADULT (HCC): ICD-10-CM

## 2022-09-08 DIAGNOSIS — K91.2 POSTOPERATIVE INTESTINAL MALABSORPTION: ICD-10-CM

## 2022-09-08 PROCEDURE — 99024 POSTOP FOLLOW-UP VISIT: CPT | Performed by: NURSE PRACTITIONER

## 2022-09-08 RX ORDER — VENLAFAXINE HYDROCHLORIDE 37.5 MG/1
TABLET, EXTENDED RELEASE ORAL
COMMUNITY
Start: 2022-08-24

## 2022-09-08 RX ORDER — FAMOTIDINE 20 MG/1
20 TABLET, FILM COATED ORAL 2 TIMES DAILY
COMMUNITY
Start: 2022-08-29

## 2022-09-08 RX ORDER — VENLAFAXINE HYDROCHLORIDE 75 MG/1
75 CAPSULE, EXTENDED RELEASE ORAL DAILY
COMMUNITY
Start: 2022-08-24

## 2022-09-08 NOTE — PATIENT INSTRUCTIONS
Your next appointment is 12/8/2022 at 9:40 AM.  This a virtual visit. If you do need to reschedule please call [phone]. I placed a lab order to recheck your labs / vitamins. You can go to any [address] [place], [place] [address]vd can access the order via [phone] (the patient portal) and I have also placed a paper copy in the mail. Please get your labs checked prior to your next appointment. For locations, visit [email or web address]     Goals for the next few months:    \"Increase intensity of workout and \" -participate in water aerobics at least once a week with friend and increase to twice a week over the next month    Aim for at least 20 g of protein per meal and try to eat solid food versus drinking a shake or using the chips    Please make an appoint with a dietitian by calling [phone]. You can be seen virtually or in person at no charge with [facility] or [facility].

## 2022-09-08 NOTE — PROGRESS NOTES
I was in the office while conducting this encounter. Consent:  He and/or his healthcare decision maker is aware that this patient-initiated Telehealth encounter is a billable service, with coverage as determined by his insurance carrier. He is aware that he may receive a bill and has provided verbal consent to proceed: NO    This virtual visit was conducted via [employer] me. Pursuant to the emergency declaration under the [address] [place] [address], [facility] waiver authority and the [employer] and [employer] Act, this Virtual  Visit was conducted to reduce the patient's risk of exposure to COVID-19 and provide continuity of care for an established patient. Services were provided through a video synchronous discussion virtually to substitute for in-person clinic visit. Due to this being a TeleHealth evaluation, many elements of the physical examination are unable to be assessed. Total Time: minutes: 21-30 minutes. Ivan Mckeon, was evaluated through a synchronous (real-time) audio-video encounter. The patient (or guardian if applicable) is aware that this is a billable service, which includes applicable co-pays. This Virtual Visit was conducted with patient's (and/or legal guardian's) consent. The visit was conducted pursuant to the emergency declaration under the [address] [place] [address], [address] [place] [address] waiver authority and the [employer] and [employer] Act. Patient identification was verified, and a caregiver was present when appropriate. The patient was located in a state where the provider was licensed to provide care. Chief Complaint   Patient presents with    Post OP Follow Up     3 month follow up,  S/P  Laparoscopic Levon-en-Y gastric bypass 6/10/22,  [phone] (Mobile)        Letty Jain 3-month status post laparoscopic gastric bypass for treatment of morbid obesity. She reports she is doing \"better\". She said no fever, chills, chest pain or shortness of breath  No abdominal pain  She says it no longer hurts to drink and she is getting in her fluids \"most of the time\". She has no nausea or vomiting  Occasionally she tries chicken again and that typically does not work. She said that that is been difficult because she misses eating chicken. She continues to rely on protein shakes and protein chips to get her protein needs met every day. She says she is trying to not rely so much on the protein chips. Bowels are moving most days  She has met with her psychiatric nurse practitioner and was started on Effexor however she has not begun taking it yet. She previously had not been taking her BuSpar or her bupropion or trazodone \"too many pills\". She said though she does struggle sometimes with depression and anxiety and knows she needs to be on something. \"I wanted to wait to see how my body reacts\". She has been walking most days for activity but would like to increase the intensity of her exercise and is interested in water aerobics. She has a friend that would also like to participate. Vitamins include once a week vitamin D, calcium twice a day, bariatric pal multivitamin with iron, hair skin and nails vitamin    Preop weight 329 pounds  Current  275 pounds    Visit Vitals  Wt 275 lb 6.4 oz (124.9 kg)   BMI 47.27 kg/m²     She appears well  Speech is clear breathing is unlabored  Mucous membranes appear moist  Abdomen is reportedly well-healed    ICD-10-CM ICD-9-CM    1. Idiopathic urticaria  W20.2 909.7 METABOLIC PANEL, COMPREHENSIVE      METABOLIC PANEL, COMPREHENSIVE      2. Morbid obesity with BMI of 45.0-49.9, adult ([facility]  E66.01 278.01     Z68.42 V85.42       3.  Iron deficiency  E61.1 280.9 CBC W/O DIFF      IRON PROFILE      VITAMIN B12 & FOLATE      METABOLIC PANEL, COMPREHENSIVE      CBC W/O DIFF      IRON PROFILE      VITAMIN B12 & FOLATE      METABOLIC PANEL, COMPREHENSIVE      4. B12 deficiency  E53.8 266.2 CBC W/O DIFF      IRON PROFILE      VITAMIN B12 & FOLATE      METABOLIC PANEL, COMPREHENSIVE      CBC W/O DIFF      IRON PROFILE      VITAMIN B12 & FOLATE      METABOLIC PANEL, COMPREHENSIVE      5. Vitamin D deficiency  E55.9 268.9 VITAMIN D, 25 HYDROXY      METABOLIC PANEL, COMPREHENSIVE      VITAMIN D, 25 HYDROXY      METABOLIC PANEL, COMPREHENSIVE      6. Postoperative intestinal malabsorption  K91.2 579.3 CBC W/O DIFF      IRON PROFILE      VITAMIN B12 & FOLATE      VITAMIN D, 25 HYDROXY      METABOLIC PANEL, COMPREHENSIVE      CBC W/O DIFF      IRON PROFILE      VITAMIN B12 & FOLATE      VITAMIN D, 25 HYDROXY      METABOLIC PANEL, COMPREHENSIVE        3-month status post laparoscopic gastric bypass for treatment of morbid obesity  46 pound weight loss to date  Referred to the dietitian for continued support  Nutrition and vitamin labs and will mail her a lab order  Reviewed vitamins and she will continue and will make adjustments as indicated depending on her lab results  Goal for the next few months is increased intensity of workout and she will do this by participating in water aerobics at least once per week with ultimate goal of twice per week  I encouraged her to participate in support group this evening which is on emotional eating and then I will send her information about some other small groups focusing on emotional eating  Reinforced high-protein, low-carb, low-fat, no added sugar diet and focusing on whole foods versus protein supplement  I did reassure her that her mental health medications are not contraindicated with her surgery and that she needed to be consistent about taking them in and that if she was not consistent she need to be forthcoming with her provider  Follow-up 12/8/2022 at 9:40 AM virtual visit  Davy Rivera verbalized understanding and questions were answered to the best of my knowledge and ability.     Diet, activity and support group educational materials were provided.

## 2022-09-08 NOTE — PROGRESS NOTES
1. Have you been to the ER, urgent care clinic since your last visit? Hospitalized since your last visit? No    2. Have you seen or consulted any other health care providers outside of the [address] [place] [address] since your last visit? Include any pap smears or colon screening.  No

## 2022-10-05 ENCOUNTER — TELEPHONE (OUTPATIENT)
Dept: SURGERY | Age: 27
End: 2022-10-05

## 2022-10-05 NOTE — LETTER
10/6/2022 11:09 AM    Ms. Dorsey Letters  [facility] [phone]         To Whom It May Concerns:    Patient Yenifer Duran had surgery 6/9/2022. She was not cleared at that time to return to gym activities. She had a 6 week recovery period from date of surgery.       Sincerely,      Autumn Yang NP

## 2022-10-05 NOTE — TELEPHONE ENCOUNTER
Patient identified with two patient identifiers. Patient requesting letter stating date of surgery and that she can return to gym activities when she is able. Patient made aware 4 months post op she is technically cleared to all activities. Per patient she did make the gym aware of her surgery back when she had it and they was suppose to freeze her account but did not. So in order get refund and cancel. She will need a physician's note. Patient informed I will discuss with provider and follow up.

## 2022-10-05 NOTE — TELEPHONE ENCOUNTER
Patient called stating she had surgery back in June and has not bee able to return to the gym due to physical capability. Patient attempted to cancel membership but the gym stated they will not allow it without a note from the office stating that she had the surgery.

## 2022-10-05 NOTE — Clinical Note
10/6/2022 11:08 AM    Ms. Jacobson Doing  [id number] [address] [address]              Sincerely,      Isabel Muñiz NP

## 2022-12-08 ENCOUNTER — TELEPHONE (OUTPATIENT)
Dept: SURGERY | Age: 27
End: 2022-12-08

## 2022-12-08 ENCOUNTER — VIRTUAL VISIT (OUTPATIENT)
Dept: SURGERY | Age: 27
End: 2022-12-08

## 2022-12-08 VITALS — WEIGHT: 249 LBS | HEIGHT: 64 IN | BODY MASS INDEX: 42.51 KG/M2

## 2022-12-08 NOTE — PROGRESS NOTES
Identified pt with two pt identifiers (name and ). Reviewed chart in preparation for visit and have obtained necessary documentation. Andreina Almazan is a 27 y.o. female  No chief complaint on file. There were no vitals taken for this visit. 1. Have you been to the ER, urgent care clinic since your last visit? Hospitalized since your last visit? No    2. Have you seen or consulted any other health care providers outside of the [address] [place] [address] since your last visit? Include any pap smears or colon screening.  No

## 2022-12-08 NOTE — TELEPHONE ENCOUNTER
Called pt to reschedule her appt from today 12/08 due to [facility] never connecting with her at 11am.     No answer, left a vm to return call.

## 2022-12-19 ENCOUNTER — VIRTUAL VISIT (OUTPATIENT)
Dept: SURGERY | Age: 27
End: 2022-12-19
Payer: COMMERCIAL

## 2022-12-19 ENCOUNTER — TELEPHONE (OUTPATIENT)
Dept: SURGERY | Age: 27
End: 2022-12-19

## 2022-12-19 ENCOUNTER — DOCUMENTATION ONLY (OUTPATIENT)
Dept: SURGERY | Age: 27
End: 2022-12-19

## 2022-12-19 VITALS — HEIGHT: 64 IN | BODY MASS INDEX: 41.48 KG/M2 | WEIGHT: 243 LBS

## 2022-12-19 DIAGNOSIS — R10.11 RIGHT UPPER QUADRANT ABDOMINAL PAIN: ICD-10-CM

## 2022-12-19 DIAGNOSIS — E66.01 MORBID OBESITY WITH BMI OF 40.0-44.9, ADULT (HCC): ICD-10-CM

## 2022-12-19 DIAGNOSIS — K91.2 POSTOPERATIVE INTESTINAL MALABSORPTION: Primary | ICD-10-CM

## 2022-12-19 DIAGNOSIS — R13.19 INTERMITTENT DYSPHAGIA: ICD-10-CM

## 2022-12-19 DIAGNOSIS — E86.0 DEHYDRATION: ICD-10-CM

## 2022-12-19 PROCEDURE — 99213 OFFICE O/P EST LOW 20 MIN: CPT | Performed by: NURSE PRACTITIONER

## 2022-12-19 NOTE — PROGRESS NOTES
I was in the office while conducting this encounter. Consent:  He and/or his healthcare decision maker is aware that this patient-initiated Telehealth encounter is a billable service, with coverage as determined by his insurance carrier. He is aware that he may receive a bill and has provided verbal consent to proceed: Yes    This virtual visit was conducted via Doxy. me. Pursuant to the emergency declaration under the [address] [place] [address], [facility] authority and the [employer] Act, this Virtual  Visit was conducted to reduce the patient's risk of exposure to COVID-19 and provide continuity of care for an established patient. Services were provided through a video synchronous discussion virtually to substitute for in-person clinic visit. Due to this being a TeleHealth evaluation, many elements of the physical examination are unable to be assessed. Total Time: minutes: 11-20 minutes. Refugio Syed, was evaluated through a synchronous (real-time) audio-video encounter. The patient (or guardian if applicable) is aware that this is a billable service, which includes applicable co-pays. This Virtual Visit was conducted with patient's (and/or legal guardian's) consent. The visit was conducted pursuant to the emergency declaration under the [address] [place] [address], [address] [place] [address] [facility] authority and the [employer] Act. Patient identification was verified, and a caregiver was present when appropriate. The patient was located in a state where the provider was licensed to provide care. Chief Complaint   Patient presents with    Morbid Obesity    Follow-up     EP VOV 6 month post status RNY -- LOV Weight(7/14/22) 293lb, Total Difference -50lb, -- [phone]        Radha Jain 6 months s/p Gastric bypass for treatment of morbid obesity.  Just returned from a cruise last night and is \"feeling rough\". Feels dehydrated and weak. \"I know I wasn't drinking enough\"   Last night had episode of LUQ abdominal pain that radiated to lower abdomen. Lasted about 75 minutes and felt better after vomiting and lying down   No associated fevers or chills   BM usually 1-2 per day   No melena  Reports she over indulged on the cruise for her birthday   Reports consistency with vitamins   Has not had labs since surgery   Trying to get protein at each meal and \"eating better\"     Pre op weight  329 lbs   Current   243 lbs         Co-Morbid(s)     Was anti coagulation initiated for presumed / confirmed DVT/PE? NO    Was an incisional hernia noted on exam?       NO      COMORBIDITY     SLEEP APNEA                 NO        GERD  (req.meds)           NO  HYPERLIPIDEMIA           NO  HYPERTENSION             NO       IF YES, # OF HTN MEDICATIONS 0  DIABETES                        NO      IF YES, 0 NON-INSULIN   0 INSULIN       Visit Vitals  Ht 5' 4\" (1.626 m)   Wt 243 lb (110.2 kg)   BMI 41.71 kg/m²     Appears well   Scratchy voice   Speech clear and breathing unlabored     ICD-10-CM ICD-9-CM    1. Postoperative intestinal malabsorption  K91.2 579.3       2. Morbid obesity with BMI of 40.0-44.9, adult ([facility]  E66.01 278.01     Z68.41 V85.41       3. Intermittent dysphagia  R13.19 787.29       4. Right upper quadrant abdominal pain  R10.11 789.01       5.  Dehydration  E86.0 276.51         6 months s/p Gastric bypass for treatment of morbid obesity   Will see about getting her into [facility] for fluids, PPI and goodie bag as she sounds dehydrated   Needs updated labs and order is in   Advised she can go when she comes for fluids   RD visit   Continue vitamins every day   Avoid NSAIDS, tobacco and ETOH   Follow up after fluids and reassess abdominal pain issue   May be related to gallbladder and if persists will get ultrasound   Regular follow up in 3 months  James Tabares verbalized understanding and questions were answered to the best of my knowledge and ability. Diet and activity  educational materials were provided.

## 2022-12-19 NOTE — Clinical Note
Trying to get her to [facility] this week. [facility] was waiting to hear from [facility] as [facility] only had Wednesday.   She will need to follow up with me again in next few weeks - thanks ARCHIE

## 2022-12-19 NOTE — PROGRESS NOTES
Identified pt with two pt identifiers (name and ). Reviewed chart in preparation for visit and have obtained necessary documentation. Willian Davies is a 24 y.o. female  Chief Complaint   Patient presents with    Morbid Obesity    Follow-up     EP VOV 6 month post status RNY -- LOV Weight(22) 293lb, Total Difference -50lb, -- [phone]      Visit Vitals  Ht 5' 4\" (1.626 m)   Wt 243 lb (110.2 kg)   BMI 41.71 kg/m²       1. Have you been to the ER, urgent care clinic since your last visit? Hospitalized since your last visit? No    2. Have you seen or consulted any other health care providers outside of the [address] [place] [address] since your last visit? Include any pap smears or colon screening.  No

## 2022-12-19 NOTE — PATIENT INSTRUCTIONS
If the abdominal pain recurs, you need to be seen. It may be related to your gallbladder. We are working on getting you set up for IV fluids, but in the meantime you need to be drinking all the time. Try having a Gatorade Zero, water, herbal tea     Stay on your vitamins every day       I placed a lab order to recheck your labs / vitamins. You can go to any [address] can access the order via [address] (the patient portal). Please get your labs checked prior to your next appointment. For locations, visit [email or web address]     make an appointment with one of the bariatric dietitians, please call the bariatric line at [phone]. Appointments are offered in person and virtual at no charge.

## 2022-12-20 ENCOUNTER — HOSPITAL ENCOUNTER (OUTPATIENT)
Dept: INFUSION THERAPY | Age: 27
Discharge: HOME | End: 2022-12-20
Payer: COMMERCIAL

## 2022-12-20 ENCOUNTER — TELEPHONE (OUTPATIENT)
Dept: SURGERY | Age: 27
End: 2022-12-20

## 2022-12-20 VITALS
TEMPERATURE: 97.6 F | HEART RATE: 58 BPM | WEIGHT: 244.7 LBS | OXYGEN SATURATION: 96 % | RESPIRATION RATE: 16 BRPM | DIASTOLIC BLOOD PRESSURE: 62 MMHG | BODY MASS INDEX: 42 KG/M2 | SYSTOLIC BLOOD PRESSURE: 130 MMHG

## 2022-12-20 PROCEDURE — 74011250636 HC RX REV CODE- 250/636: Performed by: NURSE PRACTITIONER

## 2022-12-20 PROCEDURE — 74011000250 HC RX REV CODE- 250: Performed by: NURSE PRACTITIONER

## 2022-12-20 PROCEDURE — 96361 HYDRATE IV INFUSION ADD-ON: CPT

## 2022-12-20 PROCEDURE — 96365 THER/PROPH/DIAG IV INF INIT: CPT

## 2022-12-20 RX ORDER — ONDANSETRON 2 MG/ML
4 INJECTION INTRAMUSCULAR; INTRAVENOUS
Status: ACTIVE | OUTPATIENT
Start: 2022-12-20 | End: 2022-12-20

## 2022-12-20 RX ORDER — ONDANSETRON 2 MG/ML
4 INJECTION INTRAMUSCULAR; INTRAVENOUS ONCE
Status: COMPLETED | OUTPATIENT
Start: 2022-12-20 | End: 2022-12-20

## 2022-12-20 RX ADMIN — SODIUM CHLORIDE, POTASSIUM CHLORIDE, SODIUM LACTATE AND CALCIUM CHLORIDE 1000 ML: 600; 310; 30; 20 INJECTION, SOLUTION INTRAVENOUS at 15:37

## 2022-12-20 RX ADMIN — ONDANSETRON 4 MG: 2 INJECTION INTRAMUSCULAR; INTRAVENOUS at 15:33

## 2022-12-20 RX ADMIN — SODIUM CHLORIDE, PRESERVATIVE FREE 20 MG: 5 INJECTION INTRAVENOUS at 15:33

## 2022-12-20 RX ADMIN — THIAMINE HYDROCHLORIDE: 100 INJECTION, SOLUTION INTRAMUSCULAR; INTRAVENOUS at 16:45

## 2022-12-20 NOTE — PROGRESS NOTES
[facility] Short Visit Progress Note    1500 Ms. Jain admitted to [facility] for hydration ambulatory in stable condition. Assessment completed. No new concerns voiced. Vital Signs:  Visit Vitals  /62   Pulse (!) 58   Temp 97.6 °F (36.4 °C)   Resp 16   Wt 111 kg (244 lb 11.2 oz)   SpO2 96%   Breastfeeding No   BMI 42.00 kg/m²         PIV with positive blood return. Medications:  Medications Administered       0.9% sodium chloride 1,000 mL, overfill volume 100 mL with mvi (adult no. 4 with vit K) 10 mL, thiamine 919 mg, folic acid 1 mg infusion       Admin Date  12/20/2022 Action  New Bag Dose   Rate  1,111 mL/hr Route  IntraVENous Administered By  Pino Snider RN              famotidine (PF) (PEPCID) 20 mg in 0.9% sodium chloride 10 mL injection       Admin Date  12/20/2022 Action  Given Dose  20 mg Route  IntraVENous Administered By  Pino Snider RN              lactated ringers bolus infusion 1,000 mL       Admin Date  12/20/2022 Action  New Bag Dose  1,000 mL Rate  1,000 mL/hr Route  IntraVENous Administered By  Pino Snider RN              ondansetron [facility]) injection 4 mg       Admin Date  12/20/2022 Action  Given Dose  4 mg Route  IntraVENous Administered By  Pino Snider RN                       AVS declined    Patient tolerated treatment well. Patient discharged from [facility] ambulatory in no distress at 1800. Patient aware of next appointment. Cecil Cassidy RN  December 20, 2022    No future appointments.

## 2022-12-20 NOTE — TELEPHONE ENCOUNTER
[facility] with the [facility] returning missed call from [facility]. Stated the Pharmacy is asking to switch protinox to Famotidine.

## 2023-02-16 NOTE — ROUTINE PROCESS
Bedside and Verbal shift change report given to Eliseo Yepez RN (oncoming nurse) by Lisa Pope RN (offgoing nurse). Report included the following information SBAR, Kardex, Intake/Output and MAR. OFFICE VISIT      Patient: Rosetta Greenberg   : 1984 MRN: [id number]    SUBJECTIVE:  Chief Complaint   Patient presents with   • Medical Problem Re-evaluation   • Convey Results       A 38 year old female is here for a multiple medical conditions.    HISTORY OF PRESENT ILLNESS:    Patient has given consent to record this visit for documentation in their clinical record.    Type 2 diabetes mellitus without complication, without long-term current use of insulin (CMS/HCC): On Jardiance 10 mg. Has increased urination as a side effect of medication Last HbA1c was in 2022 and was normal. Monitors blood glucose at home and the fasting blood glucose readings were around 130-140 mg/dL, and postprandial was around 190 mg/dL.  Last appointment with Ophthalmology was in 2022.     Essential hypertension: On Losartan 75 mg(50 mg+25 mg) and Hydrochlorothiazide 25 mg two tablets. Has normal blood pressure today. Does not monitor blood pressure at home.     Dyslipidemia associated with type 2 diabetes mellitus (CMS/HCC): On Lipitor 10 mg. Recent lab on lipid panel revealed normal LDL of 53 mg/dL on 2023.    Pulmonary embolism, bilateral (CMS/HCC): On Xarelto 20 mg. Denies sign of gum bleeding or bleeding issues. Has an appointment with Hematologist on 2023.     Additional comments:  Has h/o vitamin D deficiency in the pats, which has been improved. On vitamin D 62808 IU.    Denies new allergies.    S/p lump excision from the chin.     Her father has h/o Afib.    PAST MEDICAL HISTORY:  Past Medical History:   Diagnosis Date   • Diabetes mellitus type 2 without retinopathy (CMS/HCC) 2022   • Pulmonary embolism (CMS/HCC)        MEDICATIONS:  Current Outpatient Medications   Medication Sig   • hydroCHLOROthiazide (HYDRODIURIL) 25 MG tablet TAKE 2 TABLETS BY MOUTH DAILY   • losartan (COZAAR) 50 MG tablet TAKE 1 TABLET BY MOUTH DAILY   • losartan (COZAAR) 25 MG tablet Take 1 tablet by mouth daily.    • Xarelto 20 MG Tab TAKE 1 TABLET BY MOUTH DAILY WITH BREAKFAST   • cholecalciferol 1.25 mg (50,000 units) tablet Take 1 tablet by mouth 1 day a week.   • medroxyPROGESTERone (Provera) 10 MG tablet Take 1 tablet by mouth daily for 10 days.   • atorvastatin (LIPITOR) 10 MG tablet TAKE 1 TABLET BY MOUTH DAILY   • Jardiance 10 MG tablet TAKE 1 TABLET BY MOUTH DAILY BEFORE AND BREAKFAST   • PSYLLIUM FIBER PO Take by mouth daily.   • Multiple Vitamins-Minerals (VITAMIN - THERAPEUTIC MULTIVITAMINS W/MINERALS) Tab Take 1 tablet by mouth daily.     No current facility-administered medications for this visit.       ALLERGIES:  ALLERGIES:   Allergen Reactions   • Avocado   (Food Or Med) VOMITING   • Melons   (Food) Other (See Comments)     Stomach cramps        PAST SURGICAL HISTORY:  Past Surgical History:   Procedure Laterality Date   • Tonsillectomy         FAMILY HISTORY:  Family History   Problem Relation Age of Onset   • Cataracts Mother    • Patient is unaware of any medical problems Father    • Patient is unaware of any medical problems Sister    • Patient is unaware of any medical problems Brother    • Patient is unaware of any medical problems Daughter    • Patient is unaware of any medical problems Son    • Patient is unaware of any medical problems Maternal Aunt    • Patient is unaware of any medical problems Maternal Uncle    • Patient is unaware of any medical problems Paternal Aunt    • Patient is unaware of any medical problems Paternal Uncle    • Patient is unaware of any medical problems Maternal Grandmother    • Patient is unaware of any medical problems Paternal Grandmother    • Macular degeneration Maternal Grandfather    • Patient is unaware of any medical problems Paternal Grandfather    • Patient is unaware of any medical problems Other    • Amblyopia Neg Hx    • Blindness Neg Hx    • Cancer Neg Hx    • Diabetes Neg Hx    • Glaucoma Neg Hx    • Heart disease Neg Hx    • Hypertension Neg Hx    •  Kidney disease Neg Hx    • Retinal detachment Neg Hx    • Sjogren's Syndrome Neg Hx    • Strabismus Neg Hx    • Stroke Neg Hx    • Tuberculosis Neg Hx        SOCIAL HISTORY:  Social History     Tobacco Use   Smoking Status Never   Smokeless Tobacco Never     Social History     Substance and Sexual Activity   Alcohol Use Yes    Comment: rare       ROS    Respiratory: Per HPI.  Cardiovascular: Per HPI.  Metabolic/Endocrine: Per HPI.    OBJECTIVE:    Vitals:    02/15/23 0823   BP: 110/64   Pulse: 76   Resp: 17   Weight: (!) 151.5 kg (334 lb)   Height: 5' 11\"   LMP: 01/22/2023       Body mass index is 46.58 kg/m².    PHYSICAL EXAM    Constitutional: In no acute distress. Well developed  Eyes: The conjunctiva exhibited no abnormalities. The sclera was normal   Pulmonary: No respiratory distress, normal respiratory rate and effort and no accessory muscle use. Breath sounds clear to auscultation bilaterally.   Cardiovascular: Normal rate, no murmurs were heard, regular rhythm, normal S1 and normal S2. Edema was not present in the lower extremities.   Psychiatric: Oriented to time, place, and person. The mood was normal. The affect was normal. The memory was unimpaired.   Skin: Skin moisture and turgor normal.    Diabetic Foot Exam: Normal Bilateral Foot Exam: Skin integrity is normal. Dorsalis pedis and posterior tibial pulses are present.  Pressure sensation using the [place] monofilament is present.  DIAGNOSTIC STUDIES:  LAB RESULTS:  Lab Services on 02/15/2023   Component Date Value Ref Range Status   • WBC 02/15/2023 6.4  4.2 - 11.0 K/mcL Final   • RBC 02/15/2023 5.61 (A)  4.00 - 5.20 mil/mcL Final   • HGB 02/15/2023 16.1 (A)  12.0 - 15.5 g/dL Final   • HCT 02/15/2023 49.0 (A)  36.0 - 46.5 % Final   • MCV 02/15/2023 87.3  78.0 - 100.0 fl Final   • MCH 02/15/2023 28.7  26.0 - 34.0 pg Final   • MCHC 02/15/2023 32.9  32.0 - 36.5 g/dL Final   • RDW-CV 02/15/2023 14.1  11.0 - 15.0 % Final   • RDW-SD 02/15/2023 45.1   39.0 - 50.0 fL Final   • PLT 02/15/2023 308  140 - 450 K/mcL Final   • NRBC 02/15/2023 0  <=0 /100 WBC Final   • Neutrophil, Percent 02/15/2023 65  % Final   • Lymphocytes, Percent 02/15/2023 26  % Final   • Mono, Percent 02/15/2023 6  % Final   • Eosinophils, Percent 02/15/2023 2  % Final   • Basophils, Percent 02/15/2023 1  % Final   • Immature Granulocytes 02/15/2023 0  % Final   • Absolute Neutrophils 02/15/2023 4.2  1.8 - 7.7 K/mcL Final   • Absolute Lymphocytes 02/15/2023 1.7  1.0 - 4.8 K/mcL Final   • Absolute Monocytes 02/15/2023 0.4  0.3 - 0.9 K/mcL Final   • Absolute Eosinophils  02/15/2023 0.1  0.0 - 0.5 K/mcL Final   • Absolute Basophils 02/15/2023 0.1  0.0 - 0.3 K/mcL Final   • Absolute Immature Granulocytes 02/15/2023 0.0  0.0 - 0.2 K/mcL Final   Lab Services on 02/08/2023   Component Date Value Ref Range Status   • Fasting Status 02/08/2023 12  0 - 999 Hours Final   • Sodium 02/08/2023 140  135 - 145 mmol/L Final   • Potassium 02/08/2023 3.8  3.4 - 5.1 mmol/L Final   • Chloride 02/08/2023 100  97 - 110 mmol/L Final   • Carbon Dioxide 02/08/2023 29  21 - 32 mmol/L Final   • Anion Gap 02/08/2023 15  7 - 19 mmol/L Final   • Glucose 02/08/2023 129 (A)  70 - 99 mg/dL Final   • BUN 02/08/2023 10  6 - 20 mg/dL Final   • Creatinine 02/08/2023 0.84  0.51 - 0.95 mg/dL Final   • Glomerular Filtration Rate 02/08/2023 >90  >=60 Final   • BUN/ Creatinine Ratio 02/08/2023 12  7 - 25 Final   • Calcium 02/08/2023 9.3  8.4 - 10.2 mg/dL Final   • Bilirubin, Total 02/08/2023 0.6  0.2 - 1.0 mg/dL Final   • GOT/AST 02/08/2023 28  <=37 Units/L Final   • GPT/ALT 02/08/2023 34  <64 Units/L Final   • Alkaline Phosphatase 02/08/2023 63  45 - 117 Units/L Final   • Albumin 02/08/2023 3.8  3.6 - 5.1 g/dL Final   • Protein, Total 02/08/2023 7.3  6.4 - 8.2 g/dL Final   • Globulin 02/08/2023 3.5  2.0 - 4.0 g/dL Final   • A/G Ratio 02/08/2023 1.1  1.0 - 2.4 Final   • Fasting Status 02/08/2023 12  0 - 999 Hours Final   • Cholesterol  02/08/2023 136  <=199 mg/dL Final   • Triglycerides 02/08/2023 241 (A)  <=149 mg/dL Final   • HDL 02/08/2023 35 (A)  >=50 mg/dL Final   • LDL 02/08/2023 53  <=129 mg/dL Final   • Non-HDL Cholesterol 02/08/2023 101  mg/dL Final   • Cholesterol/ HDL Ratio 02/08/2023 3.9  <=4.4 Final   • Vitamin D, 25-Hydroxy 02/08/2023 49.0  30.0 - 100.0 ng/mL Final       ASSESSMENT AND PLAN:  This is a 38 year old female who presents with :    1. Type 2 diabetes mellitus without complication, without long-term current use of insulin (CMS/[facility])    2. Essential hypertension    3. Dyslipidemia associated with type 2 diabetes mellitus (CMS/[facility])    4. Pulmonary embolism, bilateral (CMS/[facility])        Orders Placed This Encounter   • Comprehensive Metabolic Panel   • Lipid Panel With Reflex   • Glycohemoglobin   • Microalbumin Urine Random       Plan:    Type 2 diabetes mellitus without complication, without long-term current use of insulin (CMS/[facility]):   Well controlled.  Reviewed and discussed past labs.   Ordered labs, refer to orders.  Continue Jardiance 10 mg. Discussed benefits.   Discussed goal of fasting blood glucose as around 120-150 mg/dL and postprandial readings of 180-200 mg/dL.   Discussed goal of HbA1c as below 7%.     Essential hypertension:   Well controlled.   Ordered labs, refer to orders.  Continue Losartan 75 mg and Hydrochlorothiazide 25 mg two tablets.  Recommended monitoring blood pressure two -three times at home or at Pharmacy.     Dyslipidemia associated with type 2 diabetes mellitus (CMS/[facility]):   Reviewed and discussed past labs.   Ordered labs, refer to orders.  Continue Lipitor 10 mg.    Pulmonary embolism, bilateral (CMS/[facility]):   Continue Xarelto 20 mg.     Additional comments:  Vitamin D deficiency:  Well controlled.  Recommended taking vitamin D 2000/5000 units a day.  Avoid taking vitamin D 45216 IU.    Follow up in six months or sooner if needed.     Refer to orders.  Medical compliance with plan discussed and  risks of non-compliance reviewed.  Patient education completed on disease process, etiology & prognosis.  Proper usage and side effects of medications reviewed & discussed.  Patient understands and agrees with the plan.  Return to clinic as clinically indicated as discussed with patient who verbalized understanding of the plan and is in agreement with the plan.    Return for 6 month follow up w/ labs prior.    I,  Dr. Den Whitmore, have created a visit summary document based on the audio recording between Dr. Mary Interiano MD and this patient for the physician to review, edit as needed, and authenticate.  Creation Date: 2/15/2023     I have reviewed and edited the visit summary above and attest that it is accurate.

## 2023-03-09 ENCOUNTER — OFFICE VISIT (OUTPATIENT)
Dept: SURGERY | Age: 28
End: 2023-03-09

## 2023-03-09 VITALS
HEART RATE: 75 BPM | SYSTOLIC BLOOD PRESSURE: 110 MMHG | TEMPERATURE: 98.1 F | WEIGHT: 232 LBS | BODY MASS INDEX: 39.61 KG/M2 | DIASTOLIC BLOOD PRESSURE: 61 MMHG | OXYGEN SATURATION: 99 % | HEIGHT: 64 IN

## 2023-03-09 DIAGNOSIS — F50.89 PICA: ICD-10-CM

## 2023-03-09 DIAGNOSIS — R52 BODY ACHES: ICD-10-CM

## 2023-03-09 DIAGNOSIS — K91.2 POSTOPERATIVE INTESTINAL MALABSORPTION: Primary | ICD-10-CM

## 2023-03-09 DIAGNOSIS — E66.9 OBESITY, CLASS II, BMI 35-39.9: ICD-10-CM

## 2023-03-09 RX ORDER — MULTIVIT-MIN/IRON/FOLIC ACID/K 45-800-120
1 CAPSULE ORAL DAILY
COMMUNITY

## 2023-03-09 NOTE — PATIENT INSTRUCTIONS
Make an appointment with one of the bariatric dietitians, please call the bariatric line at [phone]. Appointments are offered in person and virtual at no charge.     I will follow up with you when I get your labs back     Vitamins-     Try a Lena Complete with iron (2) chews per day     D weekly     Will wait on labs for B12     Calcium chews 500 mg goal is 3 per day     Tespo is the liquid vitamin dispenser and you would need the women's bariatric blend

## 2023-03-09 NOTE — PROGRESS NOTES
Identified pt with two pt identifiers (name and ). Reviewed chart in preparation for visit and have obtained necessary documentation. Jennifer Officer is a 24 y.o. female  Chief Complaint   Patient presents with    Surgical Follow-up     9 months s/p lap Levon-en-Y gastric bypass:     Visit Vitals  /61 (BP 1 Location: Right upper arm, BP Patient Position: Sitting, BP Cuff Size: Large adult)   Pulse 75   Temp 98.1 °F (36.7 °C) (Oral)   Ht 5' 4\" (1.626 m)   Wt 232 lb (105.2 kg)   SpO2 99%   BMI 39.82 kg/m²     Current wt 232 lbs previous  Wt loss of -12 lbs  Total wt loss of 62 lbs since surgery      1. Have you been to the ER, urgent care clinic since your last visit? Hospitalized since your last visit? No    2. Have you seen or consulted any other health care providers outside of the [address] [place] [address] since your last visit? Include any pap smears or colon screening. No    Pt reports 10 days ago started with body aches, sharp neck chest and back pain that comes and goes. She also reports some nausea at times when taking medications.

## 2023-03-10 DIAGNOSIS — E55.9 VITAMIN D DEFICIENCY: ICD-10-CM

## 2023-03-10 RX ORDER — ERGOCALCIFEROL 1.25 MG/1
50000 CAPSULE ORAL
Qty: 5 CAPSULE | Refills: 3 | Status: SHIPPED | OUTPATIENT
Start: 2023-03-10

## 2023-03-10 NOTE — PROGRESS NOTES
Chief Complaint   Patient presents with    Surgical Follow-up     9 months s/p lap Levon-en-Y gastric bypass:       Lestine Scheuermann is 9 months status post gastric bypass. Presents today for obesity management. C/o feeling tired, body aches, upper back and anterior chest wall pain. Still with intermittent nausea, food aversion and vomits 3-4x/ week. Skips meals and \"just don't eat\"   No vitamins - she has ag pal MVI and calcium but says they \"make her vomit\"   + ice PICA   Body aches, pain in c-spine and upper chest most likely due to weight of pendulous breasts 40-DD   Headaches   Hair loss       Patient is consuming < 30 grams of protein daily. Patient is drinking 32+ oz of fluids per day. Denies NSAIDS, tobacco and ETOH   (-) EGD post op     Says weight loss has stalled     Bowels moving slowly and c/o constipation   Busy with work in mental health   Son is good     Pre op weight 329 lbs   Current  232 lbs     Physical Exam  Visit Vitals  /61 (BP 1 Location: Right upper arm, BP Patient Position: Sitting, BP Cuff Size: Large adult)   Pulse 75   Temp 98.1 °F (36.7 °C) (Oral)   Ht 5' 4\" (1.626 m)   Wt 232 lb (105.2 kg)   SpO2 99%   BMI 39.82 kg/m²     A + O x 3  Chest  CTA, unlabored   COR  RRR  ABD Soft, obese, NT/ND, no masses or hernias   EXT No edema; ambulating independently       ICD-10-CM ICD-9-CM    1. Postoperative intestinal malabsorption  K91.2 579.3       2. Obesity, Class II, BMI 35-39.9  E66.9 278.00       3. Pica  F50.89 307.52       4.  Body aches  R52 780.96           Lestine Scheuermann is 9 months s/p gastric bypass  54% excess weight loss  She had labs yesterday and they are pending   Suspect much of her issues related to poor diet and vitamin deficiencies   Stressed she has the power to feel better and must make self a priority   Suggested she try a Hi Hat complete to see if tolerable and could do Flintstones, B12 shots, weekly D and chewy calcium   She is NOT good with pills   Could consider Tespo vitamins (liquid, like a Kureg for vitamins), but may be cost prohibitive  If indicated will send for Iron infusions  Referred to RD for support   She has previously had a (-) EGD   Reminded no NSAIDS, tobacco or ETOH   Follow up in 6 weeks   Activity daily walking   Sleep hygiene reviewed   Support group  James Tabares verbalized understanding and questions were answered to the best of my knowledge and ability. Diet, activity and mindfulness educational materials were provided. 38 minutes spent in face to face with James Tabares > 50% counseling.

## 2023-03-16 ENCOUNTER — TELEPHONE (OUTPATIENT)
Dept: SURGERY | Age: 28
End: 2023-03-16

## 2023-03-16 DIAGNOSIS — K91.2 POSTOPERATIVE INTESTINAL MALABSORPTION: Primary | ICD-10-CM

## 2023-03-16 RX ORDER — SYRINGE,SAFETY WITH NEEDLE,3ML 23GX1"
SYRINGE, EMPTY DISPOSABLE MISCELLANEOUS
Qty: 25 PEN NEEDLE | Refills: 3 | Status: SHIPPED | OUTPATIENT
Start: 2023-03-16

## 2023-03-16 RX ORDER — CYANOCOBALAMIN 1000 UG/ML
1000 INJECTION, SOLUTION INTRAMUSCULAR; SUBCUTANEOUS
Qty: 3 ML | Refills: 5 | Status: SHIPPED | OUTPATIENT
Start: 2023-03-16

## 2023-03-16 NOTE — TELEPHONE ENCOUNTER
Returned patient's call. She stated that \"Pau mentioned B-12 shots and an iron injection. \"      She would like to know about her lab results. I advised the patient that I would let Anca Butler know that she called. I advised the patient that Anca Butler could send her a "[employer]" message with an answer if she would like, or I could call her back with the information, or both. I advised the patient that if she was getting an iron infusion, someone will probably be calling her to schedule that. This information will probably be in the message from Anca Butler. The patient verbalized understanding of all and stated that a "[employer]" message would be fine.

## 2023-03-16 NOTE — TELEPHONE ENCOUNTER
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

## 2023-03-16 NOTE — TELEPHONE ENCOUNTER
Patient called stating that she has seen her lab results load into her MyChart and was wanting a call back to go over the results and also see if going forward with infusion was still going to happen since she is unable to take the vitamins.

## 2023-03-22 ENCOUNTER — TELEPHONE (OUTPATIENT)
Dept: SURGERY | Age: 28
End: 2023-03-22

## 2023-03-22 NOTE — TELEPHONE ENCOUNTER
Called patient in attempt to schedule a nurse visit so that she can be shown how to use her B12 shots. Patient will need to bring the B12 shots with her to the visit. No answer, left voicemail.

## 2023-03-23 ENCOUNTER — CLINICAL SUPPORT (OUTPATIENT)
Dept: SURGERY | Age: 28
End: 2023-03-23

## 2023-03-23 VITALS
TEMPERATURE: 98.9 F | HEART RATE: 81 BPM | SYSTOLIC BLOOD PRESSURE: 127 MMHG | BODY MASS INDEX: 38.93 KG/M2 | HEIGHT: 64 IN | WEIGHT: 228 LBS | DIASTOLIC BLOOD PRESSURE: 73 MMHG | RESPIRATION RATE: 18 BRPM | OXYGEN SATURATION: 98 %

## 2023-03-23 DIAGNOSIS — E53.8 VITAMIN B 12 DEFICIENCY: Primary | ICD-10-CM

## 2023-03-23 RX ORDER — CYANOCOBALAMIN 1000 UG/ML
1000 INJECTION, SOLUTION INTRAMUSCULAR; SUBCUTANEOUS
Status: COMPLETED | OUTPATIENT
Start: 2023-03-23 | End: 2023-03-23

## 2023-03-23 RX ORDER — CYANOCOBALAMIN 1000 UG/ML
1000 INJECTION, SOLUTION INTRAMUSCULAR; SUBCUTANEOUS
Status: SHIPPED | OUTPATIENT
Start: 2023-03-23 | End: 2023-04-22

## 2023-03-23 RX ADMIN — CYANOCOBALAMIN 1000 MCG: 1000 INJECTION, SOLUTION INTRAMUSCULAR; SUBCUTANEOUS at 13:01

## 2023-03-23 NOTE — PROGRESS NOTES
Identified pt with two pt identifiers (name and ). Reviewed chart in preparation for visit and have obtained necessary documentation. Lm Valentine is a 24 y.o. female  Chief Complaint   Patient presents with    Injection B12     Visit Vitals  /73 (BP 1 Location: Right arm, BP Patient Position: Sitting, BP Cuff Size: Large adult)   Pulse 81   Temp 98.9 °F (37.2 °C) (Oral)   Resp 18   Ht 5' 4\" (1.626 m)   Wt 228 lb (103.4 kg)   SpO2 98%   BMI 39.14 kg/m²       1. Have you been to the ER, urgent care clinic since your last visit? Hospitalized since your last visit? No    2. Have you seen or consulted any other health care providers outside of the [address] [place] [address] since your last visit? Include any pap smears or colon screening. No      Patient present for vitamin b12 injection as ordered by HECTOR Grant NP.

## 2023-03-27 ENCOUNTER — CLINICAL SUPPORT (OUTPATIENT)
Dept: SURGERY | Age: 28
End: 2023-03-27

## 2023-03-27 VITALS — BODY MASS INDEX: 38.62 KG/M2 | WEIGHT: 225 LBS

## 2023-03-27 DIAGNOSIS — E66.9 OBESITY, CLASS II, BMI 35-39.9: Primary | ICD-10-CM

## 2023-03-27 NOTE — PROGRESS NOTES
Post-Operative Bariatric Nutrition Counseling - Office Visit    [facility]   Name: Rusty Evangelista  : 1995        Reason for visit: Follow up nutrition education and counseling post gastric bypass      ASSESSMENT:  Date of surgery:2022    Medications/Supplements:   Prior to Admission medications    Medication Sig Start Date End Date Taking? Authorizing Provider   cyanocobalamin (VITAMIN B12) 1,000 mcg/mL injection 1 mL by IntraMUSCular route every twenty-eight (28) days. Indications: prevention of vitamin B12 deficiency 3/16/23   Kelly Ac NP   Syringe with Needle, Safety (3cc Safety Syringe 23Gx1\") 3 mL 23 gauge x 1\" syrg For B12 injection 3/16/23   Kelly Ac NP   ergocalciferol (ERGOCALCIFEROL) 1,250 mcg (50,000 unit) capsule TAKE 1 CAPSULE BY MOUTH EVERY SEVEN (7) DAYS. INDICATIONS: LOW VITAMIN D LEVELS 3/10/23   Kelly Ac NP   multivitamin-min-iron-FA-vit K (Bariatric Multivitamins) 45 mg iron- 800 mcg-120 mcg cap Take 1 Tablet by mouth daily. Bariatric pal    Provider, Historical   CALCIUM CITRATE PO Take 2 Tablets by mouth daily. Provider, Historical   MULTIVITAMIN PO Take  by mouth. Patient not taking: Reported on 3/23/2023    Provider, Historical       Pt takes recommended supplements as prescribed:   Multivitamin: yes- 1 Flinstones per day   Calcium Citrate:yes - 1000 mg per day (2 Calcium Chewy bites)    Vitamin D3: yes - prescription   Vitamin B12: yes - monthly injections      Food Allergies/Intolerances: no food allergies    Anthropometrics:     Ht: 64\"  Today's Office Wt :225#     Pre-op wt: 329#   Net Wt Loss: 104# (31%)   IBW: 120#  %IBW: 187%   BMI: 38 Category: obesity II     Reported wt history: Pt presents today seeking post-op nutrition counseling 9 months s/p gastric bypass. Pt has had difficulties with food aversions, daily vomiting, skipping meals and intolerance to vitamins.  Reports hair thinning and recent stall in wt loss. Had recent visit with Hal Torre and has since switched vitamin regimen and products with improved tolerance. Now wants to focus on improving eating habits. Meal patterns are inconsistent d/t work schedule. Often goes all day without eating and then eating quickly or eating too many extra bites once she gets a chance to eat which often results in pain/discomfort or vomiting. Pt is getting  in August [address]. Exercise/Physical Actvity: walking for 30-60 minutes, 2-3 times per week    Reported Diet History: A post op nutrition checklist and 24 hour diet recall were obtained from patient; Consumes 3 meals per day no   Includes a lean source of protein with each meal yes   Measures all meals to 1/2-1 cup no   Limits dining out and orders with special request no   Consumes meals over 20-30 minutes no   Monitors hunger and fullness cues no   Follows the 30-30-30 rule yes   Sips 48-64 oz of sugar free, calorie free, non-carbonated beverages per day no   Utilizes food journal for self-monitoring no   Attends Bariatric Support Group no       24 Hour Diet Recall  Breakfast  Skips or takes a few sips of a shake but doesn't finish it    Lunch  Chicken and veggies   Dinner  Chicken and veggies or salad   Snacks  Protein chips   Beverages  Sweet tea (sometimes with real sugar or sugar substitute), water          Environmental/Social/Psychological: Pt works full time as a  often traveling to various locations/homes throughout the day. Pt and eduard have a 3year old son. NUTRITION DIAGNOSIS:  Inadequate protein intake r/t multiple etiologies evidenced by diet recall that reveals average 30-40 grams protein per day. Self-monitoring deficit r/t multiple etiologies evidenced by pt skips meals. NUTRITION INTERVENTION:  Nutrition education and counseling to promote improved protein intake and compliance with general nutrition guidelines.        NUTRITION MONITORING AND EVALUATION:    The following goals were established with patient:  Implement the Rule of 4s - eating 4 times per day, 4 oz per meal, eating every 4 hours. Strongly encouraged to measure her meals to 1/2 - 3/4 cup per meal to help prevent overeating. Eat more consistently during the day to help ensure adequate protein intake and to provide better hunger and blood sugar control which can help prevent eating too quickly often resulting in discomfort/pain/vomiting. Slow down eating pace to prevent overeating or food intolerance from not chewing well enough. Re-try protein shakes as a breakfast option instead of skipping breakfast. Recommended [employer]. Pack meals and high protein snack options to take when out in the field for work. Set timers as reminders to stop and eat. Maintain exercise as tolerated. Increase multivitamin to 2 per day (Flinstones Chewable) and increase calcium chewy bites to 3 per day for 1500 mg per day. Space vitamins out by at least 2 hours. Follow up with RD in one month. Specific tips and techniques to facilitate compliance with above recommendations were provided and discussed. Pt was strongly encouraged to begin making necessary changes now, attend support group, and re-visit the dietitian prn. If further details are desired please feel free to contact me at [phone]. This phone number was also provided to the patient for any further questions or concerns.              Corbin Hart RD

## 2023-04-20 ENCOUNTER — OFFICE VISIT (OUTPATIENT)
Dept: SURGERY | Age: 28
End: 2023-04-20

## 2023-04-20 VITALS
RESPIRATION RATE: 18 BRPM | DIASTOLIC BLOOD PRESSURE: 75 MMHG | BODY MASS INDEX: 38.79 KG/M2 | HEIGHT: 64 IN | SYSTOLIC BLOOD PRESSURE: 123 MMHG | HEART RATE: 74 BPM | TEMPERATURE: 97.9 F | OXYGEN SATURATION: 96 % | WEIGHT: 227.2 LBS

## 2023-04-20 DIAGNOSIS — E53.8 B12 DEFICIENCY: Primary | ICD-10-CM

## 2023-04-20 NOTE — PROGRESS NOTES
Identified pt with two pt identifiers (name and ). Reviewed chart in preparation for visit and have obtained necessary documentation. Shedrick Kanner is a 24 y.o. female  Chief Complaint   Patient presents with    Follow-up     Review B 12     Injection B12     Visit Vitals  /75 (BP 1 Location: Left arm, BP Patient Position: Sitting, BP Cuff Size: Adult)   Pulse 74   Temp 97.9 °F (36.6 °C) (Oral)   Resp 18   Ht 5' 4\" (1.626 m)   Wt 227 lb 3.2 oz (103.1 kg)   SpO2 96%   BMI 39.00 kg/m²       1. Have you been to the ER, urgent care clinic since your last visit? Hospitalized since your last visit? No    2. Have you seen or consulted any other health care providers outside of the [address] [place] [address] since your last visit? Include any pap smears or colon screening.  No    B 12 Injection verified order patient tolerated well    Lot # [id number]  Manufacture:[employer]   Exp:  Site:[facility]

## 2023-04-24 ENCOUNTER — CLINICAL SUPPORT (OUTPATIENT)
Dept: SURGERY | Age: 28
End: 2023-04-24

## 2023-04-24 DIAGNOSIS — E66.01 MORBID OBESITY WITH BMI OF 40.0-44.9, ADULT (HCC): Primary | ICD-10-CM

## 2023-04-24 NOTE — PROGRESS NOTES
Post-Operative Bariatric Nutrition Counseling - Follow Up Phone Consult      Physician/Surgeon:Pau [facility]LLORY[facility]ΓΕΙ[facility], N.P.   Name: Conner Gaspar                       : 1995                                  Reason for visit: Follow up nutrition education and counseling post gastric bypass        ASSESSMENT:  Date of surgery:2022     Medications/Supplements:           Prior to Admission medications    Medication Sig Start Date End Date Taking? Authorizing Provider   cyanocobalamin (VITAMIN B12) 1,000 mcg/mL injection 1 mL by IntraMUSCular route every twenty-eight (28) days. Indications: prevention of vitamin B12 deficiency 3/16/23     Fadi Thomas, NP   Syringe with Needle, Safety (3cc Safety Syringe 23Gx1\") 3 mL 23 gauge x 1\" syrg For B12 injection 3/16/23     Fadi Thomas NP   ergocalciferol (ERGOCALCIFEROL) 1,250 mcg (50,000 unit) capsule TAKE 1 CAPSULE BY MOUTH EVERY SEVEN (7) DAYS. INDICATIONS: LOW VITAMIN D LEVELS 3/10/23     Fadi Thomas NP   multivitamin-min-iron-FA-vit K (Bariatric Multivitamins) 45 mg iron- 800 mcg-120 mcg cap Take 1 Tablet by mouth daily. Bariatric pal       Provider, Historical   CALCIUM CITRATE PO Take 2 Tablets by mouth daily. Provider, Historical   MULTIVITAMIN PO Take  by mouth.   Patient not taking: Reported on 3/23/2023       Provider, Historical         Pt takes recommended supplements as prescribed:              Multivitamin: yes- 1 Flinstones per day              Calcium Citrate:yes - 1000 mg per day (2 Calcium Chewy bites)              Vitamin D3: yes - prescription              Vitamin B12: yes - monthly injections        Food Allergies/Intolerances: no food allergies     Anthropometrics:          Ht: 64\"  Today's Reported Wt: 227#   Today's Office Wt: 225#        Net Change: 2# wt gain        Pre-op wt: 329#   Net Wt Loss: 106# (31%)     IBW: 120#       %IBW: 187%     BMI: 38            Category: obesity II      Reported wt history: Pt completed post-op nutrition counseling last month and is 10 months s/p gastric bypass. Biggest challenges have been skipping meals and difficulty achieving adequate protein and fluid intake. Meal patterns are inconsistent d/t work schedule. Often goes all day without eating and then eating quickly or eating too many extra bites once she gets a chance to eat which often results in pain/discomfort or vomiting. Pt is getting  in August [address]. Since last month pt reports she has been working with her therapist on managing stress specific to certain triggers (ex. unpredictable work schedule). Past few weeks has been packing \"on the go\" snacks (peanuts, protein bars) to use in place of skipping meals. Has increased fluid intake to consistent 50 ounces per day. Is still drinking with meals d/t lack of paying attention. Exercise/Physical Actvity: walking (3 times per week) and plans to replace walking with going to the gym (cardio, strength, Ruben classes)      Reported Diet History: A post op nutrition checklist and 24 hour diet recall were obtained from patient;                  Consumes 3 meals per day no   Includes a lean source of protein with each meal yes   Measures all meals to 1/2-1 cup no   Limits dining out and orders with special request no   Consumes meals over 20-30 minutes no   Monitors hunger and fullness cues no   Follows the 30-30-30 rule yes   Sips 48-64 oz of sugar free, calorie free, non-carbonated beverages per day no   Utilizes food journal for self-monitoring no   Attends Bariatric Support Group no         24 Hour Diet Recall  Breakfast   Skips or takes a few sips of a shake but doesn't finish it    Lunch   Chicken and veggies   Dinner   Chicken and veggies or salad   Snacks   Protein chips   Beverages   Sweet tea (sometimes with real sugar or sugar substitute), water                       NUTRITION DIAGNOSIS:  Self-monitoring deficit r/t multiple etiologies evidenced by lacks consistent self-monitoring. NUTRITION INTERVENTION:  Nutrition education and counseling to promote improved compliance with general nutrition guidelines. NUTRITION MONITORING AND EVALUATION:     The following goals were established with patient:  Continue to strive for the eating 4 times per day, 4 oz per meal, eating every 4 hours. Set timers for meals and to stop/start drinking. Continue to strive for increased fluid intake with ultimate goal of 64 oz ounces per day to prevent dehydration. Avoid drinking with meals (30/30 rule). Set timers PRN. Maintain exercise as tolerated. Aim for 150 minutes per week at a minimum. Consider replacing Flinstone [place] with bariatric multivitamin (1 per day). Continue with calcium citrate (3 chewy bites per day). Weight check once per week and using the same scale each week. Support Group, Back to Basics. Follow up with RD PRN. Specific tips and techniques to facilitate compliance with above recommendations were provided and discussed. Pt was strongly encouraged to begin making necessary changes now, attend support group, and re-visit the dietitian prn. If further details are desired please feel free to contact me at [phone]. This phone number was also provided to the patient for any further questions or concerns.                                                                                           Simeon Zimmerman RD

## 2023-05-26 ENCOUNTER — OFFICE VISIT (OUTPATIENT)
Age: 28
End: 2023-05-26
Payer: COMMERCIAL

## 2023-05-26 VITALS
OXYGEN SATURATION: 97 % | RESPIRATION RATE: 16 BRPM | HEART RATE: 60 BPM | HEIGHT: 64 IN | SYSTOLIC BLOOD PRESSURE: 112 MMHG | DIASTOLIC BLOOD PRESSURE: 61 MMHG | TEMPERATURE: 98 F | WEIGHT: 218 LBS | BODY MASS INDEX: 37.22 KG/M2

## 2023-05-26 DIAGNOSIS — L98.7 EXCESS SKIN OF ARM: ICD-10-CM

## 2023-05-26 DIAGNOSIS — L98.7 EXCESS SKIN OF ABDOMEN: ICD-10-CM

## 2023-05-26 DIAGNOSIS — K91.2 POSTOPERATIVE INTESTINAL MALABSORPTION: Primary | ICD-10-CM

## 2023-05-26 DIAGNOSIS — E53.8 VITAMIN B12 DEFICIENCY: ICD-10-CM

## 2023-05-26 DIAGNOSIS — L30.4 INTERTRIGO: ICD-10-CM

## 2023-05-26 DIAGNOSIS — E66.9 OBESITY (BMI 30-39.9): ICD-10-CM

## 2023-05-26 PROCEDURE — 99213 OFFICE O/P EST LOW 20 MIN: CPT | Performed by: NURSE PRACTITIONER

## 2023-05-26 PROCEDURE — 96372 THER/PROPH/DIAG INJ SC/IM: CPT | Performed by: NURSE PRACTITIONER

## 2023-05-26 RX ORDER — IBUPROFEN 200 MG
1 CAPSULE ORAL DAILY
COMMUNITY

## 2023-05-26 RX ORDER — CYANOCOBALAMIN 1000 UG/ML
1000 INJECTION, SOLUTION INTRAMUSCULAR; SUBCUTANEOUS ONCE
Status: COMPLETED | OUTPATIENT
Start: 2023-05-26 | End: 2023-05-26

## 2023-05-26 RX ADMIN — CYANOCOBALAMIN 1000 MCG: 1000 INJECTION, SOLUTION INTRAMUSCULAR; SUBCUTANEOUS at 14:43

## 2023-05-26 ASSESSMENT — PATIENT HEALTH QUESTIONNAIRE - PHQ9
SUM OF ALL RESPONSES TO PHQ QUESTIONS 1-9: 2
SUM OF ALL RESPONSES TO PHQ QUESTIONS 1-9: 2
2. FEELING DOWN, DEPRESSED OR HOPELESS: 1
1. LITTLE INTEREST OR PLEASURE IN DOING THINGS: 1
SUM OF ALL RESPONSES TO PHQ9 QUESTIONS 1 & 2: 2
SUM OF ALL RESPONSES TO PHQ QUESTIONS 1-9: 2
SUM OF ALL RESPONSES TO PHQ QUESTIONS 1-9: 2

## 2023-05-29 NOTE — PROGRESS NOTES
Chief Complaint   Patient presents with    Follow-up     11 months post lap gastric bypass down 14 lbs, total lost 111 lbs     Injections     Vitamin b12       Opal Trammell is 11 months status post Gastric bypass for treatment of morbid obesity . Presents today for obesity management. Patient has lost 111 lbs. Since surgery. Patient is satisfied with progress. Doing better with taking vitamins and consistency. Met with RD and is \"trying\"     Has identified she is struggling emotionally and reverting back to some old behaviors, so she has reached out to her psychiatrist and made an appt. Pain: low back pain related to menses     Protein intake: 30+/- grams of protein daily. Fluid intake: 32 oz of fluids per day. bowels moving most days     Nausea  prn  Regurgitation  Prn \"if takes > 1 pill at a time\"      Vitamin compliance _YES__ Bariatric MVI 45 mg iron  _3 chews__ Calcium Citrate 1200 -1500 mg daily in divided doses        Activity  Gym few times per week   Sleep   Not great     Skin Excess upper arms, back and abdomen   Using \"sweet sweat\" roll on     Pre op weight 329 lbs   [place]  218 lbs   Current  218 lbs     Her goal < 200 lbs         Co-Morbid(s)     Was anti coagulation initiated for presumed / confirmed DVT/PE? No    Was an incisional hernia noted on exam?       No      COMORBIDITY     SLEEP APNEA                 No        GERD  (req.meds)           No  HYPERLIPIDEMIA           No  HYPERTENSION             No       IF YES, # OF HTN MEDICATIONS 0  DIABETES                        No      IF YES, 0 NON-INSULIN   0 INSULIN       Physical Exam  /61 (Site: Left Upper Arm, Position: Sitting, Cuff Size: Large Adult)   Pulse 60   Temp 98 °F (36.7 °C) (Oral)   Resp 16   Ht 5' 4\" (1.626 m)   Wt 218 lb (98.9 kg)   SpO2 97%   BMI 37.42 kg/m²   A + O x 3, appears well   Chest  CTA  COR  RRR  ABD Soft, obese with excess skin, NT/ND, +BS, no masses or hernias.   EXT No edema;

## 2023-05-29 NOTE — PATIENT INSTRUCTIONS
Maintain your follow up with the bariatric dietitians. For appointments,  please call the bariatric line at [phone]. Appointments are offered in person and virtual at no charge. Add yoga to your exercise regimen, it can help with mindfulness and stress reduction. Check out Yoga [employer] [place] on YouTube       Keys to long term weight loss / management     -  Regular weight check and self monitoring. Weigh yourself at least weekly and if the scale is creeping up, reach out!    -  Sleep is critical and aim for 7 hours / night     -  Drink mostly water and lots of it     -  Eat whole foods and focus on lean proteins and fiber (like veggies and fruits). Limit/avoid processed foods (ie eating out of a box or bag)     -  Move every day = walk, swim, bike, yard work, etc     -  Journal what you eat (this is a proven tool to keep us on track) - use an shirin, like [employer] [address]     Stay in follow up!         See me in August and Happy Wedding!!!

## 2023-06-26 ENCOUNTER — NURSE ONLY (OUTPATIENT)
Age: 28
End: 2023-06-26

## 2023-06-26 VITALS
OXYGEN SATURATION: 97 % | HEART RATE: 62 BPM | RESPIRATION RATE: 18 BRPM | TEMPERATURE: 98 F | DIASTOLIC BLOOD PRESSURE: 54 MMHG | SYSTOLIC BLOOD PRESSURE: 122 MMHG

## 2023-06-26 DIAGNOSIS — E53.8 VITAMIN B12 DEFICIENCY: Primary | ICD-10-CM

## 2023-06-26 RX ORDER — CYANOCOBALAMIN 1000 UG/ML
1000 INJECTION, SOLUTION INTRAMUSCULAR; SUBCUTANEOUS ONCE
Status: COMPLETED | OUTPATIENT
Start: 2023-06-26 | End: 2023-06-26

## 2023-06-26 RX ADMIN — CYANOCOBALAMIN 1000 MCG: 1000 INJECTION, SOLUTION INTRAMUSCULAR; SUBCUTANEOUS at 14:31

## 2023-06-27 ENCOUNTER — TELEPHONE (OUTPATIENT)
Age: 28
End: 2023-06-27

## 2023-07-24 ENCOUNTER — NURSE ONLY (OUTPATIENT)
Age: 28
End: 2023-07-24

## 2023-07-24 VITALS
DIASTOLIC BLOOD PRESSURE: 73 MMHG | TEMPERATURE: 98.5 F | WEIGHT: 202 LBS | SYSTOLIC BLOOD PRESSURE: 116 MMHG | RESPIRATION RATE: 18 BRPM | OXYGEN SATURATION: 98 % | HEART RATE: 60 BPM | HEIGHT: 64 IN | BODY MASS INDEX: 34.49 KG/M2

## 2023-07-24 DIAGNOSIS — E53.8 VITAMIN B12 DEFICIENCY: Primary | ICD-10-CM

## 2023-07-24 ASSESSMENT — PATIENT HEALTH QUESTIONNAIRE - PHQ9
SUM OF ALL RESPONSES TO PHQ9 QUESTIONS 1 & 2: 0
SUM OF ALL RESPONSES TO PHQ QUESTIONS 1-9: 0
2. FEELING DOWN, DEPRESSED OR HOPELESS: 0
1. LITTLE INTEREST OR PLEASURE IN DOING THINGS: 0
SUM OF ALL RESPONSES TO PHQ QUESTIONS 1-9: 0

## 2023-08-28 ENCOUNTER — OFFICE VISIT (OUTPATIENT)
Age: 28
End: 2023-08-28
Payer: COMMERCIAL

## 2023-08-28 VITALS
RESPIRATION RATE: 20 BRPM | TEMPERATURE: 98.4 F | DIASTOLIC BLOOD PRESSURE: 58 MMHG | BODY MASS INDEX: 34.66 KG/M2 | SYSTOLIC BLOOD PRESSURE: 103 MMHG | HEIGHT: 64 IN | OXYGEN SATURATION: 98 % | HEART RATE: 61 BPM | WEIGHT: 203 LBS

## 2023-08-28 DIAGNOSIS — E66.9 OBESITY (BMI 30.0-34.9): ICD-10-CM

## 2023-08-28 DIAGNOSIS — K91.2 POSTOPERATIVE INTESTINAL MALABSORPTION: Primary | ICD-10-CM

## 2023-08-28 DIAGNOSIS — E53.8 B12 DEFICIENCY: ICD-10-CM

## 2023-08-28 PROCEDURE — 99213 OFFICE O/P EST LOW 20 MIN: CPT | Performed by: NURSE PRACTITIONER

## 2023-08-28 RX ORDER — CYANOCOBALAMIN 1000 UG/ML
1000 INJECTION, SOLUTION INTRAMUSCULAR; SUBCUTANEOUS
Qty: 1 ML | Refills: 5 | Status: SHIPPED | OUTPATIENT
Start: 2023-08-28 | End: 2024-02-24

## 2023-08-28 RX ORDER — LORAZEPAM 0.5 MG/1
0.5 TABLET ORAL 2 TIMES DAILY PRN
COMMUNITY
Start: 2023-08-19

## 2023-08-28 ASSESSMENT — PATIENT HEALTH QUESTIONNAIRE - PHQ9
10. IF YOU CHECKED OFF ANY PROBLEMS, HOW DIFFICULT HAVE THESE PROBLEMS MADE IT FOR YOU TO DO YOUR WORK, TAKE CARE OF THINGS AT HOME, OR GET ALONG WITH OTHER PEOPLE: 1
SUM OF ALL RESPONSES TO PHQ QUESTIONS 1-9: 11
SUM OF ALL RESPONSES TO PHQ9 QUESTIONS 1 & 2: 3
6. FEELING BAD ABOUT YOURSELF - OR THAT YOU ARE A FAILURE OR HAVE LET YOURSELF OR YOUR FAMILY DOWN: 1
1. LITTLE INTEREST OR PLEASURE IN DOING THINGS: 1
8. MOVING OR SPEAKING SO SLOWLY THAT OTHER PEOPLE COULD HAVE NOTICED. OR THE OPPOSITE, BEING SO FIGETY OR RESTLESS THAT YOU HAVE BEEN MOVING AROUND A LOT MORE THAN USUAL: 1
SUM OF ALL RESPONSES TO PHQ QUESTIONS 1-9: 11
3. TROUBLE FALLING OR STAYING ASLEEP: 3
SUM OF ALL RESPONSES TO PHQ QUESTIONS 1-9: 11
5. POOR APPETITE OR OVEREATING: 1
4. FEELING TIRED OR HAVING LITTLE ENERGY: 2
SUM OF ALL RESPONSES TO PHQ QUESTIONS 1-9: 11
2. FEELING DOWN, DEPRESSED OR HOPELESS: 2
9. THOUGHTS THAT YOU WOULD BE BETTER OFF DEAD, OR OF HURTING YOURSELF: 0
7. TROUBLE CONCENTRATING ON THINGS, SUCH AS READING THE NEWSPAPER OR WATCHING TELEVISION: 0

## 2023-08-28 NOTE — PATIENT INSTRUCTIONS
OK to consider a pregnancy after 18 months IF your vitamin and nutrition levels are all good    Make an appointment with one of the bariatric dietitians, please call the bariatric line at [phone]. Appointments are offered in person and virtual at no charge.

## 2023-11-10 ENCOUNTER — TELEPHONE (OUTPATIENT)
Age: 28
End: 2023-11-10

## 2023-11-10 NOTE — TELEPHONE ENCOUNTER
Temi Kiran. NP reviewed the chart and did recommended to regress her diet and take the MOM for constipation. If the pain worsens she should go to the ER. Pt was called and verified using 2 patient identifiers and informed her of the above information.  Pt voiced understandings and Pt will also call the office back to schedule her follow up appointment with Andrew Felipe NP.

## 2023-11-10 NOTE — TELEPHONE ENCOUNTER
Patient called regarding abdominal pain x10 days,states pain level 6/10. Patient states she hasn't eaten or drank anything in 3 days.

## 2023-11-10 NOTE — TELEPHONE ENCOUNTER
Returned call to patient and verified using 2 patient identifiers. Pt is 1.5 year  s/p gastric bypass states that she is having issues with constant  cramping abdominal pain 6/10 pain  hat started when her cycle started and it ended 3-4 days ago but the pain has not gone away. When she is moving around the pain can be 9/10 pain. She states that she is having pain for the past 2 days when she eats and it causes her to become nauseated and then she throws up. She denies fevers or having constant nausea or vomiting but is not able to eat much. She is able to take in liquids but feels not enough. She reports that she has not had a BM for at least 2-3 days. Informed her that it maybe due to constipation and asked if she has taken anything for constipation. She states that she has not taken anything for the pain or constipation. She was concerned about the pain and not being able to keep food down. Informed her that if the pain is severe she needs to go to the ER. Will touch base with Sol to make aware of this call and follow back up with her any any other recommendations. Pt voiced understandings.

## 2024-10-10 NOTE — TELEPHONE ENCOUNTER
Mary Ann Ríos from [facility] called to let me know that the current auth # [id number] for the Gastric Bypass for pt with Dr Elizabeth Lama has been closed out due to one of their representatives inputting the request wrong and we need to resubmit. I have filled out the [facility] form again and faxed clinicals over via fax to [phone].
no
normal/no redness/no swelling/no discharge

## (undated) DEVICE — SYRINGE IRRIG 60ML SFT PLIABLE BLB EZ TO GRP 1 HND USE W/

## (undated) DEVICE — RELOAD STPL L45MM M THCK REINF FOR SIGNIA STPLR TRI-STPL

## (undated) DEVICE — REM POLYHESIVE ADULT PATIENT RETURN ELECTRODE: Brand: VALLEYLAB

## (undated) DEVICE — Device

## (undated) DEVICE — GLOVE ORANGE PI 7   MSG9070

## (undated) DEVICE — CLICKLINE SCISSORS INSERT: Brand: CLICKLINE

## (undated) DEVICE — TROCAR SITE CLOSURE DEVICE: Brand: ENDO CLOSE

## (undated) DEVICE — ENDO CARRY-ON PROCEDURE KIT INCLUDES ENZYMATIC SPONGE, GAUZE, BIOHAZARD LABEL, TRAY, LUBRICANT, DIRTY SCOPE LABEL, WATER LABEL, TRAY, DRAWSTRING PAD, AND DEFENDO 4-PIECE KIT.: Brand: ENDO CARRY-ON PROCEDURE KIT

## (undated) DEVICE — ARTICULATING RELOAD WITH TRI-STAPLE TECHNOLOGY: Brand: ENDO GIA

## (undated) DEVICE — 3000CC GUARDIAN II: Brand: GUARDIAN

## (undated) DEVICE — POWDER HEMOSTAT GEL 3.0GR -- SURGICEL

## (undated) DEVICE — ROCKER SWITCH PENCIL HOLSTER: Brand: VALLEYLAB

## (undated) DEVICE — STRIP,CLOSURE,WOUND,MEDI-STRIP,1/2X4: Brand: MEDLINE

## (undated) DEVICE — RESERVOIR,SUCTION,100CC,SILICONE: Brand: MEDLINE

## (undated) DEVICE — SYSTEM EVAC SMOKE LAPARSCOPIC

## (undated) DEVICE — STAPLER SKIN SQ 30 ABSRB STPL DISP INSORB

## (undated) DEVICE — VISUALIZATION SYSTEM: Brand: CLEARIFY

## (undated) DEVICE — COVER LT HNDL PLAS RIG 1 PER PK

## (undated) DEVICE — SUTURE MCRYL SZ 0 L36IN ABSRB UD L36MM CT-1 1/2 CIR Y946H

## (undated) DEVICE — DEVICE SUT 0 L48IN GRN POLY BRAID LD UNIT DISP SURGDAC

## (undated) DEVICE — DEVICE SUT VLT PRELD W/ POLYSRB 2-0 L48IN SUT DISP FOR LAP

## (undated) DEVICE — REINFORCED INTELLIGENT RELOAD, FOR USE WITH SIGNIA STAPLING SYSTEM: Brand: TRI-STAPLE 2.0

## (undated) DEVICE — AIR SHEET,LAT,COMFORT GLIDE, BLEND 40X80: Brand: MEDLINE

## (undated) DEVICE — TRAY PREP DRY W/ PREM GLV 2 APPL 6 SPNG 2 UNDPD 1 OVERWRAP

## (undated) DEVICE — GARMENT,MEDLINE,DVT,INT,CALF,MED, GEN2: Brand: MEDLINE

## (undated) DEVICE — STRAP,POSITIONING,KNEE/BODY,FOAM,4X60": Brand: MEDLINE

## (undated) DEVICE — SUTURE CHROMIC GUT SZ 2-0 L27IN ABSRB BRN L26MM SH 1/2 CIR G123H

## (undated) DEVICE — SOLIDIFIER FLUID 3000 CC ABSORB

## (undated) DEVICE — 4-PORT MANIFOLD: Brand: NEPTUNE 2

## (undated) DEVICE — SUTURING DEVICE: Brand: ENDO STITCH

## (undated) DEVICE — STERILE POLYISOPRENE POWDER-FREE SURGICAL GLOVES: Brand: PROTEXIS

## (undated) DEVICE — SUTURE PLN GUT SZ 2-0 L27IN ABSRB YELLOWISH TAN L70MM XLH 53T

## (undated) DEVICE — MASTISOL ADHESIVE LIQ 2/3ML

## (undated) DEVICE — LAPAROSCOPIC TROCAR SLEEVE/SINGLE USE: Brand: KII® OPTICAL ACCESS SYSTEM

## (undated) DEVICE — POWER SHELL: Brand: SIGNIA

## (undated) DEVICE — Z INACTIVE USE 2240337 DRAPE SURG PT TRANSFER TRAWAY SHT

## (undated) DEVICE — DRAIN SURG 19FR 100% SIL RADPQ RND CHN FULL FLUT

## (undated) DEVICE — PICO 7 10CM X 30CM: Brand: PICO™ 7

## (undated) DEVICE — DISSECTOR CRV JAW 48CM CRDLS -- SONICISION

## (undated) DEVICE — HYPODERMIC SAFETY NEEDLE: Brand: MAGELLAN

## (undated) DEVICE — (D)PREP SKN CHLRAPRP APPL 26ML -- CONVERT TO ITEM 371833

## (undated) DEVICE — TUBING, SUCTION, 1/4" X 12', STRAIGHT: Brand: MEDLINE

## (undated) DEVICE — TRAY,URINE METER,100% SILICONE,16FR10ML: Brand: MEDLINE

## (undated) DEVICE — SUTURE MCRYL SZ 4 0 L18IN ABSRB VLT PS 1 L24MM 3 8 CIR REV Y682H

## (undated) DEVICE — SUTURE MCRYL SZ 4-0 L27IN ABSRB UD L19MM PS-2 1/2 CIR PRIM Y426H

## (undated) DEVICE — SUT ETHLN 2-0 18IN FS BLK --

## (undated) DEVICE — GOWN,SIRUS,FABRNF,XL,20/CS: Brand: MEDLINE

## (undated) DEVICE — GLOVE SURG SZ 75 L1212IN FNGR THK138MIL BRN LTX FREE

## (undated) DEVICE — HANDLE LT SNAP ON ULT DURABLE LENS FOR TRUMPF ALC DISPOSABLE

## (undated) DEVICE — SOLUTION IV 1000ML 0.9% SOD CHL

## (undated) DEVICE — SUTURE PDS II SZ 0 L36IN ABSRB VLT L40MM CT 1/2 CIR Z358T

## (undated) DEVICE — TOWEL,OR,DSP,ST,BLUE,STD,2/PK,40PK/CS: Brand: MEDLINE

## (undated) DEVICE — GARMENT,MEDLINE,DVT,INT,CALF,LG, GEN2: Brand: MEDLINE

## (undated) DEVICE — GENERAL LAPAROSCOPY - SMH: Brand: MEDLINE INDUSTRIES, INC.

## (undated) DEVICE — TIP CLEANER: Brand: VALLEYLAB

## (undated) DEVICE — DERMABOND SKIN ADH 0.7ML -- DERMABOND ADVANCED 12/BX

## (undated) DEVICE — GOWN,AURORA,FABRIC-REINFORCED,X-LARGE: Brand: MEDLINE

## (undated) DEVICE — TROCAR: Brand: KII® OPTICAL ACCESS SYSTEM

## (undated) DEVICE — TROCAR: Brand: KII® SLEEVE

## (undated) DEVICE — VALVULOTOME 4MM VASC STRL DISP

## (undated) DEVICE — INTELLIGENT RELOAD: Brand: TRI-STAPLE 2.0

## (undated) DEVICE — C-SECTION II-LF: Brand: MEDLINE INDUSTRIES, INC.

## (undated) DEVICE — STAPLER INT 60 UNIV PUR W/ TRI-STAPLE TECHNOLOGY ULT FOR

## (undated) DEVICE — SUTURE SZ 0 27IN 5/8 CIR UR-6  TAPER PT VIOLET ABSRB VICRYL J603H